# Patient Record
Sex: MALE | Race: WHITE | NOT HISPANIC OR LATINO | Employment: FULL TIME
[De-identification: names, ages, dates, MRNs, and addresses within clinical notes are randomized per-mention and may not be internally consistent; named-entity substitution may affect disease eponyms.]

---

## 2017-04-13 ENCOUNTER — RECORDS - HEALTHEAST (OUTPATIENT)
Dept: ADMINISTRATIVE | Facility: OTHER | Age: 52
End: 2017-04-13

## 2017-05-08 ENCOUNTER — COMMUNICATION - HEALTHEAST (OUTPATIENT)
Dept: FAMILY MEDICINE | Facility: CLINIC | Age: 52
End: 2017-05-08

## 2017-05-08 ENCOUNTER — OFFICE VISIT - HEALTHEAST (OUTPATIENT)
Dept: FAMILY MEDICINE | Facility: CLINIC | Age: 52
End: 2017-05-08

## 2017-05-08 DIAGNOSIS — R05.9 COUGH: ICD-10-CM

## 2017-05-08 DIAGNOSIS — I10 ESSENTIAL HYPERTENSION: ICD-10-CM

## 2017-05-08 DIAGNOSIS — E78.5 HYPERLIPIDEMIA: ICD-10-CM

## 2017-05-08 DIAGNOSIS — K21.9 GERD (GASTROESOPHAGEAL REFLUX DISEASE): ICD-10-CM

## 2017-05-08 DIAGNOSIS — Z00.00 HEALTHCARE MAINTENANCE: ICD-10-CM

## 2017-06-05 ENCOUNTER — OFFICE VISIT - HEALTHEAST (OUTPATIENT)
Dept: FAMILY MEDICINE | Facility: CLINIC | Age: 52
End: 2017-06-05

## 2017-06-05 ENCOUNTER — COMMUNICATION - HEALTHEAST (OUTPATIENT)
Dept: FAMILY MEDICINE | Facility: CLINIC | Age: 52
End: 2017-06-05

## 2017-06-05 DIAGNOSIS — Z91.09 ENVIRONMENTAL ALLERGIES: ICD-10-CM

## 2017-06-05 DIAGNOSIS — G47.00 INSOMNIA: ICD-10-CM

## 2017-06-05 DIAGNOSIS — M16.12 OSTEOARTHRITIS OF LEFT HIP: ICD-10-CM

## 2017-06-05 DIAGNOSIS — J30.2 SEASONAL ALLERGIES: ICD-10-CM

## 2017-06-05 DIAGNOSIS — D72.10 EOSINOPHILIA: ICD-10-CM

## 2017-06-05 DIAGNOSIS — R73.09 ELEVATED GLUCOSE: ICD-10-CM

## 2017-06-05 LAB — HBA1C MFR BLD: 5.6 % (ref 3.5–6)

## 2017-06-05 ASSESSMENT — MIFFLIN-ST. JEOR: SCORE: 1671.33

## 2017-06-21 ENCOUNTER — COMMUNICATION - HEALTHEAST (OUTPATIENT)
Dept: FAMILY MEDICINE | Facility: CLINIC | Age: 52
End: 2017-06-21

## 2017-06-22 ENCOUNTER — COMMUNICATION - HEALTHEAST (OUTPATIENT)
Dept: FAMILY MEDICINE | Facility: CLINIC | Age: 52
End: 2017-06-22

## 2017-06-22 DIAGNOSIS — E78.5 HYPERLIPIDEMIA: ICD-10-CM

## 2017-06-26 ENCOUNTER — RECORDS - HEALTHEAST (OUTPATIENT)
Dept: ADMINISTRATIVE | Facility: OTHER | Age: 52
End: 2017-06-26

## 2017-06-27 ENCOUNTER — RECORDS - HEALTHEAST (OUTPATIENT)
Dept: ADMINISTRATIVE | Facility: OTHER | Age: 52
End: 2017-06-27

## 2017-07-11 ENCOUNTER — COMMUNICATION - HEALTHEAST (OUTPATIENT)
Dept: FAMILY MEDICINE | Facility: CLINIC | Age: 52
End: 2017-07-11

## 2017-07-12 ENCOUNTER — OFFICE VISIT - HEALTHEAST (OUTPATIENT)
Dept: FAMILY MEDICINE | Facility: CLINIC | Age: 52
End: 2017-07-12

## 2017-07-12 DIAGNOSIS — Z01.818 PRE-OP EXAM: ICD-10-CM

## 2017-07-12 LAB
ATRIAL RATE - MUSE: 58 BPM
DIASTOLIC BLOOD PRESSURE - MUSE: NORMAL MMHG
INTERPRETATION ECG - MUSE: NORMAL
P AXIS - MUSE: -7 DEGREES
PR INTERVAL - MUSE: 170 MS
QRS DURATION - MUSE: 84 MS
QT - MUSE: 410 MS
QTC - MUSE: 402 MS
R AXIS - MUSE: 16 DEGREES
SYSTOLIC BLOOD PRESSURE - MUSE: NORMAL MMHG
T AXIS - MUSE: 33 DEGREES
VENTRICULAR RATE- MUSE: 58 BPM

## 2017-07-12 ASSESSMENT — MIFFLIN-ST. JEOR: SCORE: 1677.85

## 2017-08-03 ENCOUNTER — COMMUNICATION - HEALTHEAST (OUTPATIENT)
Dept: FAMILY MEDICINE | Facility: CLINIC | Age: 52
End: 2017-08-03

## 2017-08-22 ENCOUNTER — COMMUNICATION - HEALTHEAST (OUTPATIENT)
Dept: FAMILY MEDICINE | Facility: CLINIC | Age: 52
End: 2017-08-22

## 2017-08-22 DIAGNOSIS — L30.9 ECZEMA: ICD-10-CM

## 2017-09-20 ENCOUNTER — RECORDS - HEALTHEAST (OUTPATIENT)
Dept: ADMINISTRATIVE | Facility: OTHER | Age: 52
End: 2017-09-20

## 2017-10-12 ENCOUNTER — COMMUNICATION - HEALTHEAST (OUTPATIENT)
Dept: FAMILY MEDICINE | Facility: CLINIC | Age: 52
End: 2017-10-12

## 2017-10-12 ENCOUNTER — OFFICE VISIT - HEALTHEAST (OUTPATIENT)
Dept: FAMILY MEDICINE | Facility: CLINIC | Age: 52
End: 2017-10-12

## 2017-10-12 DIAGNOSIS — F41.1 ANXIETY STATE: ICD-10-CM

## 2017-10-12 DIAGNOSIS — D17.9 MULTIPLE LIPOMAS: ICD-10-CM

## 2017-10-12 DIAGNOSIS — R73.03 PREDIABETES: ICD-10-CM

## 2017-10-12 DIAGNOSIS — K13.0 ANGULAR CHEILITIS: ICD-10-CM

## 2017-10-12 DIAGNOSIS — K63.5 COLON POLYP: ICD-10-CM

## 2017-10-12 DIAGNOSIS — I10 ESSENTIAL HYPERTENSION: ICD-10-CM

## 2017-10-12 DIAGNOSIS — G47.00 INSOMNIA: ICD-10-CM

## 2017-10-12 DIAGNOSIS — R59.1 LYMPHADENOPATHY: ICD-10-CM

## 2017-10-12 DIAGNOSIS — N63.0 BREAST MASS: ICD-10-CM

## 2017-10-12 DIAGNOSIS — Z23 NEED FOR IMMUNIZATION AGAINST INFLUENZA: ICD-10-CM

## 2017-10-12 DIAGNOSIS — Z12.9 CANCER SCREENING: ICD-10-CM

## 2017-10-12 LAB
HBA1C MFR BLD: 5.2 % (ref 3.5–6)
PSA SERPL-MCNC: 1.8 NG/ML (ref 0–3.5)

## 2017-10-13 ENCOUNTER — COMMUNICATION - HEALTHEAST (OUTPATIENT)
Dept: FAMILY MEDICINE | Facility: CLINIC | Age: 52
End: 2017-10-13

## 2017-10-13 LAB
BASOPHILS # BLD AUTO: 0 THOU/UL (ref 0–0.2)
BASOPHILS NFR BLD AUTO: 1 % (ref 0–2)
EOSINOPHIL # BLD AUTO: 0.1 THOU/UL (ref 0–0.4)
EOSINOPHIL NFR BLD AUTO: 2 % (ref 0–6)
ERYTHROCYTE [DISTWIDTH] IN BLOOD BY AUTOMATED COUNT: 13.3 % (ref 11–14.5)
HCT VFR BLD AUTO: 43.2 % (ref 40–54)
HGB BLD-MCNC: 14.3 G/DL (ref 14–18)
LAB AP CHARGES (HE HISTORICAL CONVERSION): NORMAL
LYMPHOCYTES # BLD AUTO: 1.4 THOU/UL (ref 0.8–4.4)
LYMPHOCYTES NFR BLD AUTO: 25 % (ref 20–40)
MCH RBC QN AUTO: 29.2 PG (ref 27–34)
MCHC RBC AUTO-ENTMCNC: 33.1 G/DL (ref 32–36)
MCV RBC AUTO: 88 FL (ref 80–100)
MONOCYTES # BLD AUTO: 0.6 THOU/UL (ref 0–0.9)
MONOCYTES NFR BLD AUTO: 11 % (ref 2–10)
NEUTROPHILS # BLD AUTO: 3.2 THOU/UL (ref 2–7.7)
NEUTROPHILS NFR BLD AUTO: 61 % (ref 50–70)
PATH REPORT.COMMENTS IMP SPEC: NORMAL
PATH REPORT.COMMENTS IMP SPEC: NORMAL
PATH REPORT.FINAL DX SPEC: NORMAL
PATH REPORT.MICROSCOPIC SPEC OTHER STN: ABNORMAL
PATH REPORT.MICROSCOPIC SPEC OTHER STN: NORMAL
PATH REPORT.RELEVANT HX SPEC: NORMAL
PLATELET # BLD AUTO: 234 THOU/UL (ref 140–440)
PMV BLD AUTO: 10.5 FL (ref 8.5–12.5)
RBC # BLD AUTO: 4.89 MILL/UL (ref 4.4–6.2)
WBC: 5.4 THOU/UL (ref 4–11)

## 2017-10-15 ENCOUNTER — COMMUNICATION - HEALTHEAST (OUTPATIENT)
Dept: FAMILY MEDICINE | Facility: CLINIC | Age: 52
End: 2017-10-15

## 2017-10-17 ENCOUNTER — HOSPITAL ENCOUNTER (OUTPATIENT)
Dept: MAMMOGRAPHY | Facility: CLINIC | Age: 52
Discharge: HOME | End: 2017-10-17
Attending: FAMILY MEDICINE

## 2017-10-17 DIAGNOSIS — N63.0 BREAST MASS: ICD-10-CM

## 2017-10-17 DIAGNOSIS — R59.1 LYMPHADENOPATHY: ICD-10-CM

## 2017-10-18 ENCOUNTER — HOSPITAL ENCOUNTER (OUTPATIENT)
Dept: PET IMAGING | Facility: HOSPITAL | Age: 52
Discharge: HOME | End: 2017-10-18
Attending: FAMILY MEDICINE

## 2017-10-18 DIAGNOSIS — N63.0 BREAST MASS: ICD-10-CM

## 2017-10-18 DIAGNOSIS — R59.1 LYMPHADENOPATHY: ICD-10-CM

## 2017-10-18 ASSESSMENT — MIFFLIN-ST. JEOR: SCORE: 1662.26

## 2017-10-19 ENCOUNTER — COMMUNICATION - HEALTHEAST (OUTPATIENT)
Dept: FAMILY MEDICINE | Facility: CLINIC | Age: 52
End: 2017-10-19

## 2017-11-06 ENCOUNTER — COMMUNICATION - HEALTHEAST (OUTPATIENT)
Dept: FAMILY MEDICINE | Facility: CLINIC | Age: 52
End: 2017-11-06

## 2017-11-06 DIAGNOSIS — K21.9 GERD (GASTROESOPHAGEAL REFLUX DISEASE): ICD-10-CM

## 2017-12-26 ENCOUNTER — COMMUNICATION - HEALTHEAST (OUTPATIENT)
Dept: FAMILY MEDICINE | Facility: CLINIC | Age: 52
End: 2017-12-26

## 2018-01-01 ENCOUNTER — RECORDS - HEALTHEAST (OUTPATIENT)
Dept: ADMINISTRATIVE | Facility: OTHER | Age: 53
End: 2018-01-01

## 2018-01-03 ENCOUNTER — COMMUNICATION - HEALTHEAST (OUTPATIENT)
Dept: FAMILY MEDICINE | Facility: CLINIC | Age: 53
End: 2018-01-03

## 2018-01-03 DIAGNOSIS — R05.9 COUGH: ICD-10-CM

## 2018-03-22 ENCOUNTER — COMMUNICATION - HEALTHEAST (OUTPATIENT)
Dept: FAMILY MEDICINE | Facility: CLINIC | Age: 53
End: 2018-03-22

## 2018-03-22 DIAGNOSIS — I10 ESSENTIAL HYPERTENSION: ICD-10-CM

## 2018-03-23 ENCOUNTER — RECORDS - HEALTHEAST (OUTPATIENT)
Dept: ADMINISTRATIVE | Facility: OTHER | Age: 53
End: 2018-03-23

## 2018-04-09 ENCOUNTER — COMMUNICATION - HEALTHEAST (OUTPATIENT)
Dept: FAMILY MEDICINE | Facility: CLINIC | Age: 53
End: 2018-04-09

## 2018-04-09 DIAGNOSIS — E78.5 HYPERLIPIDEMIA: ICD-10-CM

## 2018-04-09 DIAGNOSIS — Z91.09 ENVIRONMENTAL ALLERGIES: ICD-10-CM

## 2018-04-10 ENCOUNTER — COMMUNICATION - HEALTHEAST (OUTPATIENT)
Dept: FAMILY MEDICINE | Facility: CLINIC | Age: 53
End: 2018-04-10

## 2018-04-10 DIAGNOSIS — Z91.09 ENVIRONMENTAL ALLERGIES: ICD-10-CM

## 2018-05-31 ENCOUNTER — COMMUNICATION - HEALTHEAST (OUTPATIENT)
Dept: FAMILY MEDICINE | Facility: CLINIC | Age: 53
End: 2018-05-31

## 2018-05-31 ENCOUNTER — OFFICE VISIT - HEALTHEAST (OUTPATIENT)
Dept: FAMILY MEDICINE | Facility: CLINIC | Age: 53
End: 2018-05-31

## 2018-05-31 DIAGNOSIS — R42 DIZZINESS: ICD-10-CM

## 2018-05-31 DIAGNOSIS — R09.81 SINUS CONGESTION: ICD-10-CM

## 2018-05-31 DIAGNOSIS — H57.9 EYE PRESSURE: ICD-10-CM

## 2018-05-31 DIAGNOSIS — G47.9 DIFFICULTY SLEEPING: ICD-10-CM

## 2018-05-31 DIAGNOSIS — E78.5 HYPERLIPIDEMIA: ICD-10-CM

## 2018-05-31 ASSESSMENT — MIFFLIN-ST. JEOR: SCORE: 1670.88

## 2018-06-25 ENCOUNTER — OFFICE VISIT - HEALTHEAST (OUTPATIENT)
Dept: OTOLARYNGOLOGY | Facility: CLINIC | Age: 53
End: 2018-06-25

## 2018-06-25 DIAGNOSIS — J34.2 NASAL SEPTAL DEFORMITY: ICD-10-CM

## 2018-06-26 ENCOUNTER — COMMUNICATION - HEALTHEAST (OUTPATIENT)
Dept: FAMILY MEDICINE | Facility: CLINIC | Age: 53
End: 2018-06-26

## 2018-06-26 DIAGNOSIS — I10 ESSENTIAL HYPERTENSION: ICD-10-CM

## 2018-06-28 ENCOUNTER — COMMUNICATION - HEALTHEAST (OUTPATIENT)
Dept: FAMILY MEDICINE | Facility: CLINIC | Age: 53
End: 2018-06-28

## 2018-06-28 DIAGNOSIS — M89.9 DISORDER OF BONE AND CARTILAGE: ICD-10-CM

## 2018-06-28 DIAGNOSIS — M94.9 DISORDER OF BONE AND CARTILAGE: ICD-10-CM

## 2018-07-18 ENCOUNTER — RECORDS - HEALTHEAST (OUTPATIENT)
Dept: ADMINISTRATIVE | Facility: OTHER | Age: 53
End: 2018-07-18

## 2018-07-18 ENCOUNTER — RECORDS - HEALTHEAST (OUTPATIENT)
Dept: BONE DENSITY | Facility: CLINIC | Age: 53
End: 2018-07-18

## 2018-07-18 DIAGNOSIS — M94.9 DISORDER OF CARTILAGE, UNSPECIFIED: ICD-10-CM

## 2018-07-18 DIAGNOSIS — M89.9 DISORDER OF BONE, UNSPECIFIED: ICD-10-CM

## 2018-07-20 ENCOUNTER — COMMUNICATION - HEALTHEAST (OUTPATIENT)
Dept: FAMILY MEDICINE | Facility: CLINIC | Age: 53
End: 2018-07-20

## 2018-09-17 ENCOUNTER — RECORDS - HEALTHEAST (OUTPATIENT)
Dept: ADMINISTRATIVE | Facility: OTHER | Age: 53
End: 2018-09-17

## 2018-12-14 ENCOUNTER — COMMUNICATION - HEALTHEAST (OUTPATIENT)
Dept: FAMILY MEDICINE | Facility: CLINIC | Age: 53
End: 2018-12-14

## 2019-02-05 ENCOUNTER — RECORDS - HEALTHEAST (OUTPATIENT)
Dept: ADMINISTRATIVE | Facility: OTHER | Age: 54
End: 2019-02-05

## 2019-02-08 ENCOUNTER — COMMUNICATION - HEALTHEAST (OUTPATIENT)
Dept: FAMILY MEDICINE | Facility: CLINIC | Age: 54
End: 2019-02-08

## 2019-02-16 ENCOUNTER — COMMUNICATION - HEALTHEAST (OUTPATIENT)
Dept: FAMILY MEDICINE | Facility: CLINIC | Age: 54
End: 2019-02-16

## 2019-03-01 ENCOUNTER — RECORDS - HEALTHEAST (OUTPATIENT)
Dept: ADMINISTRATIVE | Facility: OTHER | Age: 54
End: 2019-03-01

## 2019-03-19 ENCOUNTER — RECORDS - HEALTHEAST (OUTPATIENT)
Dept: ADMINISTRATIVE | Facility: OTHER | Age: 54
End: 2019-03-19

## 2019-03-24 ENCOUNTER — COMMUNICATION - HEALTHEAST (OUTPATIENT)
Dept: FAMILY MEDICINE | Facility: CLINIC | Age: 54
End: 2019-03-24

## 2019-03-24 DIAGNOSIS — K21.9 GERD (GASTROESOPHAGEAL REFLUX DISEASE): ICD-10-CM

## 2019-04-16 ENCOUNTER — OFFICE VISIT - HEALTHEAST (OUTPATIENT)
Dept: FAMILY MEDICINE | Facility: CLINIC | Age: 54
End: 2019-04-16

## 2019-04-16 DIAGNOSIS — M89.9 DISORDER OF BONE AND CARTILAGE: ICD-10-CM

## 2019-04-16 DIAGNOSIS — G25.81 RESTLESS LEGS SYNDROME (RLS): ICD-10-CM

## 2019-04-16 DIAGNOSIS — M71.30 SYNOVIAL CYST: ICD-10-CM

## 2019-04-16 DIAGNOSIS — G47.00 INSOMNIA, UNSPECIFIED TYPE: ICD-10-CM

## 2019-04-16 DIAGNOSIS — I10 ESSENTIAL HYPERTENSION: ICD-10-CM

## 2019-04-16 DIAGNOSIS — L20.89 PAPULAR ATOPIC DERMATITIS: ICD-10-CM

## 2019-04-16 DIAGNOSIS — E78.5 HYPERLIPIDEMIA: ICD-10-CM

## 2019-04-16 DIAGNOSIS — L71.9 ROSACEA: ICD-10-CM

## 2019-04-16 DIAGNOSIS — M94.9 DISORDER OF BONE AND CARTILAGE: ICD-10-CM

## 2019-04-16 DIAGNOSIS — R51.9 PRESSURE IN HEAD: ICD-10-CM

## 2019-04-16 DIAGNOSIS — R06.83 SNORING: ICD-10-CM

## 2019-04-17 ENCOUNTER — OFFICE VISIT - HEALTHEAST (OUTPATIENT)
Dept: SLEEP MEDICINE | Facility: CLINIC | Age: 54
End: 2019-04-17

## 2019-04-17 DIAGNOSIS — G47.26 SHIFT WORK SLEEP DISORDER: ICD-10-CM

## 2019-04-17 DIAGNOSIS — R53.83 FATIGUE, UNSPECIFIED TYPE: ICD-10-CM

## 2019-04-17 DIAGNOSIS — G25.81 RESTLESS LEGS SYNDROME (RLS): ICD-10-CM

## 2019-04-17 DIAGNOSIS — R06.83 SNORING: ICD-10-CM

## 2019-04-17 DIAGNOSIS — G47.00 INSOMNIA, UNSPECIFIED TYPE: ICD-10-CM

## 2019-04-17 ASSESSMENT — MIFFLIN-ST. JEOR: SCORE: 1694.01

## 2019-04-19 ENCOUNTER — COMMUNICATION - HEALTHEAST (OUTPATIENT)
Dept: FAMILY MEDICINE | Facility: CLINIC | Age: 54
End: 2019-04-19

## 2019-05-08 ENCOUNTER — RECORDS - HEALTHEAST (OUTPATIENT)
Dept: ADMINISTRATIVE | Facility: OTHER | Age: 54
End: 2019-05-08

## 2019-05-08 ENCOUNTER — RECORDS - HEALTHEAST (OUTPATIENT)
Dept: SLEEP MEDICINE | Facility: CLINIC | Age: 54
End: 2019-05-08

## 2019-05-08 DIAGNOSIS — R06.83 SNORING: ICD-10-CM

## 2019-05-08 DIAGNOSIS — R53.83 OTHER FATIGUE: ICD-10-CM

## 2019-05-08 DIAGNOSIS — G25.81 RESTLESS LEGS SYNDROME: ICD-10-CM

## 2019-05-30 ENCOUNTER — OFFICE VISIT - HEALTHEAST (OUTPATIENT)
Dept: SLEEP MEDICINE | Facility: CLINIC | Age: 54
End: 2019-05-30

## 2019-05-30 DIAGNOSIS — R06.83 SNORING: ICD-10-CM

## 2019-05-30 DIAGNOSIS — G25.81 RESTLESS LEGS SYNDROME (RLS): ICD-10-CM

## 2019-05-30 ASSESSMENT — MIFFLIN-ST. JEOR: SCORE: 1677.68

## 2019-09-07 ENCOUNTER — COMMUNICATION - HEALTHEAST (OUTPATIENT)
Dept: FAMILY MEDICINE | Facility: CLINIC | Age: 54
End: 2019-09-07

## 2019-09-07 DIAGNOSIS — M94.9 DISORDER OF BONE AND CARTILAGE: ICD-10-CM

## 2019-09-07 DIAGNOSIS — M89.9 DISORDER OF BONE AND CARTILAGE: ICD-10-CM

## 2019-09-10 ENCOUNTER — COMMUNICATION - HEALTHEAST (OUTPATIENT)
Dept: FAMILY MEDICINE | Facility: CLINIC | Age: 54
End: 2019-09-10

## 2019-09-10 DIAGNOSIS — M94.9 DISORDER OF BONE AND CARTILAGE: ICD-10-CM

## 2019-09-10 DIAGNOSIS — J30.2 SEASONAL ALLERGIES: ICD-10-CM

## 2019-09-10 DIAGNOSIS — L71.9 ROSACEA: ICD-10-CM

## 2019-09-10 DIAGNOSIS — M89.9 DISORDER OF BONE AND CARTILAGE: ICD-10-CM

## 2019-10-02 ENCOUNTER — RECORDS - HEALTHEAST (OUTPATIENT)
Dept: ADMINISTRATIVE | Facility: OTHER | Age: 54
End: 2019-10-02

## 2019-10-02 ENCOUNTER — RECORDS - HEALTHEAST (OUTPATIENT)
Dept: BONE DENSITY | Facility: CLINIC | Age: 54
End: 2019-10-02

## 2019-10-02 DIAGNOSIS — M89.9 DISORDER OF BONE, UNSPECIFIED: ICD-10-CM

## 2019-10-02 DIAGNOSIS — M94.9 DISORDER OF CARTILAGE, UNSPECIFIED: ICD-10-CM

## 2019-10-30 ENCOUNTER — AMBULATORY - HEALTHEAST (OUTPATIENT)
Dept: NURSING | Facility: CLINIC | Age: 54
End: 2019-10-30

## 2019-12-19 ENCOUNTER — COMMUNICATION - HEALTHEAST (OUTPATIENT)
Dept: FAMILY MEDICINE | Facility: CLINIC | Age: 54
End: 2019-12-19

## 2019-12-19 DIAGNOSIS — M94.9 DISORDER OF BONE AND CARTILAGE: ICD-10-CM

## 2019-12-19 DIAGNOSIS — M89.9 DISORDER OF BONE AND CARTILAGE: ICD-10-CM

## 2019-12-19 DIAGNOSIS — K21.9 GERD (GASTROESOPHAGEAL REFLUX DISEASE): ICD-10-CM

## 2019-12-19 DIAGNOSIS — I10 ESSENTIAL HYPERTENSION: ICD-10-CM

## 2020-04-04 ENCOUNTER — COMMUNICATION - HEALTHEAST (OUTPATIENT)
Dept: FAMILY MEDICINE | Facility: CLINIC | Age: 55
End: 2020-04-04

## 2020-04-04 DIAGNOSIS — M94.9 DISORDER OF BONE AND CARTILAGE: ICD-10-CM

## 2020-04-04 DIAGNOSIS — M89.9 DISORDER OF BONE AND CARTILAGE: ICD-10-CM

## 2020-05-14 ENCOUNTER — COMMUNICATION - HEALTHEAST (OUTPATIENT)
Dept: FAMILY MEDICINE | Facility: CLINIC | Age: 55
End: 2020-05-14

## 2020-05-14 DIAGNOSIS — E78.5 HYPERLIPIDEMIA: ICD-10-CM

## 2020-06-10 ENCOUNTER — COMMUNICATION - HEALTHEAST (OUTPATIENT)
Dept: FAMILY MEDICINE | Facility: CLINIC | Age: 55
End: 2020-06-10

## 2020-06-15 ENCOUNTER — OFFICE VISIT - HEALTHEAST (OUTPATIENT)
Dept: FAMILY MEDICINE | Facility: CLINIC | Age: 55
End: 2020-06-15

## 2020-06-15 DIAGNOSIS — E78.5 HYPERLIPIDEMIA: ICD-10-CM

## 2020-06-15 DIAGNOSIS — L71.9 ROSACEA: ICD-10-CM

## 2020-06-15 DIAGNOSIS — M94.9 DISORDER OF BONE AND CARTILAGE: ICD-10-CM

## 2020-06-15 DIAGNOSIS — M89.9 DISORDER OF BONE AND CARTILAGE: ICD-10-CM

## 2020-06-15 DIAGNOSIS — G25.81 RESTLESS LEGS SYNDROME (RLS): ICD-10-CM

## 2020-06-15 DIAGNOSIS — G47.00 INSOMNIA, UNSPECIFIED TYPE: ICD-10-CM

## 2020-06-15 DIAGNOSIS — I10 ESSENTIAL HYPERTENSION: ICD-10-CM

## 2020-06-15 DIAGNOSIS — M16.9 OSTEOARTHRITIS OF HIP, UNSPECIFIED LATERALITY, UNSPECIFIED OSTEOARTHRITIS TYPE: ICD-10-CM

## 2020-06-15 DIAGNOSIS — R51.9 PRESSURE IN HEAD: ICD-10-CM

## 2020-06-15 DIAGNOSIS — R06.83 SNORING: ICD-10-CM

## 2020-06-15 RX ORDER — ATENOLOL 25 MG/1
25 TABLET ORAL DAILY
Qty: 90 TABLET | Refills: 4 | Status: SHIPPED | OUTPATIENT
Start: 2020-06-15 | End: 2021-09-03

## 2020-06-15 RX ORDER — ALENDRONATE SODIUM 70 MG/1
TABLET ORAL
Qty: 12 TABLET | Refills: 4 | Status: SHIPPED | OUTPATIENT
Start: 2020-06-15 | End: 2021-08-05

## 2020-06-26 ENCOUNTER — RECORDS - HEALTHEAST (OUTPATIENT)
Dept: ADMINISTRATIVE | Facility: OTHER | Age: 55
End: 2020-06-26

## 2020-08-13 ENCOUNTER — COMMUNICATION - HEALTHEAST (OUTPATIENT)
Dept: FAMILY MEDICINE | Facility: CLINIC | Age: 55
End: 2020-08-13

## 2020-08-13 DIAGNOSIS — G47.00 INSOMNIA, UNSPECIFIED TYPE: ICD-10-CM

## 2020-10-21 ENCOUNTER — COMMUNICATION - HEALTHEAST (OUTPATIENT)
Dept: FAMILY MEDICINE | Facility: CLINIC | Age: 55
End: 2020-10-21

## 2020-10-21 DIAGNOSIS — N50.89 MASS OF LEFT TESTICLE: ICD-10-CM

## 2020-10-27 ENCOUNTER — HOSPITAL ENCOUNTER (OUTPATIENT)
Dept: ULTRASOUND IMAGING | Facility: HOSPITAL | Age: 55
Discharge: HOME | End: 2020-10-27
Attending: FAMILY MEDICINE

## 2020-10-27 DIAGNOSIS — N50.89 MASS OF LEFT TESTICLE: ICD-10-CM

## 2020-10-28 ENCOUNTER — COMMUNICATION - HEALTHEAST (OUTPATIENT)
Dept: FAMILY MEDICINE | Facility: CLINIC | Age: 55
End: 2020-10-28

## 2020-10-28 DIAGNOSIS — K40.90 LEFT INGUINAL HERNIA: ICD-10-CM

## 2020-11-24 ENCOUNTER — COMMUNICATION - HEALTHEAST (OUTPATIENT)
Dept: FAMILY MEDICINE | Facility: CLINIC | Age: 55
End: 2020-11-24

## 2020-11-24 ENCOUNTER — OFFICE VISIT - HEALTHEAST (OUTPATIENT)
Dept: FAMILY MEDICINE | Facility: CLINIC | Age: 55
End: 2020-11-24

## 2020-11-24 DIAGNOSIS — J01.90 ACUTE SINUSITIS WITH SYMPTOMS GREATER THAN 10 DAYS: ICD-10-CM

## 2020-11-27 ENCOUNTER — COMMUNICATION - HEALTHEAST (OUTPATIENT)
Dept: FAMILY MEDICINE | Facility: CLINIC | Age: 55
End: 2020-11-27

## 2020-11-27 DIAGNOSIS — K21.9 GERD (GASTROESOPHAGEAL REFLUX DISEASE): ICD-10-CM

## 2020-11-27 DIAGNOSIS — G47.00 INSOMNIA, UNSPECIFIED TYPE: ICD-10-CM

## 2021-01-15 ENCOUNTER — COMMUNICATION - HEALTHEAST (OUTPATIENT)
Dept: FAMILY MEDICINE | Facility: CLINIC | Age: 56
End: 2021-01-15

## 2021-02-27 ENCOUNTER — COMMUNICATION - HEALTHEAST (OUTPATIENT)
Dept: FAMILY MEDICINE | Facility: CLINIC | Age: 56
End: 2021-02-27

## 2021-02-27 DIAGNOSIS — G47.00 INSOMNIA, UNSPECIFIED TYPE: ICD-10-CM

## 2021-03-08 ENCOUNTER — RECORDS - HEALTHEAST (OUTPATIENT)
Dept: ADMINISTRATIVE | Facility: OTHER | Age: 56
End: 2021-03-08

## 2021-03-08 LAB
ALT SERPL W/O P-5'-P-CCNC: 18 U/L (ref 9–46)
AST SERPL-CCNC: 16 U/L (ref 10–35)
CHOLEST SERPL-MCNC: 141 MG/DL
CREAT SERPL-MCNC: 0.94 MG/DL (ref 0.7–1.33)
GFR ESTIMATE EXT - HISTORICAL: 91 ML/MIN/1.73M2
GFR ESTIMATE, IF BLACK EXT - HISTORICAL: 105 ML/MIN/1.73M2
HDLC SERPL-MCNC: 52 MG/DL
LDLC SERPL CALC-MCNC: 73 MG/DL
NON HDL CHOL. (LDL+VLDL): 89 MG/DL
TRIGLYCERIDES (HISTORICAL CONVERSION): 84 MG/DL

## 2021-03-09 ENCOUNTER — RECORDS - HEALTHEAST (OUTPATIENT)
Dept: ADMINISTRATIVE | Facility: OTHER | Age: 56
End: 2021-03-09

## 2021-03-17 ENCOUNTER — COMMUNICATION - HEALTHEAST (OUTPATIENT)
Dept: FAMILY MEDICINE | Facility: CLINIC | Age: 56
End: 2021-03-17

## 2021-03-18 ENCOUNTER — COMMUNICATION - HEALTHEAST (OUTPATIENT)
Dept: FAMILY MEDICINE | Facility: CLINIC | Age: 56
End: 2021-03-18

## 2021-03-19 ENCOUNTER — OFFICE VISIT - HEALTHEAST (OUTPATIENT)
Dept: FAMILY MEDICINE | Facility: CLINIC | Age: 56
End: 2021-03-19

## 2021-03-19 DIAGNOSIS — I10 ESSENTIAL HYPERTENSION: ICD-10-CM

## 2021-03-19 DIAGNOSIS — Z09 HOSPITAL DISCHARGE FOLLOW-UP: ICD-10-CM

## 2021-03-19 DIAGNOSIS — R07.89 ATYPICAL CHEST PAIN: ICD-10-CM

## 2021-03-19 DIAGNOSIS — G47.00 INSOMNIA, UNSPECIFIED TYPE: ICD-10-CM

## 2021-03-19 DIAGNOSIS — F41.1 ANXIETY STATE: ICD-10-CM

## 2021-03-19 RX ORDER — PROPRANOLOL HYDROCHLORIDE 10 MG/1
TABLET ORAL
Qty: 30 TABLET | Refills: 1 | Status: SHIPPED | OUTPATIENT
Start: 2021-03-19 | End: 2021-07-09

## 2021-03-19 ASSESSMENT — MIFFLIN-ST. JEOR: SCORE: 1631.76

## 2021-03-22 ENCOUNTER — HOSPITAL ENCOUNTER (OUTPATIENT)
Dept: NUCLEAR MEDICINE | Facility: HOSPITAL | Age: 56
Discharge: HOME | End: 2021-03-22
Attending: FAMILY MEDICINE

## 2021-03-22 ENCOUNTER — COMMUNICATION - HEALTHEAST (OUTPATIENT)
Dept: FAMILY MEDICINE | Facility: CLINIC | Age: 56
End: 2021-03-22

## 2021-03-22 ENCOUNTER — HOSPITAL ENCOUNTER (OUTPATIENT)
Dept: CARDIOLOGY | Facility: HOSPITAL | Age: 56
Discharge: HOME | End: 2021-03-22
Attending: FAMILY MEDICINE

## 2021-03-22 DIAGNOSIS — R07.89 ATYPICAL CHEST PAIN: ICD-10-CM

## 2021-03-22 DIAGNOSIS — F41.0 PANIC ATTACK: ICD-10-CM

## 2021-03-22 DIAGNOSIS — F41.1 ANXIETY STATE: ICD-10-CM

## 2021-03-22 LAB
CV STRESS CURRENT BP HE: NORMAL
CV STRESS CURRENT HR HE: 102
CV STRESS CURRENT HR HE: 112
CV STRESS CURRENT HR HE: 117
CV STRESS CURRENT HR HE: 120
CV STRESS CURRENT HR HE: 122
CV STRESS CURRENT HR HE: 126
CV STRESS CURRENT HR HE: 131
CV STRESS CURRENT HR HE: 131
CV STRESS CURRENT HR HE: 137
CV STRESS CURRENT HR HE: 138
CV STRESS CURRENT HR HE: 141
CV STRESS CURRENT HR HE: 153
CV STRESS CURRENT HR HE: 153
CV STRESS CURRENT HR HE: 162
CV STRESS CURRENT HR HE: 165
CV STRESS CURRENT HR HE: 166
CV STRESS CURRENT HR HE: 167
CV STRESS CURRENT HR HE: 167
CV STRESS CURRENT HR HE: 81
CV STRESS CURRENT HR HE: 86
CV STRESS CURRENT HR HE: 87
CV STRESS CURRENT HR HE: 91
CV STRESS CURRENT HR HE: 92
CV STRESS CURRENT HR HE: 92
CV STRESS CURRENT HR HE: 94
CV STRESS CURRENT HR HE: 99
CV STRESS DEVIATION TIME HE: NORMAL
CV STRESS ECHO PERCENT HR HE: NORMAL
CV STRESS EXERCISE STAGE HE: NORMAL
CV STRESS EXERCISE STAGE REACHED HE: NORMAL
CV STRESS FINAL RESTING BP HE: NORMAL
CV STRESS FINAL RESTING HR HE: 86
CV STRESS MAX HR HE: 168
CV STRESS MAX TREADMILL GRADE HE: 18
CV STRESS MAX TREADMILL SPEED HE: 5
CV STRESS PEAK DIA BP HE: NORMAL
CV STRESS PEAK SYS BP HE: NORMAL
CV STRESS PHASE HE: NORMAL
CV STRESS PROTOCOL HE: NORMAL
CV STRESS RESTING PT POSITION HE: NORMAL
CV STRESS RESTING PT POSITION HE: NORMAL
CV STRESS ST DEVIATION AMOUNT HE: NORMAL
CV STRESS ST DEVIATION ELEVATION HE: NORMAL
CV STRESS ST EVELATION AMOUNT HE: NORMAL
CV STRESS TEST TYPE HE: NORMAL
CV STRESS TOTAL STAGE TIME MIN 1 HE: NORMAL
NUC STRESS EJECTION FRACTION: 65 %
RATE PRESSURE PRODUCT: NORMAL
STRESS ANGINA INDEX: 0
STRESS ECHO BASELINE DIASTOLIC HE: 82
STRESS ECHO BASELINE HR: 85
STRESS ECHO BASELINE SYSTOLIC BP: 122
STRESS ECHO CALCULATED PERCENT HR: 102 %
STRESS ECHO LAST STRESS DIASTOLIC BP: 78
STRESS ECHO LAST STRESS HR: 166
STRESS ECHO LAST STRESS SYSTOLIC BP: 186
STRESS ECHO POST ESTIMATED WORKLOAD: 12.3
STRESS ECHO POST EXERCISE DUR MIN: 12
STRESS ECHO POST EXERCISE DUR SEC: 0
STRESS ECHO TARGET HR: 165

## 2021-03-25 ASSESSMENT — ANXIETY QUESTIONNAIRES
1. FEELING NERVOUS, ANXIOUS, OR ON EDGE: NEARLY EVERY DAY
5. BEING SO RESTLESS THAT IT IS HARD TO SIT STILL: MORE THAN HALF THE DAYS
7. FEELING AFRAID AS IF SOMETHING AWFUL MIGHT HAPPEN: SEVERAL DAYS
6. BECOMING EASILY ANNOYED OR IRRITABLE: NOT AT ALL
2. NOT BEING ABLE TO STOP OR CONTROL WORRYING: NEARLY EVERY DAY
GAD7 TOTAL SCORE: 15
3. WORRYING TOO MUCH ABOUT DIFFERENT THINGS: NEARLY EVERY DAY
4. TROUBLE RELAXING: NEARLY EVERY DAY

## 2021-03-31 ENCOUNTER — COMMUNICATION - HEALTHEAST (OUTPATIENT)
Dept: FAMILY MEDICINE | Facility: CLINIC | Age: 56
End: 2021-03-31

## 2021-03-31 DIAGNOSIS — G47.00 INSOMNIA, UNSPECIFIED TYPE: ICD-10-CM

## 2021-04-06 ENCOUNTER — RECORDS - HEALTHEAST (OUTPATIENT)
Dept: HEALTH INFORMATION MANAGEMENT | Facility: CLINIC | Age: 56
End: 2021-04-06

## 2021-04-09 ENCOUNTER — RECORDS - HEALTHEAST (OUTPATIENT)
Dept: ADMINISTRATIVE | Facility: OTHER | Age: 56
End: 2021-04-09

## 2021-04-30 ENCOUNTER — OFFICE VISIT - HEALTHEAST (OUTPATIENT)
Dept: FAMILY MEDICINE | Facility: CLINIC | Age: 56
End: 2021-04-30

## 2021-04-30 ENCOUNTER — RECORDS - HEALTHEAST (OUTPATIENT)
Dept: FAMILY MEDICINE | Facility: CLINIC | Age: 56
End: 2021-04-30

## 2021-04-30 DIAGNOSIS — F41.0 PANIC ATTACK: ICD-10-CM

## 2021-04-30 DIAGNOSIS — F41.1 ANXIETY STATE: ICD-10-CM

## 2021-04-30 DIAGNOSIS — L30.9 ECZEMA: ICD-10-CM

## 2021-04-30 DIAGNOSIS — K21.9 GERD (GASTROESOPHAGEAL REFLUX DISEASE): ICD-10-CM

## 2021-04-30 DIAGNOSIS — Z13.220 LIPID SCREENING: ICD-10-CM

## 2021-04-30 DIAGNOSIS — M94.9 DISORDER OF BONE AND CARTILAGE: ICD-10-CM

## 2021-04-30 DIAGNOSIS — I10 ESSENTIAL HYPERTENSION: ICD-10-CM

## 2021-04-30 DIAGNOSIS — M89.9 DISORDER OF BONE AND CARTILAGE: ICD-10-CM

## 2021-04-30 DIAGNOSIS — Z91.09 ENVIRONMENTAL ALLERGIES: ICD-10-CM

## 2021-04-30 RX ORDER — DESONIDE 0.5 MG/G
CREAM TOPICAL
Qty: 60 G | Refills: 3 | Status: SHIPPED | OUTPATIENT
Start: 2021-04-30 | End: 2022-01-02

## 2021-04-30 RX ORDER — ALPRAZOLAM 0.25 MG
0.25 TABLET ORAL DAILY PRN
Qty: 30 TABLET | Refills: 1 | Status: SHIPPED | OUTPATIENT
Start: 2021-04-30 | End: 2022-01-02

## 2021-04-30 ASSESSMENT — PATIENT HEALTH QUESTIONNAIRE - PHQ9: SUM OF ALL RESPONSES TO PHQ QUESTIONS 1-9: 4

## 2021-04-30 ASSESSMENT — ANXIETY QUESTIONNAIRES
GAD7 TOTAL SCORE: 10
5. BEING SO RESTLESS THAT IT IS HARD TO SIT STILL: SEVERAL DAYS
IF YOU CHECKED OFF ANY PROBLEMS ON THIS QUESTIONNAIRE, HOW DIFFICULT HAVE THESE PROBLEMS MADE IT FOR YOU TO DO YOUR WORK, TAKE CARE OF THINGS AT HOME, OR GET ALONG WITH OTHER PEOPLE: SOMEWHAT DIFFICULT
1. FEELING NERVOUS, ANXIOUS, OR ON EDGE: NEARLY EVERY DAY
3. WORRYING TOO MUCH ABOUT DIFFERENT THINGS: MORE THAN HALF THE DAYS
2. NOT BEING ABLE TO STOP OR CONTROL WORRYING: MORE THAN HALF THE DAYS
4. TROUBLE RELAXING: SEVERAL DAYS
7. FEELING AFRAID AS IF SOMETHING AWFUL MIGHT HAPPEN: SEVERAL DAYS
6. BECOMING EASILY ANNOYED OR IRRITABLE: NOT AT ALL

## 2021-05-12 ENCOUNTER — RECORDS - HEALTHEAST (OUTPATIENT)
Dept: ADMINISTRATIVE | Facility: OTHER | Age: 56
End: 2021-05-12

## 2021-05-12 DIAGNOSIS — F41.1 ANXIETY STATE: ICD-10-CM

## 2021-05-12 DIAGNOSIS — G47.00 INSOMNIA, UNSPECIFIED TYPE: ICD-10-CM

## 2021-05-12 RX ORDER — PAROXETINE 20 MG/1
20 TABLET, FILM COATED ORAL DAILY
Qty: 90 TABLET | Refills: 3 | Status: SHIPPED | OUTPATIENT
Start: 2021-05-12 | End: 2022-01-02

## 2021-05-12 RX ORDER — ZOLPIDEM TARTRATE 10 MG/1
10 TABLET ORAL
Qty: 30 TABLET | Refills: 4 | Status: SHIPPED | OUTPATIENT
Start: 2021-05-12 | End: 2021-11-09

## 2021-05-14 ENCOUNTER — COMMUNICATION - HEALTHEAST (OUTPATIENT)
Dept: FAMILY MEDICINE | Facility: CLINIC | Age: 56
End: 2021-05-14

## 2021-05-14 DIAGNOSIS — E78.5 HYPERLIPIDEMIA: ICD-10-CM

## 2021-05-14 RX ORDER — SIMVASTATIN 20 MG
TABLET ORAL
Qty: 90 TABLET | Refills: 4 | Status: SHIPPED | OUTPATIENT
Start: 2021-05-14 | End: 2021-09-03

## 2021-05-27 ENCOUNTER — RECORDS - HEALTHEAST (OUTPATIENT)
Dept: ADMINISTRATIVE | Facility: CLINIC | Age: 56
End: 2021-05-27

## 2021-05-27 ASSESSMENT — PATIENT HEALTH QUESTIONNAIRE - PHQ9: SUM OF ALL RESPONSES TO PHQ QUESTIONS 1-9: 4

## 2021-05-27 NOTE — PATIENT INSTRUCTIONS - HE
Your bed should be for sleep and sex only, so work on keeping it that way.    Cut back on your time in bed. Pick 8 hours and set an alarm. By consolidating your time in bed, your brain will work to consolidate your sleep.    Keep the same schedule every day.

## 2021-05-27 NOTE — PROGRESS NOTES
ASSESSMENT and PLAN:  1. Rosacea  -Patient was using desonide for rosacea which typically is not first line recommendation.  We will switch him to MetroGel and okay to switch him to 0.75 twice daily if 1% is not covered by insurance  - metroNIDAZOLE (METROGEL) 1 % gel; Apply gel to face once daily as directed for rosacea  Dispense: 60 g; Refill: 0    2. Synovial cyst  -Synovial cyst present along first MTP on the right foot.  Discussed with patient referral to podiatry or or so for removal.  We could consider drainage here in clinic as well with aspiration and injection of cortisone.  It is not yet bothering him.  Avoid shoes that are tight fitting that could cause increased friction in the meantime.  He does have lipomas all throughout his body so potentially this could be a lipoma as well    3. Pressure in head  -Patient has been dealing with a lot of head pressure that he is concerned about.  He has treated for allergies and has used nasal steroid spray which may be helps a little.  He is mostly concerned about something intracranial.  We discussed his likely sleep apnea that I think is the underlying issue here.  He is excepting a sleep referral and will pursue that but in the meantime he would like to obtain an MRI for peace of mind  - MR Brain With Without Contrast; Future  - MR Brain With Without Contrast    4. Hypertension  -Blood pressure stable on the atenolol since switching back from metoprolol.  He has no adverse side effects to this medication.  He asked if there is any reason that he should not be on this medication and although it is not first line it is working well for him    5. Osteopenia  -Patient continues on alendronate and we will reevaluate with a bone density scan later in the year and if any worsening findings I will refer him for osteoporosis evaluation and consideration of changing to Prolia    6. Insomnia, unspecified type  -Patient has been using trazodone on nights that he is not  working and is helpful.  We discussed that trazodone is not first line for insomnia but he finds that it is helpful and has no adverse effects.  He is willing to pursue sleep referral to help with his shift work disorder and possibly restless leg and snoring.  Potentially the restless leg and sleep onset could be tackled by changing him to gabapentin.  Will hold off now at this time  - Ambulatory referral to Sleep Medicine  - traZODone (DESYREL) 50 MG tablet; Take 1 tablet (50 mg total) by mouth at bedtime.  Dispense: 90 tablet; Refill: 3    7. Papular atopic dermatitis vs prurigo nodularis   -Patient has multiple lesions that are developing along his forearms.  He is going to keep track so we can understand that these are fomite bites however no other family members are experiencing these.  We are going to treat with topical steroid cream and if no improvement in the next couple weeks I would like to biopsy at least the one on his right forearm with a punch biopsy to confirm diagnosis and ensure no precancerous lesion  - clobetasol (TEMOVATE) 0.05 % cream; Apply to affected lesions on arms once to twice daily as directed  Dispense: 30 g; Refill: 0    8. Restless legs syndrome (RLS)  -See above  - Ambulatory referral to Sleep Medicine    9. Snoring  -See above.  - Ambulatory referral to Sleep Medicine    10. Hyperlipidemia  -Stable.  Labs recently completed at work in our normal range  - simvastatin (ZOCOR) 20 MG tablet; Take 1 tablet (20 mg total) by mouth at bedtime.  Dispense: 90 tablet; Refill: 3      Health maintenance patient brought in lab work and EKG which was done at his job.  Routine health maintenance including evaluation for underlying respiratory disease given his occupation.  All within normal limits.  Blood glucose fasting mildly elevated which has been but he always maintains normal A1c.  We will continue to follow.  All medications refilled but needed to be      Patient Instructions   Brennan was  "seen today for medication management, rash and bunions.    Diagnoses and all orders for this visit:    Rosacea - going to switch you to metrogel apply once daily and let me know if too expenxive with insurance and Ill switch to 0.75 twice daily     Synovial cyst- let me know when bothersome and will send you to podiatry/ortho to aspirate and steroid or to remove (if lipoma). We could \"try\" to aspirate here    Pressure in head- order in for head imaging.  Pursue sleep study     Hypertension- atenolol .     Osteopenia- repeat dxa scan end of the year (after July)     Insomnia, unspecified type- order for sleep study  . Can consider change from trazodone in future if need to      Papular atopic dermatitis- prescribe topical steroid .  If $30 or more let me know and Ill change  It. Apply it once to twice daily to lesions no more than 2 weeks and if persists then will schedule biopsy.       I will place refills on all your medications and store at the pharmacy        Orders Placed This Encounter   Procedures     MR Brain With Without Contrast     Standing Status:   Future     Number of Occurrences:   1     Standing Expiration Date:   4/16/2020     Order Specific Question:   Reason for Exam (Describe Symptoms):     Answer:   intermittent head pressure- eval for aneurysm/ other pathology. chemical exposure at job     Order Specific Question:   Can the procedure be changed per Radiologist protocol?     Answer:   Yes     Order Specific Question:   If this is a diagnostic procedure, have the patient's age and recent imaging history been considered?     Answer:   Yes     Order Specific Question:   Is the patient claustrophobic and in need of sedation to complete their MR scan?     Answer:   No     Tdap vaccine,  6yo or older,  IM     Ambulatory referral to Sleep Medicine     Referral Priority:   Routine     Referral Type:   Consultation     Referral Reason:   Evaluation and Treatment     Requested Specialty:   Sleep Medicine "     Number of Visits Requested:   1     Medications Discontinued During This Encounter   Medication Reason     fluticasone (FLONASE) 50 mcg/actuation nasal spray Therapy completed     simvastatin (ZOCOR) 20 MG tablet Reorder     traZODone (DESYREL) 50 MG tablet Reorder       Return in about 3 weeks (around 5/7/2019) for follow up appt in 3-4 weeks for follow up and skin biopsy  .    DATA REVIEWED:  Additional History from Old Records Summarized (2): Reviewed last ENT note regarding deviated septum  Decision to Obtain Records (1):    Radiology Tests Summarized or Ordered (1): -MRI ordered  Labs Reviewed or Ordered (1): -Labs reviewed from occupational medicine and will be scanned into the chart  Medicine Test Summarized or Ordered (1): -Spirometry reviewed  Independent Review of EKG, X-RAY, or RAPID STREP (2 each): EKG independently reviewed and is normal    The visit lasted a total of 40 minutes face to face with the patient. Over 50% of the time was spent counseling and educating the patient     CHIEF COMPLAINT:  Chief Complaint   Patient presents with     Medication Management     Fasting labs done March 2019     Rash     red itchy spots on bilateral arms x 2 months     Bunions     concerns of possible bunion on right foot       HISTORY OF PRESENT ILLNESS:  Brennan Vazquez is a 54 y.o. male presents today to follow-up on several medical concerns  -wears glasses now.  Ophthalmology had no other concerns  Retired from [employer] this year and still working [employer]  Isonass coming up on arms over a month. Sometimes itch but not typically.  No one else in house with symptoms nor at work..  No changes soaps or detergents. No med changes. +forearms, worse lesion on the R. Dove soap, lubriderm lotion daily.   Was using desonide cream for face-was using daily for rosacea and does not feel like it works well.   -bone sticking out on foot. Right foot-noticed last week. . Doesn't hurt. Cyst on R toe.   -wondering  about sleep study.  Snoring and restless legs. Slept quarter to 10 until 2:30 . On the trazodone. Wife sleeping in another room.   24 hour shifts. No side effects from trazodone.  He is not feeling he wants to switch from the trazodone yet at this time.    -had switched metoprolol back to atenolol.  He thought it was due to side effects from the metoprolol but cannot recall why it.  He is tolerating atenolol fine with normal blood pressure  -pressure in head but not as constant. 4 days out of week and consistent hours awake. Not sure if stress? Consistent throughout the year. Times driving that has to really pay attention. All around eyes and back side of eyes. Advil for treatment.   Did seek  from ENT regarding this and all that was found was deviated septum.  He does not want septoplasty.  He is using Flonase and saline spray. All times of the year. Been playing hockey couple days a week, feel it after. No nausea vomiting or double vision. Been going on 2 years.   REVIEW OF SYSTEMS:    Comprehensive review of systems is negative except as noted in the HPI.     PFSH:  Tobacco use and medications reviewed below.     TOBACCO USE:  Social History     Tobacco Use   Smoking Status Never Smoker   Smokeless Tobacco Never Used       VITALS:  Vitals:    04/16/19 0855   BP: 120/74   Pulse: 65   Resp: 20   Temp: 97.3  F (36.3  C)   TempSrc: Oral   Weight: 182 lb 6.4 oz (82.7 kg)     Wt Readings from Last 3 Encounters:   04/17/19 181 lb (82.1 kg)   04/16/19 182 lb 6.4 oz (82.7 kg)   05/31/18 175 lb 14.4 oz (79.8 kg)       PHYSICAL EXAM:  Constitutional:  Reveals a 54 y.o. male in NAD.  Vitals:  Per nursing notes.  Neck:  Supple, thyroid not palpable.  Cardiac:  Regular rate and rhythm without murmurs, rubs, or gallops. Carotids without bruits. Legs without edema. Palpation of the radial pulse regular.  Lungs: Clear.  Respiratory effort normal.  Skin:   Several papules on forearms worse on the right forearm.  There is  also a palpable cyst along his right first MTP and other lipomas along his body  Rheumatologic: Normal joints and nails of the hands.  Neurologic:  Cranial nerves II-XII intact.   Negative pronator drift 5 out of 5 muscle strength bilateral extraocular motion intact without nystagmus pupils equal and reactive to light bilateral  Psychiatric:  Mood appropriate, memory intact.          MEDICATIONS:  Current Outpatient Medications   Medication Sig Dispense Refill     alendronate (FOSAMAX) 70 MG tablet Take 1 tablet (70 mg total) by mouth every 7 days. Take in the morning on an empty stomach with a full glass of water 30 minutes before food 12 tablet 3     aspirin 81 MG EC tablet Take 81 mg by mouth daily.       atenolol (TENORMIN) 25 MG tablet Take 1 tablet (25 mg total) by mouth daily. 90 tablet 4     calcium carbonate-vitamin D3 (CALTRATE 600 PLUS D3) 600 mg(1,500mg) -400 unit per tablet Take 1 tablet by mouth daily.       desonide (DESOWEN) 0.05 % cream APPLY AN INCH OF CREAM TO THE AFFECTED AREA AS DIRECTED AS NEEDED 60 g 0     fluticasone (FLONASE) 50 mcg/actuation nasal spray SHAKE LIQUID AND USE 2 SPRAYS IN EACH NOSTRIL EVERY DAY 48 g 3     loratadine-pseudoephedrine (CLARITIN-D 12 HOUR) 5-120 mg Tb12 Take 1 tablet by mouth 2 (two) times a day. 60 tablet 2     omeprazole (PRILOSEC) 20 MG capsule TAKE 1 CAPSULE(20 MG) BY MOUTH DAILY BEFORE BREAKFAST 90 capsule 0     simvastatin (ZOCOR) 20 MG tablet Take 1 tablet (20 mg total) by mouth at bedtime. 90 tablet 3     sodium chloride (OCEAN) 0.65 % nasal spray Inhale 2 sprays into each nostril four times a day prn for nasal congestion 30 mL 12     traZODone (DESYREL) 50 MG tablet Take 1 tablet (50 mg total) by mouth at bedtime. 90 tablet 3     clobetasol (TEMOVATE) 0.05 % cream Apply to affected lesions on arms once to twice daily as directed 30 g 0     metroNIDAZOLE (METROGEL) 1 % gel Apply gel to face once daily as directed for rosacea 60 g 0     No current  facility-administered medications for this visit.

## 2021-05-27 NOTE — PROGRESS NOTES
Dear  Ariela Malcolm Do  [address] [place] [address]    Thank you for the opportunity to participate in the care of  Brennan Vazquez.    Brennan Vazquez is sent by Ariela Malcolm DO for a sleep consultation regarding insomnia.       He has a history of anxiety, HTN, insomnia, and RLS.    Schedule - Was working FT as [employer]  and PT at [employer] .  Has 24-hour shifts with on-call coverage and doesn't sleep well due to poor schedule.   When he was PT with [employer] was on call 24-7 until retiring a few weeks ago.     Will work [employer], then ThSa[employer], then 5 day break and schedule repeats every 3 weeks.  Shifts run 8 AM to 8 AM but often will get in at 7 AM.  Will go out on 3 - 5 calls per shift but distribution varies.  Even though he doesn't go out for every call he will get woken up during the night if he sleeps (total of about 10 calls per shift).   On non-work days will get out of bed by 8 AM.  No napping. Getting into bed around 10 PM. Thinks he needs around 7 - 8 hours per night.  Takes Trazodone 50 mg and melatonin 5 mg every non-work day around 9:30 - 10 PM.  Often watching TV in bed as he is falling asleep.  Between 2:30 - 4 AM will wake up then sleep is fragmented for the rest of the night.    Sleep Disordered Breathing - Snores loudly and wife sometimes leaves the room.  Does have snort arousals but no witnessed apneas.   Has nocturia 1 - 2 times per night.  No nocturnal GERD.   Upon waking feels tired.  He reports morning headaches.  No morning dry mouth.  Occasional morning sinus congestion.   Patient was counseled on the importance of driving while alert, to pull over if drowsy, or nap before getting into the vehicle if sleepy.    Movement/Behaviors - No somniloquy.  No somnambulism.  No sleep related eating.  No dream enactment behavior.   He denies bruxism.    Patient reports typical restless legs syndrome symptoms if he gets really tired, but symptoms occur maybe 3  nights per month.  Wife is concerned about leg twitches every night.    Alcohol use - 2 - 3 nights per week 2 - 3 beers.  Caffeine intake - coffee occasionally.  Tobacco exposure - none  Illicit substances - none    Lives with wife.  Has 1 pet, a cat.  Cat sleeps in their bed.    Does have family history of snoring in father (thinks father may have been diagnosed with Obstructive Sleep Apnea but is not wearing CPAP).    Epworths Sleepiness Scale 4/17/2019   Sitting and reading 1   Watching TV 1   Sitting, inactive in a public place (e.g. a theatre or a meeting) 1   As a passenger in a car for an hour without a break 1   Lying down to rest in the afternoon when circumstances permit 2   Sitting and talking to someone 0   Sitting quietly after a lunch without alcohol 1   In a car, while stopped for a few minutes in traffic 0   Total score 7     Past Medical History:  Past Medical History:   Diagnosis Date     Anxiety      HLD (hyperlipidemia)      HTN (hypertension)      Osteoarthritis of left hip 6/5/2017     Osteopenia        Problem List:  Patient Active Problem List    Diagnosis Date Noted     Pressure in head 04/16/2019     Colon polyp 2017 10/15/2017     Overview Note:     Every 5 years        Seasonal allergies 06/05/2017     Insomnia 06/05/2017     Overview Note:     Difficulty staying asleep        Multiple lipomas 11/17/2016     Osteopenia      Overview Note:     Created by Conversion    Replacement Utility updated for latest IMO load       Hyperlipidemia      Overview Note:     Created by Conversion         Rosacea      Overview Note:     Created by Conversion         Anxiety      Overview Note:     Created by Conversion    Replacement Utility updated for latest IMO load       Restless Legs Syndrome      Overview Note:     Created by Conversion         Hypertension      Overview Note:     Created by Conversion    Replacement Utility updated for latest IMO load          Past Surgical History:  Past Surgical  History:   Procedure Laterality Date     TOTAL HIP ARTHROPLASTY Left 08/2017    [facility]         Meds:  Current Outpatient Medications   Medication Sig Dispense Refill     alendronate (FOSAMAX) 70 MG tablet Take 1 tablet (70 mg total) by mouth every 7 days. Take in the morning on an empty stomach with a full glass of water 30 minutes before food 12 tablet 3     aspirin 81 MG EC tablet Take 81 mg by mouth daily.       atenolol (TENORMIN) 25 MG tablet Take 1 tablet (25 mg total) by mouth daily. 90 tablet 4     calcium carbonate-vitamin D3 (CALTRATE 600 PLUS D3) 600 mg(1,500mg) -400 unit per tablet Take 1 tablet by mouth daily.       clobetasol (TEMOVATE) 0.05 % cream Apply to affected lesions on arms once to twice daily as directed 30 g 0     desonide (DESOWEN) 0.05 % cream APPLY AN INCH OF CREAM TO THE AFFECTED AREA AS DIRECTED AS NEEDED 60 g 0     fluticasone (FLONASE) 50 mcg/actuation nasal spray SHAKE LIQUID AND USE 2 SPRAYS IN EACH NOSTRIL EVERY DAY 16 g 5     fluticasone (FLONASE) 50 mcg/actuation nasal spray SHAKE LIQUID AND USE 2 SPRAYS IN EACH NOSTRIL EVERY DAY 48 g 3     loratadine-pseudoephedrine (CLARITIN-D 12 HOUR) 5-120 mg Tb12 Take 1 tablet by mouth 2 (two) times a day. 60 tablet 2     metroNIDAZOLE (METROGEL) 1 % gel Apply gel to face once daily as directed for rosacea 60 g 0     omeprazole (PRILOSEC) 20 MG capsule TAKE 1 CAPSULE(20 MG) BY MOUTH DAILY BEFORE BREAKFAST 90 capsule 0     simvastatin (ZOCOR) 20 MG tablet Take 1 tablet (20 mg total) by mouth at bedtime. 90 tablet 3     sodium chloride (OCEAN) 0.65 % nasal spray Inhale 2 sprays into each nostril four times a day prn for nasal congestion 30 mL 12     traZODone (DESYREL) 50 MG tablet Take 1 tablet (50 mg total) by mouth at bedtime. 90 tablet 3     No current facility-administered medications for this visit.         Allergies:  Patient has no known allergies.     Social History:  Social History     Socioeconomic History     Marital status:       Spouse name: Not on file     Number of children: 2     Years of education: Not on file     Highest education level: Not on file   Occupational History     Occupation:  - [employer]   Social Needs     Financial resource strain: Not on file     Food insecurity:     Worry: Not on file     Inability: Not on file     Transportation needs:     Medical: Not on file     Non-medical: Not on file   Tobacco Use     Smoking status: Never Smoker     Smokeless tobacco: Never Used   Substance and Sexual Activity     Alcohol use: Yes     Drug use: No     Sexual activity: Not on file   Lifestyle     Physical activity:     Days per week: Not on file     Minutes per session: Not on file     Stress: Not on file   Relationships     Social connections:     Talks on phone: Not on file     Gets together: Not on file     Attends Druze service: Not on file     Active member of club or organization: Not on file     Attends meetings of clubs or organizations: Not on file     Relationship status: Not on file     Intimate partner violence:     Fear of current or ex partner: Not on file     Emotionally abused: Not on file     Physically abused: Not on file     Forced sexual activity: Not on file   Other Topics Concern     Not on file   Social History Narrative    His daughters are both teachers -  and art. He has two grandchildren now, a 4 year old girl and 2 year old boy. He has worked for [employer] for 22 years and [employer] for 19 years. 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         Family History:  Family History   Problem Relation Age of Onset     Hypertension Mother      Hyperlipidemia Mother      Hypertension Maternal Aunt      Hypertension Maternal Uncle      Hypertension Maternal Grandmother      Breast cancer Maternal Grandmother      Hypertension Maternal Grandfather       Review of Systems:   Constitutional: Negative except as noted in HPI.    Eyes: Negative except as noted in HPI.   ENT: Negative except as noted in HPI.   Cardiovascular: Negative except as noted in HPI.   Respiratory: Negative except as noted in HPI.   Gastrointestinal: Negative except as noted in HPI.   Genitourinary: Negative except as noted in HPI.   Musculoskeletal: Negative except as noted in HPI.   Integumentary: Negative except as noted in HPI.   Neurological: Negative except as noted in HPI.   Psychiatric: Negative except as noted in HPI.   Endocrine: Negative except as noted in HPI.   Hematologic/Lymphatic: Negative except as noted in HPI.      Physical Exam:  /72 (Patient Site: Right Arm, Patient Position: Sitting, Cuff Size: Adult Regular)   Pulse 68   Ht 6' (1.829 m)   Wt 181 lb (82.1 kg)   SpO2 99%   BMI 24.55 kg/m    General appearance: No apparent distress, well groomed.    HEENT:   Head: Normocephalic, atraumatic.  Eyes: PERRL  Nose: Nares patent.  No exudate.  No septal deviation noted.  Mouth: Teeth: Normal   Tongue: Normal  Oropharynx:  Mallampati Classification: II    Tonsils: Grade 1 Bilateral    Uvula: Normal    Neck: Supple without bruit.      Cardiac: Regular rate and rhythm.  No murmurs.  Radial pulses are strong and symmetric.  Pulmonary: Symmetric air movement, lungs clear to auscultation bilaterally.  Musculoskeletal: No edema noted.  No clubbing or cyanosis.  Skin: Warm, dry, intact.  Neurologic: Alert and oriented to person, place and time   Gait is normal.  Psychiatric: Affect pleasant.  Mood good.     Labs/Studies:  Lab Results   Component Value Date    WBC 5.4 10/12/2017    HGB 14.3 10/12/2017    HCT 43.2 10/12/2017    MCV 88 10/12/2017     10/12/2017         Chemistry        Component Value Date/Time     10/12/2017 1155    K 4.3 10/12/2017 1155     10/12/2017 1155    CO2 29 10/12/2017 1155    BUN 10 10/12/2017 1155    CREATININE 0.82 10/12/2017 1155    GLU 94 10/12/2017 1155        Component Value Date/Time    CALCIUM 10.1  10/12/2017 1155    ALKPHOS 85 10/12/2017 1155    AST 16 10/12/2017 1155    ALT 20 10/12/2017 1155    BILITOT 0.7 10/12/2017 1155        No results found for: FERRITIN  Lab Results   Component Value Date    TSH 1.11 11/15/2016     Lab Results   Component Value Date    HGBA1C 5.2 10/12/2017       Assessment and Plan:  1. Snoring  I have a high suspicion for GERALD based on presence of snoring and gender. We discussed pathophysiology of obstructive sleep apnea, testing with overnight polysomnography, and treatment modalities (CPAP only discussed at this visit). We discussed consequences of untreated GERALD. Patient is interested in treatment and willing to undergo overnight sleep testing.    Home sleep testing is inappropriate for this patient due to RLS and severe insomnia.  PSG has been ordered today.      In case of insomnia during the study: patient has been instructed to bring home medications.    - Split Night Sleep Study; Future    2. Shift work sleep disorder  Discussed issues around developing healthy sleep habits with longstanding shift work. Based on work needs there is little to be done to minimize sleep issues so we will focus on days off.    3. Insomnia, unspecified type  Discussion with patient regarding sleep hygiene as initial topic for improving sleep continuity. Start with sleep restriction.    4. Fatigue, unspecified type  Combination of possible GERALD and SWSD per #4. Eval after testing.  - Split Night Sleep Study; Future    5. Restless Legs Syndrome  Mild symptoms currently but may interfere with HST.    - Split Night Sleep Study; Future     Patient verbalized understanding of these issues, agrees with the plan and all questions were answered today. Patient was given an opportuntity to voice any other symptoms or concerns not listed above. Patient did not have any other symptoms or concerns.        Hardeep Burris MD  [facility] Board Certified in Internal Medicine and Sleep Medicine  [facility]  [facility].

## 2021-05-28 ASSESSMENT — ANXIETY QUESTIONNAIRES
GAD7 TOTAL SCORE: 10
GAD7 TOTAL SCORE: 15

## 2021-05-28 NOTE — TELEPHONE ENCOUNTER
[facility]    Medication Name: Metronidazole 1% gel    Insurance Plan: [employer]   [facility]:    Patient ID Number: [id number]    Please start PA process

## 2021-05-28 NOTE — TELEPHONE ENCOUNTER
Central PA team  [phone]  Pool: HE PA MED ([id number])          PA has been initiated.       PA form completed and faxed insurance via Cover My Meds     Key:  [id number]     Medication:  METRONIDAZOLE 1% GEL    Insurance:  [employer]        Response will be received via fax and may take up to 5-10 business days depending on plan

## 2021-05-29 NOTE — PROGRESS NOTES
Sleep Study Review Visit  He is a 54 y.o. y/o male patient who comes to the sleep medicine clinic for sleep study review.    We had an extensive conversation to review the results of his sleep study.  The overnight polysomnography was completed with a digital sleep system using the international 10-20 electrode placement for recording EEG, EOG, EMG from chin, ECG, respiratory effort, oximetry, body position, airflow, nasal pressure, snoring sound, pulse rate and limb movement channels.  The study was completed as a diagnostic study.  We reviewed the oxygen saturation graph as well as the result tables from the report.    Reports he slept ok and wishes he could have slept in later.  Study showed 17 resp events, 15 were supine and 8/17 were REM supine.  Supine and REM-supine exacerbations noted.    PLMs noted during the first few chunks of sleep, but without arousals.    Has mild RLS complaints regularly  Iron level was checked in beginning of March.      Physical Exam:  /62 (Patient Site: Right Arm, Patient Position: Sitting, Cuff Size: Adult Regular)   Pulse 70   Ht 6' (1.829 m)   Wt 177 lb 6.4 oz (80.5 kg)   SpO2 99%   BMI 24.06 kg/m    General appearance: No apparent distress, well groomed.  Head: Normocephalic, atraumatic.  Musculoskeletal: No edema noted.  No clubbing or cyanosis.  Skin: Warm, dry, intact.  Neurologic: Alert and oriented to person, place and time   Gait is normal.  Psychiatric: Affect pleasant.  Mood fine.     Labs/Studies:  - We reviewed the results of the overnight PSG as described on the HPI.     Assessment and Plan:  In summary Brennan Vazquez is a 54 y.o. year old male here for study results.  We had an extensive conversation to review the results of his sleep study    1. Snoring  Discussed treatment options for snoring without sleep apnea (see PI)    2. Restless Legs Syndrome  Suspect with PLMs and symptoms, RLS may be worse than expected.  Discussed med or non-med  treatments.  Iron normal.  Prior trial of Requip didn't go well (morning grogginess) so will try Gabapentin.  - gabapentin (NEURONTIN) 300 MG capsule; Take 1 capsule (300 mg total) by mouth at bedtime.  Dispense: 30 capsule; Refill: 11    F/u via mychart.    Patient verbalized understanding of these issues, agrees with the plan and all questions were answered today. Patient was given an opportuntity to voice any other symptoms or concerns not listed above. Patient did not have any other symptoms or concerns.     MD VASQUEZ Jimenez Board Certified in Internal Medicine and Sleep Medicine  [facility].

## 2021-05-29 NOTE — PATIENT INSTRUCTIONS - HE
Options for snoring include:  Mouthguard type devices  - OTC SnoreRx or other snore guards typically runs around 50 - 100 dollars.  Similar devices on market. They don't often work that great.  - Professionally made snoring device typically more expensive but may run 1200 - 1800 dollar range...    EPAP device:  - Provent FDA approved 80 dollars/month  - Theravent one-off OTC device 1 - 2 dollars per day    Breath-Right strips can sometimes make a difference.    Surgery for snoring   - Cost is typically more than either above; I would refer you to an ear-nose-throat surgeon if you were considering this.      There is a snoring program/emi that the [employer] developed.   It is called Soundly - http://sleepsound.ly/ and is available for iphone or android phones.    CPAP but usually only covered for Obstructive Sleep Apnea and costs 3000 dollars through insurance (you can probably get for under 1000 dollars with a reliable online vendor).      Finally, exercise and weight loss can be beneficial in improving snoring.

## 2021-05-30 VITALS — WEIGHT: 181 LBS | BODY MASS INDEX: 24.55 KG/M2

## 2021-05-31 ENCOUNTER — RECORDS - HEALTHEAST (OUTPATIENT)
Dept: ADMINISTRATIVE | Facility: CLINIC | Age: 56
End: 2021-05-31

## 2021-05-31 VITALS — BODY MASS INDEX: 23.6 KG/M2 | WEIGHT: 174 LBS

## 2021-05-31 VITALS — HEIGHT: 72 IN | BODY MASS INDEX: 23.57 KG/M2 | WEIGHT: 174 LBS

## 2021-05-31 VITALS — BODY MASS INDEX: 23.84 KG/M2 | HEIGHT: 72 IN | WEIGHT: 176 LBS

## 2021-05-31 VITALS — WEIGHT: 177.44 LBS | BODY MASS INDEX: 24.03 KG/M2 | HEIGHT: 72 IN

## 2021-06-01 VITALS — WEIGHT: 175.9 LBS | BODY MASS INDEX: 23.83 KG/M2 | HEIGHT: 72 IN

## 2021-06-01 NOTE — TELEPHONE ENCOUNTER
RN cannot approve Refill Request    RN can NOT refill this medication Protocol failed and NO refill given. Last office visit: 4/16/2019 Ariela Malcolm DO Last Physical: Visit date not found Last MTM visit: Visit date not found Last visit same specialty: 4/16/2019 Ariela Malcolm DO.  Next visit within 3 mo: Visit date not found  Next physical within 3 mo: Visit date not found      Jo Liu, [facility] Triage/Med Refill 9/8/2019    Requested Prescriptions   Pending Prescriptions Disp Refills     alendronate (FOSAMAX) 70 MG tablet [Pharmacy Med Name: ALENDRONATE 70MG TABLETS] 12 tablet 0     Sig: TAKE 1 TABLET BY MOUTH EVERY 7 DAYS. TAKE IN THE MORNING ON AN EMPTY STOMACH WITH A FULL GLASS OF WATER 30 MINUTES BEFORE FOOD.       Biphosphonates Refill Protocol Failed - 9/7/2019 10:20 AM        Failed - Serum creatinine in last 12 months     Creatinine   Date Value Ref Range Status   10/12/2017 0.82 0.70 - 1.30 mg/dL Final             Passed - PCP or prescribing provider visit in last 12 months     Last office visit with prescriber/PCP: 4/16/2019 Ariela Malcolm DO OR same dept: 4/16/2019 Ariela Malcolm DO OR same specialty: 4/16/2019 Ariela Malcolm DO  Last physical: Visit date not found Last MTM visit: Visit date not found   Next visit within 3 mo: Visit date not found  Next physical within 3 mo: Visit date not found  Prescriber OR PCP: Ariela Malcolm DO  Last diagnosis associated with med order: There are no diagnoses linked to this encounter.  If protocol passes may refill for 12 months if within 3 months of last provider visit (or a total of 15 months).

## 2021-06-01 NOTE — TELEPHONE ENCOUNTER
Requested Prescriptions     Pending Prescriptions Disp Refills     metroNIDAZOLE (METROGEL) 1 % gel 60 g 0     Sig: Apply gel to face once daily as directed for rosacea

## 2021-06-02 VITALS — WEIGHT: 181 LBS | BODY MASS INDEX: 24.52 KG/M2 | HEIGHT: 72 IN

## 2021-06-02 VITALS — WEIGHT: 182.4 LBS | BODY MASS INDEX: 24.74 KG/M2

## 2021-06-03 VITALS — BODY MASS INDEX: 24.55 KG/M2 | BODY MASS INDEX: 24.55 KG/M2 | HEIGHT: 72 IN | WEIGHT: 181 LBS

## 2021-06-03 VITALS — BODY MASS INDEX: 24.03 KG/M2 | HEIGHT: 72 IN | WEIGHT: 177.4 LBS

## 2021-06-04 NOTE — TELEPHONE ENCOUNTER
RN cannot approve Refill Request    RN can NOT refill this medication PCP messaged that patient is overdue for Labs. Last office visit: 4/16/2019 Ariela Malcolm DO Last Physical: Visit date not found Last MTM visit: Visit date not found Last visit same specialty: 4/16/2019 Ariela Malcolm DO.  Next visit within 3 mo: Visit date not found  Next physical within 3 mo: Visit date not found      Magaly Brumfield, [facility] Triage/Med Refill 12/22/2019    Requested Prescriptions   Pending Prescriptions Disp Refills     alendronate (FOSAMAX) 70 MG tablet [Pharmacy Med Name: ALENDRONATE 70MG TABLETS] 12 tablet 0     Sig: TAKE 1 TABLET BY MOUTH EVERY 7 DAYS. TAKE IN THE MORNING ON AN EMPTY STOMACH WITH A FULL GLASS OF WATER 30 MINUTES BEFORE FOOD.       Biphosphonates Refill Protocol Failed - 12/19/2019  9:03 AM        Failed - Serum creatinine in last 12 months     Creatinine   Date Value Ref Range Status   10/12/2017 0.82 0.70 - 1.30 mg/dL Final             Passed - PCP or prescribing provider visit in last 12 months     Last office visit with prescriber/PCP: 4/16/2019 Ariela Malcolm DO OR same dept: 4/16/2019 Ariela Malcolm DO OR same specialty: 4/16/2019 Ariela Malcolm DO  Last physical: Visit date not found Last MTM visit: Visit date not found   Next visit within 3 mo: Visit date not found  Next physical within 3 mo: Visit date not found  Prescriber OR PCP: Ariela Malcolm DO  Last diagnosis associated with med order: 1. Osteopenia  - alendronate (FOSAMAX) 70 MG tablet [Pharmacy Med Name: ALENDRONATE 70MG TABLETS]; TAKE 1 TABLET BY MOUTH EVERY 7 DAYS. TAKE IN THE MORNING ON AN EMPTY STOMACH WITH A FULL GLASS OF WATER 30 MINUTES BEFORE FOOD.  Dispense: 12 tablet; Refill: 0    2. GERD (gastroesophageal reflux disease)  - omeprazole (PRILOSEC) 20 MG capsule [Pharmacy Med Name: OMEPRAZOLE 20MG CAPSULES]; TAKE 1 CAPSULE(20 MG) BY MOUTH DAILY BEFORE BREAKFAST  Dispense: 90 capsule;  Refill: 0    If protocol passes may refill for 12 months if within 3 months of last provider visit (or a total of 15 months).             atenolol (TENORMIN) 25 MG tablet [Pharmacy Med Name: ATENOLOL 25MG TABLETS] 90 tablet 4     Sig: TAKE 1 TABLET BY MOUTH DAILY       Beta-Blockers Refill Protocol Passed - 12/19/2019  9:03 AM        Passed - PCP or prescribing provider visit in past 12 months or next 3 months     Last office visit with prescriber/PCP: 4/16/2019 Ariela Malcolm DO OR same dept: 4/16/2019 Ariela Malcolm DO OR same specialty: 4/16/2019 Ariela Malcolm DO  Last physical: Visit date not found Last MTM visit: Visit date not found   Next visit within 3 mo: Visit date not found  Next physical within 3 mo: Visit date not found  Prescriber OR PCP: Ariela Malcolm DO  Last diagnosis associated with med order: 1. Osteopenia  - alendronate (FOSAMAX) 70 MG tablet [Pharmacy Med Name: ALENDRONATE 70MG TABLETS]; TAKE 1 TABLET BY MOUTH EVERY 7 DAYS. TAKE IN THE MORNING ON AN EMPTY STOMACH WITH A FULL GLASS OF WATER 30 MINUTES BEFORE FOOD.  Dispense: 12 tablet; Refill: 0    2. GERD (gastroesophageal reflux disease)  - omeprazole (PRILOSEC) 20 MG capsule [Pharmacy Med Name: OMEPRAZOLE 20MG CAPSULES]; TAKE 1 CAPSULE(20 MG) BY MOUTH DAILY BEFORE BREAKFAST  Dispense: 90 capsule; Refill: 0    If protocol passes may refill for 12 months if within 3 months of last provider visit (or a total of 15 months).             Passed - Blood pressure filed in past 12 months     BP Readings from Last 1 Encounters:   05/30/19 110/62             omeprazole (PRILOSEC) 20 MG capsule [Pharmacy Med Name: OMEPRAZOLE 20MG CAPSULES] 90 capsule 0     Sig: TAKE 1 CAPSULE(20 MG) BY MOUTH DAILY BEFORE BREAKFAST       GI Medications Refill Protocol Passed - 12/19/2019  9:03 AM        Passed - PCP or prescribing provider visit in last 12 or next 3 months.     Last office visit with prescriber/PCP: 4/16/2019 Ariela Malcolm   OR same dept: 4/16/2019 Ariela Malcolm DO OR same specialty: 4/16/2019 Ariela Malcolm DO  Last physical: Visit date not found Last MTM visit: Visit date not found   Next visit within 3 mo: Visit date not found  Next physical within 3 mo: Visit date not found  Prescriber OR PCP: Ariela Malcolm DO  Last diagnosis associated with med order: 1. Osteopenia  - alendronate (FOSAMAX) 70 MG tablet [Pharmacy Med Name: ALENDRONATE 70MG TABLETS]; TAKE 1 TABLET BY MOUTH EVERY 7 DAYS. TAKE IN THE MORNING ON AN EMPTY STOMACH WITH A FULL GLASS OF WATER 30 MINUTES BEFORE FOOD.  Dispense: 12 tablet; Refill: 0    2. GERD (gastroesophageal reflux disease)  - omeprazole (PRILOSEC) 20 MG capsule [Pharmacy Med Name: OMEPRAZOLE 20MG CAPSULES]; TAKE 1 CAPSULE(20 MG) BY MOUTH DAILY BEFORE BREAKFAST  Dispense: 90 capsule; Refill: 0    If protocol passes may refill for 12 months if within 3 months of last provider visit (or a total of 15 months).

## 2021-06-05 VITALS
HEIGHT: 72 IN | WEIGHT: 166.4 LBS | BODY MASS INDEX: 22.54 KG/M2 | DIASTOLIC BLOOD PRESSURE: 78 MMHG | SYSTOLIC BLOOD PRESSURE: 112 MMHG | HEART RATE: 73 BPM | RESPIRATION RATE: 12 BRPM | TEMPERATURE: 96 F

## 2021-06-05 VITALS
SYSTOLIC BLOOD PRESSURE: 118 MMHG | HEART RATE: 73 BPM | DIASTOLIC BLOOD PRESSURE: 80 MMHG | RESPIRATION RATE: 16 BRPM | BODY MASS INDEX: 22.39 KG/M2 | TEMPERATURE: 98 F | WEIGHT: 166.25 LBS

## 2021-06-07 NOTE — TELEPHONE ENCOUNTER
RN cannot approve Refill Request    RN can NOT refill this medication Protocol failed and NO refill given.      Soraya Santos, [facility] Triage/Med Refill 4/6/2020    Requested Prescriptions   Pending Prescriptions Disp Refills     alendronate (FOSAMAX) 70 MG tablet [Pharmacy Med Name: ALENDRONATE 70MG TABLETS] 12 tablet 0     Sig: TAKE 1 TABLET BY MOUTH EVERY 7 DAYS IN THE AM ON AN EMPTY STOMACH WITH A FULL GLASS OF WATER 30 MINUTES BEFORE FOOD       Biphosphonates Refill Protocol Failed - 4/4/2020 10:22 AM        Failed - Serum creatinine in last 12 months     Creatinine   Date Value Ref Range Status   10/12/2017 0.82 0.70 - 1.30 mg/dL Final             Passed - PCP or prescribing provider visit in last 12 months     Last office visit with prescriber/PCP: 4/16/2019 Ariela Malcolm DO OR same dept: 4/16/2019 Ariela Malcolm DO OR same specialty: 4/16/2019 Ariela Malcolm DO  Last physical: Visit date not found Last MTM visit: Visit date not found   Next visit within 3 mo: Visit date not found  Next physical within 3 mo: Visit date not found  Prescriber OR PCP: Ariela Malcolm DO  Last diagnosis associated with med order: 1. Osteopenia  - alendronate (FOSAMAX) 70 MG tablet [Pharmacy Med Name: ALENDRONATE 70MG TABLETS]; TAKE 1 TABLET BY MOUTH EVERY 7 DAYS IN THE AM ON AN EMPTY STOMACH WITH A FULL GLASS OF WATER 30 MINUTES BEFORE FOOD  Dispense: 12 tablet; Refill: 0    If protocol passes may refill for 12 months if within 3 months of last provider visit (or a total of 15 months).

## 2021-06-08 NOTE — TELEPHONE ENCOUNTER
See if he wants to do a virtual appt with me next week and we can plan on setting up xrays as well. If he can send pics via [employer] of his labs we can discuss as well at his virtual

## 2021-06-08 NOTE — TELEPHONE ENCOUNTER
RN cannot approve Refill Request    RN can NOT refill this medication Protocol failed and NO refill given.       Soraya Santos, [facility] Triage/Med Refill 5/15/2020    Requested Prescriptions   Pending Prescriptions Disp Refills     simvastatin (ZOCOR) 20 MG tablet [Pharmacy Med Name: SIMVASTATIN 20MG TABLETS] 90 tablet 3     Sig: TAKE 1 TABLET BY MOUTH AT BEDTIME       Statins Refill Protocol (Hmg CoA Reductase Inhibitors) Failed - 5/14/2020  9:57 PM        Failed - PCP or prescribing provider visit in past 12 months      Last office visit with prescriber/PCP: 4/16/2019 Ariela Malcolm DO OR same dept: Visit date not found OR same specialty: 4/16/2019 Ariela Malcolm DO  Last physical: Visit date not found Last MTM visit: Visit date not found   Next visit within 3 mo: Visit date not found  Next physical within 3 mo: Visit date not found  Prescriber OR PCP: Ariela Malcolm DO  Last diagnosis associated with med order: 1. Hyperlipidemia  - simvastatin (ZOCOR) 20 MG tablet [Pharmacy Med Name: SIMVASTATIN 20MG TABLETS]; TAKE 1 TABLET BY MOUTH AT BEDTIME  Dispense: 90 tablet; Refill: 3    If protocol passes may refill for 12 months if within 3 months of last provider visit (or a total of 15 months).

## 2021-06-08 NOTE — PROGRESS NOTES
"Brennan Vazquez is a 55 y.o. male who is being evaluated via a billable video visit.      The patient has been notified of following:     \"This video visit will be conducted via a call between you and your physician/provider. We have found that certain health care needs can be provided without the need for an in-person physical exam.  This service lets us provide the care you need with a video conversation.  If a prescription is necessary we can send it directly to your pharmacy.  If lab work is needed we can place an order for that and you can then stop by our lab to have the test done at a later time.    Video visits are billed at different rates depending on your insurance coverage. Please reach out to your insurance provider with any questions.    If during the course of the call the physician/provider feels a video visit is not appropriate, you will not be charged for this service.\"    Patient has given verbal consent to a Video visit? Yes    Will anyone else be joining your video visit? No        Video Start Time: 9:15 AM     Video-Visit Details    Type of service:  Video Visit    Video End Time (time video stopped):  945  Originating Location (pt. Location): Home    Distant Location (provider location):  [facility]     Platform used for Video Visit: [employer]    ASSESSMENT and PLAN:  1. 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  Tolerating atenolol fine with no issues.  Again we discussed consideration of other medications especially if he continues to have a chronic fatigue .  He wants to keep for now we can reevaluate after he retires  - atenoloL (TENORMIN) 25 MG tablet; Take 1 tablet (25 mg total) by mouth daily.  Dispense: 90 tablet; Refill: 4    2. Osteopenia  Continue current medication regimen.  Reviewed last bone density scan.  We will plan to repeat scan again in 1 year  - alendronate (FOSAMAX) 70 MG tablet; TAKE 1 TABLET BY MOUTH EVERY 7 DAYS " IN THE AM ON AN EMPTY STOMACH WITH A FULL GLASS OF WATER 30 MINUTES BEFORE FOOD  Dispense: 12 tablet; Refill: 4    3. Hyperlipidemia   Reviewed his cholesterol levels.  LDL is less than 90.  Continue current medication dosing for now  - simvastatin (ZOCOR) 20 MG tablet; Take 1 tablet (20 mg total) by mouth at bedtime.  Dispense: 90 tablet; Refill: 4    4. Rosacea  1% gel was too expensive for patient so I have prescribed a 0.75 for him to see if this is more cost effective.  If not he can let me know and we can readdress the rosacea in another way potentially.  - metroNIDAZOLE (METROGEL) 0.75 % gel; Apply gel to face twice daily for rosacea.  Dispense: 45 g; Refill: 5    5. Restless Legs Syndrome  -Patient never started with the gabapentin.  He does have inadequate sleep as noted during his polysomnogram.  We discussed alternative ways for treating his insomnia and suggested trialing the gabapentin instead but we have opted to do the zolpidem as noted below.  Cautioned him about dangers of fatigue while driving    6. 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  I suggested trialing zolpidem instead.  We discussed possible adverse effects of the medication and sleepwalking.  He will let me know within the month if this is working well for him  - zolpidem (AMBIEN) 10 mg tablet; Take 1 tablet (10 mg total) by mouth at bedtime as needed for sleep.  Dispense: 30 tablet; Refill: 0    7. Pressure in head  Possibly secondary to inadequate sleep.  He wants to readdress this after he retires within the year and thinks that that is the source of his issues    8. Snoring  -Has had polysomnogram.  I will send him information about positional devices    9. Osteoarthritis of hip, unspecified laterality, unspecified osteoarthritis type  Referral  placed to Dr. de los santos from [facility].  I suggested holding off on the imaging at our location and just doing it with them since they likely will be evaluating him for surgery.  In the past corticosteroid injections have not helped and he would rather just move forward with surgery.  Discussed that likely he could still perform his job with 2 replaced hips.  - Ambulatory referral to Orthopedics             There are no Patient Instructions on file for this visit.    Orders Placed This Encounter   Procedures     Varicella Zoster, Recombinant Vaccine IM     Standing Status:   Standing     Number of Occurrences:   2     Standing Expiration Date:   6/15/2021     Ambulatory referral to Orthopedics     Referral Priority:   Routine     Referral Type:   Consultation     Referral Reason:   Evaluation and Treatment     Requested Specialty:   Orthopedics     Number of Visits Requested:   1     Medications Discontinued During This Encounter   Medication Reason     clobetasol (TEMOVATE) 0.05 % cream Therapy completed     gabapentin (NEURONTIN) 300 MG capsule Therapy completed     metroNIDAZOLE (METROGEL) 1 % gel Alternate therapy     sodium chloride (OCEAN) 0.65 % nasal spray Therapy completed     atenolol (TENORMIN) 25 MG tablet Reorder     alendronate (FOSAMAX) 70 MG tablet Reorder     simvastatin (ZOCOR) 20 MG tablet Reorder       No follow-ups on file.    DATA REVIEWED:  Reviewed office note regarding sleep evaluation+2    labs reviewed (1): Reviewed labs patient sent through my chart for fasting physical.  Mildly elevated glucose fasting  Radiology review.  Reviewed last bone density scan from 2018.  Also reviewed x-rays that were done in 2016 of bilateral hips  Review normal spirometry testing and EKG  5pt    Recommend follow-up in 1 year or sooner if any further concerns.  He should let me know through my chart how he is doing within the month.      The visit lasted a total of 30 minutes face to face with the  patient. Over 50% of the time was spent counseling and educating the patient     CHIEF COMPLAINT:  Chief Complaint   Patient presents with     Follow-up     Follow up on lab results, meds, right hip        HISTORY OF PRESENT ILLNESS:  Brennan Vazquez is a 55 y.o. male presents for virtual video visit. Wanted to discuss right hip pain. Last time evaluated was a couple years ago when L hip replaced. Bikes and walks and tennis 3 days a week.. Wondering if still can be  if 2 hips replaced. Was 80 percent gone last time had evaluated with x-ray imaging.  L was bone on bone. Steroid didn't help with other. Stretching and aleve. Doesn't hurt constantly or daily. Dr. Wheeler from [facility] is who he had seen.. Surgery went.  Had sleep study done- snoring and RLS. Hoping will get better when retires at 56.   Trazodone takes only on nights not at work. Has grogginess -has had ongoing head pressure.  Thinks it is from lack of sleep and thinks it will get better.  Never took the gabapentin.  Also thinks that trazodone causes grogginess on nights that he is not at work and feels it into the daytime.  Has not had any lightheadedness with his atenolol.  Seems to be working okay for him.  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  Continues to take Fosamax without any issues with dental work, no pain into the bone in places that would be considered adverse reaction.  Due for another bone density scan in 1 year    REVIEW OF SYSTEMS:    Comprehensive review of systems is negative except as noted in the HPI.     PFSH:  Tobacco use and medications reviewed below.     TOBACCO USE:  Social History     Tobacco Use   Smoking Status Never Smoker   Smokeless Tobacco Never Used       VITALS:  There were no vitals filed for this visit.  Wt Readings from Last 3 Encounters:   05/30/19 177 lb 6.4 oz (80.5 kg)   05/08/19 181 lb (82.1 kg)   04/17/19 181 lb (82.1  kg)       PHYSICAL EXAM:  GENERAL: Healthy, alert and no distress  EYES: Eyes grossly normal to inspection. No discharge or erythema, or obvious scleral/conjunctival abnormalities.  HENT: Normal cephalic/atraumatic.  External ears, nose and mouth without ulcers or lesions. No nasal drainage visible.  NECK: No asymmetry, masses or scars  RESP: No audible wheeze, cough, or visible cyanosis.  No visible retractions or increased work of breathing.    MS: No gross musculoskeletal defects noted.  Normal range of motion. No visible edema.  SKIN: Visible skin clear. No significant rash, abnormal pigmentation or lesions.  NEURO: Cranial nerves grossly intact. Mentation and speech appropriate for age.  PSYCH: Mentation appears normal, affect normal/bright, judgement and insight intact, normal speech and appearance well-groomed      MEDICATIONS:  Current Outpatient Medications   Medication Sig Dispense Refill     alendronate (FOSAMAX) 70 MG tablet TAKE 1 TABLET BY MOUTH EVERY 7 DAYS IN THE AM ON AN EMPTY STOMACH WITH A FULL GLASS OF WATER 30 MINUTES BEFORE FOOD 12 tablet 4     aspirin 81 MG EC tablet Take 81 mg by mouth daily.       atenoloL (TENORMIN) 25 MG tablet Take 1 tablet (25 mg total) by mouth daily. 90 tablet 4     calcium carbonate-vitamin D3 (CALTRATE 600 PLUS D3) 600 mg(1,500mg) -400 unit per tablet Take 1 tablet by mouth daily.       desonide (DESOWEN) 0.05 % cream APPLY AN INCH OF CREAM TO THE AFFECTED AREA AS DIRECTED AS NEEDED 60 g 0     fluticasone (FLONASE) 50 mcg/actuation nasal spray SHAKE LIQUID AND USE 2 SPRAYS IN EACH NOSTRIL EVERY DAY 48 g 3     loratadine-pseudoephedrine (CLARITIN-D 12 HOUR) 5-120 mg Tb12 Take 1 tablet by mouth 2 (two) times a day. Prn for allergy symptoms. (Patient taking differently: Take 1 tablet by mouth 2 (two) times a day as needed. Prn for allergy symptoms.) 60 tablet 0     omeprazole (PRILOSEC) 20 MG capsule TAKE 1 CAPSULE(20 MG) BY MOUTH DAILY BEFORE BREAKFAST 90 capsule 0      simvastatin (ZOCOR) 20 MG tablet Take 1 tablet (20 mg total) by mouth at bedtime. 90 tablet 4     traZODone (DESYREL) 50 MG tablet Take 1 tablet (50 mg total) by mouth at bedtime. 90 tablet 3     metroNIDAZOLE (METROGEL) 0.75 % gel Apply gel to face twice daily for rosacea. 45 g 5     zolpidem (AMBIEN) 10 mg tablet Take 1 tablet (10 mg total) by mouth at bedtime as needed for sleep. 30 tablet 0     No current facility-administered medications for this visit.            Ariela Malcolm, DO

## 2021-06-10 NOTE — TELEPHONE ENCOUNTER
Controlled Substance Refill Request  Medication Name:   Requested Prescriptions     Pending Prescriptions Disp Refills     zolpidem (AMBIEN) 10 mg tablet [Pharmacy Med Name: ZOLPIDEM 10MG TABLETS] 30 tablet 0     Sig: TAKE 1 TABLET(10 MG) BY MOUTH AT BEDTIME AS NEEDED FOR SLEEP     Date Last Fill: 6/15/20  [facility]ed [facility]: [facility]  Submit electronically to pharmacy  Controlled Substance Agreement on file:   Encounter-Level CSA Scan Date:    There are no encounter-level csa scan date.        Last office visit:  6/15/20

## 2021-06-10 NOTE — PROGRESS NOTES
Assessment/Plan:        Diagnoses and all orders for this visit:    Healthcare maintenance  -     Ambulatory referral for Colonoscopy  -     Comprehensive Metabolic Panel--questions about the high eosinophil count, will recheck today and see if it is in normal range now.     Hyperlipidemia  -     simvastatin (ZOCOR) 10 MG tablet; TAKE ONE TABLET BY MOUTH EVERY NIGHT AT BEDTIME  Dispense: 90 tablet; Refill: 3   Ascvd risk-2%, continue on current treatment  Essential hypertension  -     atenolol (TENORMIN) 25 MG tablet; Take 1 tablet (25 mg total) by mouth daily.  Dispense: 90 tablet; Refill: 3    Cough  -     XR Chest PA and Lateral  -     albuterol (PROAIR HFA;PROVENTIL HFA;VENTOLIN HFA) 90 mcg/actuation inhaler; Inhale 2 puffs every 4 (four) hours as needed for wheezing or shortness of breath.  Dispense: 1 Inhaler; Refill: 0    GERD (gastroesophageal reflux disease)  -     omeprazole (PRILOSEC) 20 MG capsule; Take 1 capsule (20 mg total) by mouth Daily before breakfast.  Dispense: 60 capsule; Refill: 3            Subjective:    Patient ID: Brennan Vazquez is a 52 y.o. male.    HPI patient is here for medication management, to answer his questions about his labs that were drawn at work. He is a  and has a physical assessment done every year. Cholesterol labs: total 146, HDL 51. Has no complaints about the medication he is taking, no bothersome side effects.    Has two complaints; 3 week history of persistent cough, started as a URI but other symptoms have improved and continues with a dry cough that is worse when he wakes up in the morning and with exercise/exertion. Denies shortness of breath, wheezing, fevers, body aches, malaise. Has used inhaler in the past for similar symptoms with good relief.     Also complains of ongoing gastric reflux worse after eating certain meals. Tums is not sufficient to manage his symptoms.         The following portions of the patient's history were reviewed and updated  as appropriate: allergies, current medications, past family history, past medical history, past social history, past surgical history and problem list.    Review of Systems   Constitutional: Negative.    HENT: Negative.    Eyes: Negative.    Respiratory: Positive for cough. Negative for chest tightness, shortness of breath, wheezing and stridor.    Cardiovascular: Negative.    Gastrointestinal: Negative.    Musculoskeletal: Negative.    Skin: Negative.    Neurological: Negative.    Hematological: Negative.    Psychiatric/Behavioral: Negative.              Objective:    Physical Exam   Constitutional: He is oriented to person, place, and time. He appears well-developed and well-nourished.   HENT:   Head: Normocephalic and atraumatic.   Right Ear: External ear normal.   Left Ear: External ear normal.   Nose: Nose normal.   Mouth/Throat: Oropharynx is clear and moist.   Cardiovascular: Normal rate, regular rhythm and normal heart sounds.    Pulmonary/Chest: Effort normal and breath sounds normal. No respiratory distress. He has no wheezes. He has no rales. He exhibits no tenderness.   Lymphadenopathy:        Head (right side): Submandibular adenopathy present.        Head (left side): Submandibular adenopathy present.   Not tender   Neurological: He is alert and oriented to person, place, and time.   Skin: Skin is warm and dry.   Psychiatric: He has a normal mood and affect. His behavior is normal. Judgment and thought content normal.   Nursing note and vitals reviewed.

## 2021-06-11 NOTE — PROGRESS NOTES
Assessment:     Brennan Vazquez was seen in preoperative consultation in preparation for left SOURAV. This is a intermediate risk surgery and the patient has no increased risk for major cardiac complications based on exam and history and appears to be medically stable for the proposed procedure.      52 y.o. male with planned surgery as above.    Known risk factors for perioperative complications: None    Difficulty with intubation is not anticipated.    Cardiac Risk Estimation: Low risk none.    Current medications which may produce withdrawal symptoms if withheld perioperatively: none       Plan:      1. Preoperative workup as follows ECG, hemoglobin, hematocrit, electrolytes.  2. Change in medication regimen before surgery: discontinue ASA 14 days before surgery.  3. Prophylaxis for cardiac events with perioperative beta-blockers: not indicated.  4. Invasive hemodynamic monitoring perioperatively: not indicated.  5. Deep vein thrombosis prophylaxis postoperatively:regimen to be chosen by surgical team.  6. Surveillance for postoperative MI with ECG immediately postoperatively and on postoperative days 1 and 2 AND troponin levels 24 hours postoperatively and on day 4 or hospital discharge (whichever comes first): not indicated.  7. Other measures: none      Subjective:      Brennan Vazquez is a 52 y.o. male who presents to the office today for a preoperative consultation at the request of surgeon  who plans on performing left SOURAV on August 7. This consultation is requested for the specific conditions prompting preoperative evaluation (i.e. because of potential affect on operative risk): none. Planned anesthesia: general and block. The patient has the following known anesthesia issues: none. Patients bleeding risk: no recent abnormal bleeding and no remote history of abnormal bleeding. Patient does not have objections to receiving blood products if needed.    History of present illness: Worsening hip pain  over the past several years, and eventually sought care orthopedics and was decided to pursue surgery.  Patient looking forward to having less pain postoperatively.  Quite healthy active male, patient with history hyperlipidemia, hypertension, anxiety, allergies, osteoarthritis of the left hip.  Patient is well controlled with his conditions on simvastatin, atenolol and was recently weaned off of her Wellbutrin.  He feels his anxiety is under decent enough control although at his next appointment he plans to discuss this with his primary care provider. He has tolerated previous procedures well, without bleeding or anesthesia problem.  No recent chest pain, shortness of breath, palpitations.  No recent cold symptoms, cough, fever or chills.    The following portions of the patient's history were reviewed and updated as appropriate: allergies, current medications, past family history, past medical history, past social history, past surgical history and problem list.    Review of Systems  A 12 point comprehensive review of systems was negative except as noted.      Objective:     /68 (Patient Site: Left Arm, Patient Position: Sitting, Cuff Size: Adult Regular)  Pulse 72  Temp 98.2  F (36.8  C) (Oral)   Resp 16  Ht 6' (1.829 m)  Wt 177 lb 7 oz (80.5 kg)  BMI 24.06 kg/m2  General: Patient appears to be in no acute distress.  Eyes: Inferior palpebral conjunctiva clear and pink, no exudates or tearing. Sclera are white. Eyelids symmetrical, no erythema, flakiness, fasciculations, or ptosis. Pupils react equally to Light and accommodation. EOMS intact. No lid lag, no nystagmus, corneal reflex equal bilaterally, cornea and lens clear, red reflex present, optic disc cream colored with well defined borders. Retina pink, no hemorrhages or exudates.  Ears: No nodules, lesions, masses, discharge or tenderness in auricles or mastoid area. No cerumen in ear canals. Tympanic membranes pearly gray, normal bony landmarks  and cone of light bilaterally, no bulges or perforations.   Oropharynx: Buccal mucosa pink, moist, no lesions. Tongue midline, spongy, pink. Uvula midline. Pharynx with no erythema, no exudates. Tonsils + 1.  Neck: Full range of motion, no pain with palpation of spine or paraspinal muscles.  Lungs: Unlabored. clear breath sounds heard throughout lung fields.   Heart: Regular rate and rhythm.  Abdomen: Soft rounded abdomen, no surgical scars. Bowel sounds heard in all four quadrants; liver, spleen, and kidney not palpable, no tenderness on palpation of abdoen, no CVA tenderness.    Musculoskeletal: muscles appear symmetric, muscle strength appropriate (Bilateral upper and lower extremities strength 5/5) and equal bilaterally full range of active and passive motion, spine and extremities in good alignment.  Neuro: Coordinated, smooth gait, balance, rapid alternating movements, sensory functioning, and cranial nerves II-XII grossly intact. DTR's+2 bilaterally brachioradialis, knee, ankle. Rhomberg s wnl.        Predictors of intubation difficulty:   Morbid obesity? no   Anatomically abnormal facies? no   Prominent incisors? no   Receding mandible? no   Short, thick neck? no   Neck range of motion: normal   Mallampati score: I (soft palate, uvula, fauces, and tonsillar pillars visible)   Dentition: No chipped, loose, or missing teeth.    Cardiographics  ECG: Sinus bradycardia  ST elevation, consider early repolarization  Borderline ECG  No previous ECGs available  Echocardiogram: not done    [facility]  [facility]     Lab Review   Lab Results   Component Value Date     07/12/2017    K 4.2 07/12/2017     07/12/2017    CO2 27 07/12/2017    BUN 10 07/12/2017    CREATININE 0.87 07/12/2017    CALCIUM 9.5 07/12/2017     Lab Results   Component Value Date    WBC 4.6 07/12/2017    HGB 14.7 07/12/2017    HCT 43.9 07/12/2017    MCV 87 07/12/2017     07/12/2017        Jennifer Mullins CNP    This note has been  dictated using voice recognition software. Any grammatical or context distortions are unintentional and inherent to the software

## 2021-06-11 NOTE — PROGRESS NOTES
ASSESSMENT & PLAN:  1. Osteoarthritis of left hip  -Patient's pain has been unrelieved by corticosteroid injections.  He will be following up with Dr. Wheeler at [facility] orthopedics to discuss  surgical replacement.  Referral will be placed for insurance purposes.  He is wary about being out of work for more than several months.  I believe she will be able to return to his job following surgery with appropriate physical therapy and rehabilitation.  He is using tramadol only as needed.  Does not need any refills at this time.  - Ambulatory referral to Orthopedics    2. Seasonal allergies  -Symptoms controlled on fluticasone nasal spray.  Refills provided today.  May also use nondrowsy antihistamine as needed over-the-counter.    3. Insomnia  -Patient has been having difficult time with sleep and so we will trial a course of trazodone 50 mg.  If you would like to take a very small dose he can start with a quarter to half tablet 1 hour before bedtime.  If not having any symptomatic relief with 1 full tablet we may need to escalate the dose.  He intends to take it only on an as-needed basis.  We discussed the risks of daytime grogginess on this medication and he will monitor for that.    4. . Eosinophilia  Elevated eosinophils were noted on heme1 that was obtained for work physical.  At that time he did have a persistent cough and does have seasonal allergies.  We discussed that that could definitely have been the cause of the elevated eosinophilia and they forgot to recheck labs when he came back again for reevaluation.  Since then his cough has resolved and allergies are stable.  Repeat heme 1 reveals normal eosinophilia.  No need for further evaluation with blood smear.  Follow-up as needed  - HM1(CBC and Differential)  - HM1 (CBC with Diff)    6. Elevated glucose  -Reassured patient that his minimally elevated glucose levels fasting of 101 and 1 or 2 are unlikely to be of any concern although I do not know what  they are at when not fasting.  He is healthy and active otherwise.  Will check an A1c for reassurance.  - Glycosylated Hemoglobin A1c    7.  Hypertension well controlled on atenolol 25 mg daily.  Refill medications as needed.  Continue this medication preoperatively with a sip of water  8.  Anxiety-still well controlled on bupropion 100 mg once daily    There are no Patient Instructions on file for this visit.     Orders Placed This Encounter   Procedures     HM1 (CBC with Diff)     Glycosylated Hemoglobin A1c     Ambulatory referral to Orthopedics     Referral Priority:   Routine     Referral Type:   Consultation     Referral Reason:   Evaluation and Treatment     Requested Specialty:   Orthopedics     Number of Visits Requested:   1     Medications Discontinued During This Encounter   Medication Reason     buPROPion (WELLBUTRIN SR) 100 MG 12 hr tablet Reorder     fluticasone (FLONASE) 50 mcg/actuation nasal spray Reorder       No Follow-up on file.     CHIEF COMPLAINT:  Chief Complaint   Patient presents with     Follow-up     Follow up lab work.     Medication Management     Needs refills of meds     Referral     Would like referral for surgeon.  Total hip replacment.         HISTORY OF PRESENT ILLNESS:  Brennan is a 52 y.o. male presenting to the clinic today to follow up on his elevated fasting glucose, elevated eosinophil count, and to discuss his insomnia.      Elevated Eosinophil Count/Recent Cough: He had blood work done at work on 4/13/2017 as part of yearly screening, and his eosinophils were 937. He had been coughing since March 2017 and into May 2017. He was in [place] in March 2017. He saw Elli Espino NP on 5/8/2017 for evaluation of his cough; he had a cough for 5 weeks at that time. He would cough all throughout the day; it was a dry cough, and he would occasionally cough up a clear phlegm. Using the albuterol inhaler he was prescribed was helpful for his cough within a week or two. He has not  needed to use the albuterol inhaler since. He denies kinza history of asthma, but he does have seasonal allergies. He has been using Flonase a few times per week and he does not know if it has been helpful. His allergies are not typically severe for the whole season.     Elevated Fasting Glucose: He notes his fasting glucose always comes back a bit high; it is typically between 100-110. He denies any family history of diabetes.     Insomnia: He has been using tramadol 50 mg for pain at night occasionally, but it has not been very helpful for sleep. He has trouble staying asleep because of his hip pain. He has tried melatonin, Nyquil, and other generic sleep aids. He does not want a permanent sleep aid, but he would like something to help him sleep until his hip is replaced. 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 He does not have significant fatigue beyond what he expects for getting insufficient sleep.     Health Maintenance: He scheduled a colonoscopy for June 26, 2017; he is wondering if he can have a colonoscopy soon before his hip surgery if that is how it works out. He had lung function testing and an annual EKG done with work as part of his screening. He would like to stay on aspirin 81 mg and simvastatin 10 mg daily.     REVIEW OF SYSTEMS:   Left Hip Pain: He is going to see Dr. Alvarenga with [facility] on June 14, 2017 and he is planning to have a total left hip replacement. He still tries to bike but his pain is getting to the point that he cannot stand it. He has had 2 cortisone injections and he did not notice much difference; the lidocaine felt good for 4 hours or so immediately after the injections. He will be having a spinal block and sedation for his surgery and he thinks he will take 3-4 months off work.     Osteopenia: He takes calcium and vitamin D daily.     Anxiety: He has been tolerating Wellbutrin  mg once daily.     Vision: He has glasses  but he does not wear them often; he wears them occasionally at night if he feels like he needs them.     He denies any wheezing, weight loss, cough, chest pain, or heart palpitations. All other systems are negative.     PFSH:     TOBACCO USE:  History   Smoking Status     Never Smoker   Smokeless Tobacco     Not on file        VITALS:  Vitals:    06/05/17 1209   BP: 102/78   Pulse: 62   Resp: 14   Temp: 98.3  F (36.8  C)   TempSrc: Oral   Weight: 176 lb (79.8 kg)   Height: 6' (1.829 m)     Wt Readings from Last 3 Encounters:   06/05/17 176 lb (79.8 kg)   05/08/17 181 lb (82.1 kg)   12/06/16 180 lb (81.6 kg)     Body mass index is 23.87 kg/(m^2).     PHYSICAL EXAM:  GENERAL:  Reveals an alert 52 y.o. male in NAD.  Vitals:  Per nursing notes.  EYES: PERRLA. Extraocular movements intact. Normal conjunctiva and lids.   ENT:  Hearing grossly normal.  Normal appearance to ears and nose.  Bilateral TM s, external canals, oropharynx normal. Normal lips, gums and teeth.  Normal nasal mucosa, septum and turbinates.  NECK:  Supple, thyroid not palpable.  CARDIAC:  Regular rate and rhythm without murmurs, rubs, or gallops. Legs without edema. Carotids without bruits.   LUNGS: Clear.  Respiratory effort normal.  SKIN:   Without rash, bruise, or palpable lesions.  NEURO:  Cranial nerves II-XII intact.     PSYCH:  Mood appropriate, memory intact.        DATA REVIEWED:  ADDITIONAL HISTORY SUMMARIZED (2): Reviewed [facility]' note 5/8/2017 regarding a cough.  DECISION TO OBTAIN EXTRA INFORMATION (1): None.   RADIOLOGY TESTS (1): None.  LABS (1): Reviewed and ordered labs.  MEDICINE TESTS (1): None.  INDEPENDENT REVIEW (2 each): None.     The visit lasted a total of 23 minutes face to face with the patient. Over 50% of the time was spent counseling and educating the patient about his elevated labs and insomnia.     Felicity BOYKIN, am scribing for and in the presence of Dr. Malcolm.  Ariela BOYKIN DO , personally performed  the services described in this documentation, as scribed by Felicity Timmons in my presence, and it is both accurate and complete.     MEDICATIONS:  Current Outpatient Prescriptions   Medication Sig Dispense Refill     albuterol (PROAIR HFA;PROVENTIL HFA;VENTOLIN HFA) 90 mcg/actuation inhaler Inhale 2 puffs every 4 (four) hours as needed for wheezing or shortness of breath. 1 Inhaler 0     aspirin 81 MG EC tablet Take 81 mg by mouth daily.       atenolol (TENORMIN) 25 MG tablet Take 1 tablet (25 mg total) by mouth daily. 90 tablet 3     buPROPion (WELLBUTRIN SR) 100 MG 12 hr tablet TAKE 1 TABLET BY MOUTH EVERY DAY 90 tablet 3     calcium carbonate-vitamin D3 (CALTRATE 600 PLUS D3) 600 mg(1,500mg) -400 unit per tablet Take 1 tablet by mouth daily.       desonide (DESOWEN) 0.05 % cream APPLY AN INCH OF CREAM TO THE AFFECTED AREA AS DIRECTED AS NEEDED 60 g 0     fluticasone (FLONASE) 50 mcg/actuation nasal spray INSTILL 2 SPRAYS IN EACH NOSTRIL EVERY DAY 16 g 3     omeprazole (PRILOSEC) 20 MG capsule TAKE 1 CAPSULE(20 MG) BY MOUTH DAILY BEFORE BREAKFAST 90 capsule 1     simvastatin (ZOCOR) 10 MG tablet TAKE ONE TABLET BY MOUTH EVERY NIGHT AT BEDTIME 90 tablet 3     traMADol (ULTRAM) 50 mg tablet Take 1-2 tablets by mouth at bedtime as needed for hip pain 30 tablet 1     traZODone (DESYREL) 50 MG tablet Take 1 tablet by mouth 1 hour before bedtime as needed for sleep 30 tablet 2     No current facility-administered medications for this visit.         Total data points: 3

## 2021-06-13 NOTE — TELEPHONE ENCOUNTER
Patient Returning Call  Reason for call:  Return call  Information relayed to patient:  Patient was informed of the message below.  Patient has additional questions:  Yes  If YES, what are your questions/concerns:  Patient stated he missed the call. Patient is questioning if there are any available times for today.  Okay to leave a detailed message?: Yes  [phone]

## 2021-06-13 NOTE — PROGRESS NOTES
ASSESSMENT & PLAN:  Brennan was seen today for positive screen for cancer, insomnia and mass.    Diagnoses and all orders for this visit:    Cancer screening  -     Ambulatory referral to Dermatology  -     PSA, Annual Screen (Prostatic-Specific Antigen)    Need for immunization against influenza  -     Influenza, Seasonal,Quad Inj, 36+ MOS    Insomnia  -     traZODone (DESYREL) 100 MG tablet; Take 1 tablet (100 mg total) by mouth at bedtime.    Multiple lipomas  -     Ambulatory referral to Dermatology    Hypertension    Anxiety    Prediabetes  -     Glycosylated Hemoglobin A1c    Lymphadenopathy  -     Mammo Diagnostic Bilateral; Future  -     US Breast Limited (Focal) Bilateral; Future  -     Cancel: HM1(CBC and Differential)  -     Morphology,Smear Review (MORP)  -     Cancel: HM1 (CBC with Diff)  -     Urinalysis-UC if Indicated  -     Comprehensive Metabolic Panel  -     Peripheral Blood Smear, Path Review  -     NM PET CT Whole Body; Future    Breast mass  -     Mammo Diagnostic Bilateral; Future  -     US Breast Limited (Focal) Bilateral; Future  -     NM PET CT Whole Body; Future    Angular cheilitis  -     Vitamin B12      -Patient presented today for follow-up of positive cancer screening done by dogs at work.  I am not sure of the specificity of this screening and whether there could be a fair number of false positives.  He does need to repeat the test.  Today on exam however he does seem to have generalized lymphadenopathy also in the breast region.  There is a nodule of the left pectoral region that I cannot tell if it is a lymph node versus a small mass.  It is slightly hard and fixed. Also palpable nodule on R.  Initially I did order mammogram and ultrasound to evaluate further however he does have generalized lymphadenopathy and we discussed maybe even considering PET CT scan to see if that focuses our area of concern.  I did order a CBC with differential and morphology to rule out a lymphoma.  Does  not have any other symptoms of infection.  No sudden weight changes.  PSA testing obtained today and I did place a referral for dermatology for total body skin check.  He does have multiple lipomas and I have resected several in the past however some of what he thinks are lipomas I cannot confirm if they are lipomas versus lymph nodes.  I suggested he sees dermatology for the number of lipomas he has if he wants to have them resected.  Colonoscopy from 2017  -Showed a polyp that needs follow-up in 5 years  -He continues to have issues with sleep and tends to be slightly anxious person and lies awake ruminating.  We did spend a fair amount of time discussing sleep hygiene and meditation and also recommended considering CBT counseling or such to help with sleep.  He has met his deductible for the year.  We did discuss going up on the trazodone to 100 mg at bedtime to see if that makes a difference from 50 mg.  For his anxiety we discussed trialing as needed hydroxyzine to see if it is helpful.  He did not tolerate the Wellbutrin as he had some adverse side effects with the medication.  Had issues with other SSRIs from sexual side effects.  We discussed angular chelitis symptoms.  Vitamin B12 will be assessed but I encouraged him to also talk with dermatology about this    Patient Instructions   Start taking trazodone 100 mg at nighttime instead of 50 mg. You can take hydroxyzine during the day or at bedtime for anxiety if needed.            Orders Placed This Encounter   Procedures     Mammo Diagnostic Bilateral     Standing Status:   Future     Standing Expiration Date:   10/12/2018     Order Specific Question:   Is tomosynthesis (3D) requested?     Answer:   No     Order Specific Question:   Reason for Exam (Describe Symptoms):     Answer:   axillary and breast lymphadenopathy , small mass < 0.5cm nodular lateral to left areola and also on R     Order Specific Question:   Can the procedure be changed per Radiologist  protocol?     Answer:   Yes     US Breast Limited (Focal) Bilateral     Standing Status:   Future     Standing Expiration Date:   10/12/2018     Order Specific Question:   Reason for Exam (Describe Symptoms):     Answer:   axillary and breast lymphadenopathy , small mass < 0.5cm nodular lateral to left areola and also on R     Order Specific Question:   Can the procedure be changed per Radiologist protocol?     Answer:   Yes     NM PET CT Whole Body     Standing Status:   Future     Standing Expiration Date:   10/15/2018     Order Specific Question:   Can the procedure be changed per Radiologist protocol?     Answer:   Yes     Order Specific Question:   If this is a diagnostic procedure, have the patient's age and recent imaging history been considered?     Answer:   Yes     Influenza, Seasonal,Quad Inj, 36+ MOS     PSA, Annual Screen (Prostatic-Specific Antigen)     Glycosylated Hemoglobin A1c     Morphology,Smear Review (MORP)     Vitamin B12     Urinalysis-UC if Indicated     Comprehensive Metabolic Panel     Ambulatory referral to Dermatology     Referral Priority:   Routine     Referral Type:   Consultation     Referral Reason:   Evaluation and Treatment     Requested Specialty:   Dermatology     Number of Visits Requested:   1     Medications Discontinued During This Encounter   Medication Reason     traMADol (ULTRAM) 50 mg tablet Therapy completed     buPROPion (WELLBUTRIN SR) 100 MG 12 hr tablet Therapy completed     simvastatin (ZOCOR) 10 MG tablet Therapy completed     traZODone (DESYREL) 50 MG tablet Reorder       No Follow-up on file.     CHIEF COMPLAINT:  Chief Complaint   Patient presents with     Positive screen for cancer     Had some screening done with a dog that smells cancer.  The dog signaled positive.       Insomnia     Having trouble sleeping.      Mass     Follow up on lipomas        HISTORY OF PRESENT ILLNESS:  Brennan is a 52 y.o. male presenting to the clinic today to discuss his positive  cancer screen, insomnia, and lipomas.     Positive Cancer Screen: He breathed into a mask for 10 minutes and put that in a foil packet, and he sent that in for the dog to sniff to detect cancer. He had a polyp removed in June 2017, and he thinks he sent in this test before that happened. Non-melanoma skin cancer is the most common one they detect, but they could also detect melanoma or a colon polyp. He is urinating at least twice per night. Before he had surgery, he started drinking a lot of water which was new for him.     Left Total Hip Replacement August 2017: His surgery went very well and he is feeling much better. He called an ambulance 4 days after his surgery because he felt like he might pass out; he had an EKG done by the paramedics, and they said he probably wouldn't need to go to the ED. He thinks he was feeling off from taking oxycodone, not eating that morning, and his anxiety made him think of the worst case scenario. He has had some weight loss since surgery which he thinks is from doing PT exercises.     Anxiety/Insomnia: He has not been taking bupropion because of the way it makes him feel; it makes him feel more down and jittery. It was working for him in June 2017 and then it was doing more harm than good. He took bupropion for at least 2-3 months. He would like a medication that helps him shut his brain off; he ruminates often. He trialed citalopram when he was seeing Dr. Lawler, and he thinks it was a low dose; he had some unwanted sexual side effects with citalopram. 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 He recently put a bid in to work for [employer], and after that he found himself thinking about it too much and wondering if it was the right thing to do. He wants to shut down the over-thinking. He uses the gym at [employer] at one of the fire stations, usually in the morning or at lunch time, and sometimes at night. The time of his exercise  does not seem to impact his sleep. He has been taking trazodone 50 mg on nights he doesn't work because it helps him sleep; he goes to bed around 11 pm and he sleeps until about 2:45 am when he wakes up, and from then he sleeps on and off until he gets up. He denies any known sexual side effects with trazodone. He is generally pretty happy, and life is good. His anxiety is only affecting him in that is causes some insomnia. Falling asleep is an issue for him, and now staying asleep is an issue. Trazodone makes him drowsy. He gets called at night working for the fire department, so he avoids taking trazodone when he knows he might have to work. He goes back to work on November 6, 2017 after taking 12 weeks off for recovery from his hip surgery. Sometimes, he takes ZzzQuil instead of trazodone which is helpful for a few hours of sleep like trazodone is. His wife has a fan running for white noise. He counts from 99 down, but at 97 he is already thinking of something else. His anxiety does not bother him in any other aspect except for it inhibits easy sleep. He feels like a pulse rate of 86 is too high for just sitting here today. He feels his heart is pounding sometimes in bed when he is thinking too much and anxious. He has tried melatonin in the past. He will try some sleep hygiene techniques on his own before considering therapy. He has been drinking about 1 soda during the day at lunch time and occasionally he drinks coffee. He notes it seemed like he slept well when he was taking hydroxyzine, oxycodone, and tramadol after his surgery.He does not think he has sleep apnea, but his wife tells him he snores in certain positions.     Hypertension: He is wondering if he can switch back to atenolol from metoprolol; the metoprolol pill is about 10 times bigger than the atenolol pill. He tries to take metoprolol 50 mg and aspirin 81 mg in the morning.     Multiple Lipomas: He has 2 lipomas on his low back, one on the  chest, and 2 on his right arm. He is willing to see dermatology to have his many lipomas removed. The ones on his left back are tender when palpated; they feel like they zing. He has had these lipomas for 10 years or so. His dad has had a lot of lipomas removed from his body. He has had three removed from his left chest already. He has felt small lumps around his breast area.     Chapped Lips: His lips are always chapped and they become red at the corners of his mouth. He uses Aquaphor on his lips at night. His lips have been more chapped than normal, and he is wondering if it is from medications he is taking. He does not think it is from dehydration because he drinks a lot of water. He takes a daily multivitamin.     REVIEW OF SYSTEMS:   He is still taking simvastatin 10 mg daily, omeprazole 20 mg daily, fluticasone nasal spray daily, and using desonide cream. He is no longer taking tramadol or oxycodone. He is active for the most part and he eats well. He denies any family history of diabetes. He denies any wheezing, cough, shortness of breath, hematochezia, change in bowel movements or caliber of stool, urinary hesitancy, stopping urination midstream, known exposure to asbestos, unintentional weight loss, or night sweats. All other systems are negative.     PFSH:    TOBACCO USE:  History   Smoking Status     Never Smoker   Smokeless Tobacco     Not on file        VITALS:  Vitals:    10/12/17 1043   BP: 114/80   Pulse: 86   Resp: 16   Temp: 98.2  F (36.8  C)   TempSrc: Oral   Weight: 174 lb (78.9 kg)     Wt Readings from Last 3 Encounters:   10/12/17 174 lb (78.9 kg)   07/12/17 177 lb 7 oz (80.5 kg)   06/05/17 176 lb (79.8 kg)     Body mass index is 23.6 kg/(m^2).    PHYSICAL EXAM:  GENERAL:  Reveals an alert 52 y.o. male in NAD.  Vitals:  Per nursing notes.  NECK:  Supple, thyroid not palpable.  CARDIAC:  Regular rate and rhythm without murmurs, rubs, or gallops. Legs without edema. Carotids without bruits.    LUNGS: Clear.  Respiratory effort normal.  ABDOMEN:  Soft, non-tender, without hepatosplenomegaly, masses, or hernias.   SKIN/LYMPH:   2 lipomas right forearm, 1 over the left iliac crest, 1 below left thoracic rib cage, 1 below right thoracic rib cage, some left axillary lymphadenopathy and a small pectoral mass <1 cm, bilateral breast with lymphadenopathy, bilateral soft inguinal lymph nodes, small hard mass next to the nipple on the right   PSYCH:  Mood appropriate, memory intact.     DATA REVIEWED:  ADDITIONAL HISTORY SUMMARIZED (2): Reviewed Jennifer Mullins's pre-op note 7/12/2017.  DECISION TO OBTAIN EXTRA INFORMATION (1): None.   RADIOLOGY TESTS (1): Ordered diagnostic mammogram and US.  LABS (1): Reviewed and ordered labs.  MEDICINE TESTS (1): None.  INDEPENDENT REVIEW (2 each): None.     The visit lasted a total of 40 minutes face to face with the patient. Over 50% of the time was spent counseling and educating the patient about cancer screening, insomnia, anxiety, and lipomas.     I, Felicity Timmons, am scribing for and in the presence of Dr. Malcolm.  IAriela DO , personally performed the services described in this documentation, as scribed by Felicity Timmons in my presence, and it is both accurate and complete.    This note has been dictated using voice recognition software. Any grammatical or context distortions are unintentional and inherent to the software.     MEDICATIONS:  Current Outpatient Prescriptions   Medication Sig Dispense Refill     albuterol (PROAIR HFA;PROVENTIL HFA;VENTOLIN HFA) 90 mcg/actuation inhaler Inhale 2 puffs every 4 (four) hours as needed for wheezing or shortness of breath. 1 Inhaler 0     aspirin 81 MG EC tablet Take 81 mg by mouth daily.       calcium carbonate-vitamin D3 (CALTRATE 600 PLUS D3) 600 mg(1,500mg) -400 unit per tablet Take 1 tablet by mouth daily.       desonide (DESOWEN) 0.05 % cream APPLY AN INCH OF CREAM TO THE AFFECTED AREA AS DIRECTED AS NEEDED 60 g 0      fluticasone (FLONASE) 50 mcg/actuation nasal spray SHAKE LIQUID AND USE 2 SPRAYS IN EACH NOSTRIL EVERY DAY 16 g 5     metoprolol succinate (TOPROL XL) 50 MG 24 hr tablet Take 1 tablet (50 mg total) by mouth daily. 90 tablet 1     omeprazole (PRILOSEC) 20 MG capsule TAKE 1 CAPSULE(20 MG) BY MOUTH DAILY BEFORE BREAKFAST 90 capsule 1     simvastatin (ZOCOR) 10 MG tablet TAKE 1 TABLET BY MOUTH EVERY NIGHT AT BEDTIME 90 tablet 2     traZODone (DESYREL) 100 MG tablet Take 1 tablet (100 mg total) by mouth at bedtime. 90 tablet 3     hydrOXYzine (VISTARIL) 25 MG capsule Take 1 capsule (25 mg total) by mouth 3 (three) times a day as needed for anxiety. 60 capsule 1     No current facility-administered medications for this visit.         Total data points: 4

## 2021-06-15 NOTE — TELEPHONE ENCOUNTER
Controlled Substance Refill Request  Medication Name:   Requested Prescriptions     Pending Prescriptions Disp Refills     zolpidem (AMBIEN) 10 mg tablet [Pharmacy Med Name: ZOLPIDEM 10MG TABLETS] 30 tablet 0     Sig: TAKE 1 TABLET(10 MG) BY MOUTH AT BEDTIME AS NEEDED FOR SLEEP     Date Last Fill: 11/27/20  [facility]ed [facility]: [facility]  Submit electronically to pharmacy  Controlled Substance Agreement on file:   Encounter-Level CSA Scan Date:    There are no encounter-level csa scan date.        Last office visit:  11/24/20  Magaly Brumfield RN, MA  [facility]    Triage Nurse Advisor

## 2021-06-16 PROBLEM — J30.2 SEASONAL ALLERGIES: Status: ACTIVE | Noted: 2017-06-05

## 2021-06-16 PROBLEM — R51.9 PRESSURE IN HEAD: Status: ACTIVE | Noted: 2019-04-16

## 2021-06-16 PROBLEM — K63.5 COLON POLYP: Status: ACTIVE | Noted: 2017-10-15

## 2021-06-16 PROBLEM — G47.00 INSOMNIA: Status: ACTIVE | Noted: 2017-06-05

## 2021-06-16 NOTE — TELEPHONE ENCOUNTER
Medication: Zolpidem    Last Date Filled 3/1/2021     pulled: NO    Only PCP Prescribing?: YES    Taken as prescribed from physician notes?: YES   5. Insomnia, unspecified type  Continue on zolpidem    CSA in last year: NO    Random Utox in last year: NO    Opioids + benzodiazepines? NO

## 2021-06-16 NOTE — TELEPHONE ENCOUNTER
Telephone Encounter by Catherine Humphrey at 4/19/2019  1:34 PM     Author: Catherine Humphrey Service: -- Author Type: --    Filed: 4/19/2019  1:37 PM Encounter Date: 4/19/2019 Status: Signed    : Catherine Humphrey APPROVED:    Approval start date: 3/19/19  Approval end date: 4/19/24    Pharmacy has been notified of approval and will contact patient when medication is ready for pickup.

## 2021-06-16 NOTE — TELEPHONE ENCOUNTER
*creating new encounter due to no access to note in [employer] since it was not routed to the writer    Incoming call from patient, following up on [employer] message to Dr. Malcolm. Patient stated he was told to get a stress test or stress echo done and to follow up with his PCP. Patient would like Dr. Malcolm to reach out to him to discuss the next steps. Please reach out to patient.

## 2021-06-16 NOTE — PATIENT INSTRUCTIONS - HE
Start sertraline for anxiety daily 1/2 tablet daily for a week and then if tolerating well then can increase to full tablet 25mg. Let me know if any sexual side effects.     -should refer for CBT counseling  - check with [facility] at [facility] or [facility].   Message me  Otherwise [facility]- moi phenow    Propranolol for as needed prior to going into stressful situation  10mg up to 3x daily for heart rate and anxiety

## 2021-06-16 NOTE — PROGRESS NOTES
ASSESSMENT and PLAN:  1. Hospital discharge follow-up/ Atypical chest pain  I reviewed emergency room visit and agree with the plan that he should have stress test so that order has been placed.  No acute findings in the emergency room.  Chest pain may be related to anxiety.  We also discussed that that could have explain the accelerated hypertension as well.  - NM Exercise Stress Test; Future    3. Anxiety  Patient is willing to start on an SSRI and we are going to start low and slow with sertraline.  Although he is already on a beta-blocker it would not hurt if he took an additional propranolol as needed for expectation of situations where he is going to have an increased heart rate.  We discussed little side effect with this medication.  For now let us hold off on switching his atenolol to metoprolol as I do not know that that will make much difference.  He does have Atarax from the hospital that he can also trial as needed for anxiety or panic attacks.  - sertraline (ZOLOFT) 25 MG tablet; Take 1 tablet (25 mg total) by mouth daily. For anxiety  Dispense: 30 tablet; Refill: 1  - propranoloL (INDERAL) 10 MG tablet; Take 1 tablet by mouth three times a day prn for anxiety  Dispense: 30 tablet; Refill: 1    4. Hypertension  Continue atenolol 25 mg for now.  Blood pressure is in great range.    5. Insomnia, unspecified type  Continue on zolpidem             Patient Instructions   Start sertraline for anxiety daily 1/2 tablet daily for a week and then if tolerating well then can increase to full tablet 25mg. Let me know if any sexual side effects.     -should refer for CBT counseling  - check with insurance at [facility] or [employer].   Message me  Otherwise [facility]- moi phenow    Propranolol for as needed prior to going into stressful situation  10mg up to 3x daily for heart rate and anxiety                 No orders of the defined types were placed in this encounter.    Medications  Discontinued During This Encounter   Medication Reason     traZODone (DESYREL) 50 MG tablet Alternate therapy     metroNIDAZOLE (METROGEL) 0.75 % gel Therapy completed     desonide (DESOWEN) 0.05 % cream Therapy completed       Return in about 4 weeks (around 4/16/2021) for follow up anxiety and chest pain .    DATA REVIEWED:  Additional History from Old Records Summarized (2): Reviewed records from [facility]  Decision to Obtain Records (1): Care everywhere opened  Radiology Tests Summarized or Ordered (1): Reviewed x-ray in the hospital  Labs Reviewed or Ordered (1): Labs reviewed including negative troponin  Medicine Test Summarized or Ordered (1):    Independent Review of EKG, X-RAY, or RAPID STREP (2 each): Normal EKG results reviewed.    Total time spent with patient in the visit, performing chart review, and documentation on the date of visit 40 minutes    CHIEF COMPLAINT:  Chief Complaint   Patient presents with     Hospital Visit Follow Up     Hosptial follow up for chest pain.  Heart was checked out and fine, still having some achiness.  Recommended follow up with PCP for blood pressure and stress test.      Anxiety     Mind gets stuck on stuff.  Difficult to separate work from home.        HISTORY OF PRESENT ILLNESS:  Brennan Vazquez is a 56 y.o. male presents today for follow-up of anxiety and chest pain.    Had a dull ache that never went away  In chest that prompted ER visit to [facility] (had a little Monday and Tuesday when home) -radiated into right shoulder. Felt like heart pounding. Has felt like that lately. Work was not more exertional than usual. Doesn't check very often blood presure typically. Started 180/100.  127/84 when left the emergency room.  Continues to feel that discomfort into his chest.  He was given Ativan in ER . Atarax prescribed for prn  At point could retire.    At times feels like sticking around until hip replaced and burn out sick days.   Cant shut mind off- debriefing  "from traumatic calls.  Like everyone gotten helpless. They have worked with Dr. Watson -work psychologist. Mostly talked with her about sleep. Without zolpidem cant sleep at work . 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   Usually 4-5 then back awake 20 days per year.  Thinks about overall health.   Thinks needs to talk to someone more.   Not sure how to deal with the anxiety.   Might have couple beers on day off.   Kids have  a little anxiety.   In the past Dr. Lawler trialed him on citalopram -sexual side effects so discontinued.  Wife is on sertraline.  Not taking claritin D daily.  On atenolol 25mg -but continues to feel heart pounding.  At one point when atenolol was off the market we switched him to metoprolol but I believe that we converted him back because he did not like the size of the tablet.  He does not mind switching back if that is what is recommended.  No ischemic sx but doesn't do a lot of cardio because of hip.  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  No premature heart disease in his family.       REVIEW OF SYSTEMS:    Comprehensive review of systems is negative except as noted in the HPI.     PFSH:  Tobacco use and medications reviewed below.     TOBACCO USE:  Social History     Tobacco Use   Smoking Status Never Smoker   Smokeless Tobacco Never Used       VITALS:  Vitals:    03/19/21 0914   BP: 112/78   Pulse: 73   Resp: 12   Temp: (!) 96  F (35.6  C)   TempSrc: Oral   Weight: 166 lb 6.4 oz (75.5 kg)   Height: 6' 0.25\" (1.835 m)     Wt Readings from Last 3 Encounters:   03/19/21 166 lb 6.4 oz (75.5 kg)   05/30/19 177 lb 6.4 oz (80.5 kg)   05/08/19 181 lb (82.1 kg)       PHYSICAL EXAM: Blood pressure very stable at this time.   Constitutional:  Reveals a 56 y.o. male in NAD.  He appears mildly anxious.  Vitals:  Per nursing notes.  Neck:  Supple, thyroid not palpable.  Cardiac:  Regular rate and rhythm without murmurs, rubs, or gallops. Carotids without bruits. Legs without " edema. Palpation of the radial pulse regular.  Lungs: Clear.  Respiratory effort normal.  Skin:   Without rash, bruise, or palpable lesions.  Rheumatologic: Normal joints and nails of the hands.  Neurologic:  Cranial nerves II-XII intact.     Psychiatric:  Mood appropriate, memory intact.         MEDICATIONS:  Current Outpatient Medications   Medication Sig Dispense Refill     alendronate (FOSAMAX) 70 MG tablet TAKE 1 TABLET BY MOUTH EVERY 7 DAYS IN THE AM ON AN EMPTY STOMACH WITH A FULL GLASS OF WATER 30 MINUTES BEFORE FOOD 12 tablet 4     aspirin 81 MG EC tablet Take 81 mg by mouth daily.       atenoloL (TENORMIN) 25 MG tablet Take 1 tablet (25 mg total) by mouth daily. 90 tablet 4     calcium carbonate-vitamin D3 (CALTRATE 600 PLUS D3) 600 mg(1,500mg) -400 unit per tablet Take 1 tablet by mouth daily.       fluticasone (FLONASE) 50 mcg/actuation nasal spray SHAKE LIQUID AND USE 2 SPRAYS IN EACH NOSTRIL EVERY DAY 48 g 3     loratadine-pseudoephedrine (CLARITIN-D 12 HOUR) 5-120 mg Tb12 Take 1 tablet by mouth 2 (two) times a day. Prn for allergy symptoms. (Patient taking differently: Take 1 tablet by mouth 2 (two) times a day as needed. Prn for allergy symptoms.) 60 tablet 0     omeprazole (PRILOSEC) 20 MG capsule TAKE 1 CAPSULE(20 MG) BY MOUTH DAILY BEFORE BREAKFAST 90 capsule 0     simvastatin (ZOCOR) 20 MG tablet Take 1 tablet (20 mg total) by mouth at bedtime. 90 tablet 4     zolpidem (AMBIEN) 10 mg tablet TAKE 1 TABLET(10 MG) BY MOUTH AT BEDTIME AS NEEDED FOR SLEEP 30 tablet 0     hydrOXYzine HCL (ATARAX) 25 MG tablet        propranoloL (INDERAL) 10 MG tablet Take 1 tablet by mouth three times a day prn for anxiety 30 tablet 1     sertraline (ZOLOFT) 25 MG tablet Take 1 tablet (25 mg total) by mouth daily. For anxiety 30 tablet 1     No current facility-administered medications for this visit.

## 2021-06-16 NOTE — TELEPHONE ENCOUNTER
Controlled Substance Refill Request  Medication Name:   Requested Prescriptions     Pending Prescriptions Disp Refills     zolpidem (AMBIEN) 10 mg tablet [Pharmacy Med Name: ZOLPIDEM 10MG TABLETS] 30 tablet 0     Sig: TAKE 1 TABLET(10 MG) BY MOUTH AT BEDTIME AS NEEDED FOR SLEEP     Date Last Fill: 3/1/21  [facility]  Submit electronically to pharmacy  Controlled Substance Agreement on file:   Encounter-Level CSA Scan Date:    There are no encounter-level csa scan date.        Last office visit:  3/19/21

## 2021-06-17 NOTE — TELEPHONE ENCOUNTER
Controlled Substance Refill Request  Medication Name:   Requested Prescriptions     Pending Prescriptions Disp Refills     zolpidem (AMBIEN) 10 mg tablet [Pharmacy Med Name: ZOLPIDEM 10MG TABLETS] 30 tablet 0     Sig: TAKE 1 TABLET(10 MG) BY MOUTH AT BEDTIME AS NEEDED FOR SLEEP     Signed Prescriptions Disp Refills     omeprazole (PRILOSEC) 20 MG capsule 90 capsule 3     Sig: TAKE 1 CAPSULE(20 MG) BY MOUTH DAILY BEFORE BREAKFAST     Authorizing Provider: GABRIEL LUA     Ordering User: POOL JUNG     Date Last Fill: 4/1/21  [facility]  Submit electronically to pharmacy  Controlled Substance Agreement on file:   Encounter-Level CSA Scan Date:    There are no encounter-level csa scan date.        Last office visit:  3/19/21

## 2021-06-17 NOTE — TELEPHONE ENCOUNTER
Telephone Encounter by Ivonne Bolivar MA at 8/14/2020  1:12 PM     Author: Ivonne Bolviar MA Service: -- Author Type: Certified Medical Assistant    Filed: 8/14/2020  1:18 PM Encounter Date: 8/13/2020 Status: Signed    : Ivonne Bolivar MA (Certified Medical Assistant)       Medication:   Requested Prescriptions     Pending Prescriptions Disp Refills   ? zolpidem (AMBIEN) 10 mg tablet [Pharmacy Med Name: ZOLPIDEM 10MG TABLETS] 30 tablet 0     Sig: TAKE 1 TABLET(10 MG) BY MOUTH AT BEDTIME AS NEEDED FOR SLEEP         Last Date Filled 6/15/2020     pulled: YES      Only PCP Prescribing?: YES    Taken as prescribed from physician notes?: YES  6. 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  I suggested trialing zolpidem instead.  We discussed possible adverse effects of the medication and sleepwalking.  He will let me know within the month if this is working well for him    CSA in last year: NO    Random Utox in last year: NO    Opioids + benzodiazepines? NO

## 2021-06-17 NOTE — PROGRESS NOTES
Assessment & Plan     Anxiety  -Patient continues to deal with significant anxiety.  Just starting to see some benefit with the paroxetine but it is low dose and also he is only 2 weeks then.  We had there suggested sticking with it for the next 2 weeks to see if some of this starts to improve since usually we recommend at least 4 weeks for steady state.  If not I would increase his dose to 20 mg and he will message me to let me know when and if he is ready for refill.  His anxiety is very much work induced and sometimes causing debilitating panic attacks where it would be difficult to function in his job.  We talked about potentially going out on early disability and he will let me know if he decides to do so.  In the meantime he is working with a therapist and doing CBT counseling and we discussed potential benefits of doing biofeedback and EMDR.  See below for panic attacks  - ALPRAZolam (XANAX) 0.25 MG tablet  Dispense: 30 tablet; Refill: 1    Panic attack  Discussed with patient would like him to trial of propranolol before going into work which she has not yet done.  I have reassured him so far that he is not doing damage to his body when he is having a panic attack.  We discussed that if he needs to he can take a Xanax and I called in a lower dose so he does not have to cut them in half.  - ALPRAZolam (XANAX) 0.25 MG tablet  Dispense: 30 tablet; Refill: 1    Environmental allergies  Refills provided  - fluticasone propionate (FLONASE) 50 mcg/actuation nasal spray  Dispense: 48 g; Refill: 3    Osteopenia  Patient will plan on getting bone density scan before longterm  - DXA Bone Density Scan    Hypertension  Blood pressure stable continue current dosing    Lipid screening  We discussed with patient if he would like to see what his cholesterol is off of his statin he could hold it for 3 months and come back with a fasting cholesterol level and then we could decide and rerisk stratify him if he needs to  continue on a medication such as this and the aspirin.  He does not have premature coronary disease in his family.  We also discussed could do a CT calcium score to further risk stratify  - Lipid Cascade FASTING    Sleep difficulties -continues to use zolpidem only when he is sleeping at home.  With 10 mg only gets 4 to 5 hours of sleep.  We are hoping that once he retires the sleep issues resolve themselves     eczema  Utilizing on his face with good response.  Not using daily  - desonide (DESOWEN) 0.05 % cream  Dispense: 60 g; Refill: 3    Patient instructions-  - try taking a propranolol before going into work to see if will help reduce anxiousness feelings.  Can repeat up to 3x daily.   - dont worry if you have to take a xanax 0.25mg     -could try holding simvastatin if you choose and come back for lab for lipid panel in 3 months.   - consider CT calcium score- if low risk maybe can  Discontinue aspirin and statin     -I would say to increase paroxetine to 20mg however if you feel like in 2 weeks anxiety is better stick with 10mg  -   -consider short term disability and I can write a letter        Patient plans to come back and see me for his preop    35 minutes spent on the date of the encounter doing chart review, history and exam, documentation and further activities per the note       Return in about 2 months (around 6/29/2021) for switch preop from Dr. Martinez to myself  .    rAiela Malcolm, [facility]    Subjective   Brennan Vazquez is 56 y.o. and presents today for the following health issues   HPI   Patient is here to follow-up on anxiety and panic attacks which have been working induced.  He is having a hard time still not being able to get control of his anxiety  Didn't tolerate fluoxetine -made him feel more revved up and caused more anxiety.  We changed him after approximately 2 weeks.  Cant shake anxious feeling. Been on paroxetine for 2 weeks now.   Pager goes  off and heart begins racing   Anxiousness doubles when at work.  Sometimes will hear a noise at home and jump as if it is his pager going off  A 1/2 xanax has helped if had to take.  Doesn't cause any grogginess- has 9 left.  Afraid to take them because does not want to waste them and not sure if I will give him refills  Feels like destroying body when having these episodes come on.  He has had complete cardiac work-up that was normal.  Has been doing other self relaxation strategies- meditation and yoga and teas .  Wife has been a huge support.. Avoiding any alcohol- makes heart beat harder.  Today he feels like his heart is pounding out of his chest however heart rate is normal and so his blood pressure.  Has had 3 therapy sessions for CBT with work.  So far it has been more of a getting to know visit.  Biofeedback was suggested as well as EMDR for some of his trauma and PTSD.  He is wondering my opinion on both.  He was given some names  Work is a trigger.  He is hoping that once he retires his anxiety is going to calm down.  Getting [employer] paperwork in order July 15 surgery for hip with Dr. Wheeler. - 3 months out on disability.   Vacation in June  -12 night- [facility] near [place].    assisted luming.   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   Feels like going to mess something up.  He is not sure if it is because he is so close to nursing home and with everything going on in today's world that something is going to happen where he makes a mistake and it compromises his nursing home or the rest of his life.  He is the oldest on the job.  They have been short staffed.  He feels like he is on million medications now but we reviewed his medication list.  Has not really tried propranolol before his work shift.   Omeprazole only as needed.   Mom and dad have high cholesterol   Feb 23 2nd covid moderna.  Symptoms seem to come after whether coincidence or not.    Given his impairing episodes of panic disorder  we discussed going out on disability early.   He seems to be tolerating the paroxetine and already is noticing some benefit but not immensely.  There is some sexual dysfunction with the medication as there has been with everything he has tried            Review of Systems     Complete ROS reviewed in HPI or otherwise negative        Objective    /80 (Patient Site: Left Arm, Patient Position: Sitting, Cuff Size: Adult Regular)   Pulse 73   Temp 98  F (36.7  C) (Oral)   Resp 16   Wt 166 lb 4 oz (75.4 kg)   BMI 22.39 kg/m    Body mass index is 22.39 kg/m .       Physical Exam  General impression no acute distress.  Heart regular rate and rhythm  Lungs clear to auscultation bilateral  Psych-anxious mood and affect.  Well-dressed well-groomed.  Clear linear thought  Little interest or pleasure in doing things: Several days  Feeling down, depressed, or hopeless: Not at all  Trouble falling or staying asleep, or sleeping too much: Not at all  Feeling tired or having little energy: Several days  Poor appetite or overeating: Not at all  Feeling bad about yourself - or that you are a failure or have let yourself or your family down: Several days  Trouble concentrating on things, such as reading the newspaper or watching television: Several days  Moving or speaking so slowly that other people could have noticed. Or the opposite - being so fidgety or restless that you have been moving around a lot more than usual: Not at all  Thoughts that you would be better off dead, or of hurting yourself in some way: Not at all  PHQ-9 Total Score: 4  If you checked off any problems, how difficult have these problems made it for you to do your work, take care of things at home, or get along with other people?: Not difficult at all  Total EBONY 7 Score     No data found in the last 1 encounters.        EBONY-7 Screening Results:  Feeling nervous, anxious or on edge: 3 (4/30/2021 12:00 PM)  Not being able to stop or control worry: 2  (2021 12:00 PM)  Worrying too much about different things: 2 (2021 12:00 PM)  Trouble relaxin (2021 12:00 PM)  Being so restless that is is hard to sit still: 1 (2021 12:00 PM)  Becoming easily annnoyed or irritable: 0 (2021 12:00 PM)  Feeling afraid as if something awful might happen: 1 (2021 12:00 PM)  EBONY-7 Total: 10 (2021 12:00 PM)  How difficult did these problems make it for you to do your work, take care of things at home or get along with other people? : Somewhat difficult (2021 12:00 PM)  How difficult did these problems make it for you to do your work, take care of things at home or get along with other people? : Somewhat difficult (2021 12:00 PM)

## 2021-06-17 NOTE — TELEPHONE ENCOUNTER
Refill Approved    Rx renewed per Medication Renewal Policy. Medication was last renewed on 11/27/20.    Shalom Calvillo, [facility] Triage/Med Refill 4/30/2021     Requested Prescriptions   Pending Prescriptions Disp Refills     zolpidem (AMBIEN) 10 mg tablet [Pharmacy Med Name: ZOLPIDEM 10MG TABLETS] 30 tablet 0     Sig: TAKE 1 TABLET(10 MG) BY MOUTH AT BEDTIME AS NEEDED FOR SLEEP       Controlled Substances Refill Protocol Failed - 4/29/2021  9:53 PM        Failed - Route all Controlled Substance Requests to Provider        Failed - Patient has controlled substance agreement in past 12 months     Encounter-Level CSA Scan Date:    There are no encounter-level csa scan date.               Passed - Visit with PCP or prescribing provider visit in past 12 months      Last office visit with prescriber/PCP: 3/19/2021 Ariela Malcolm DO OR same dept: 3/19/2021 Ariela Malcolm DO OR same specialty: 3/19/2021 Ariela Malcolm DO Last physical: Visit date not found Last MTM visit: Visit date not found    Next visit within 3 mo: Visit date not found  Next physical within 3 mo: Visit date not found  Prescriber OR PCP: Ariela Malcolm DO  Last diagnosis associated with med order: 1. Insomnia, unspecified type  - zolpidem (AMBIEN) 10 mg tablet [Pharmacy Med Name: ZOLPIDEM 10MG TABLETS]; TAKE 1 TABLET(10 MG) BY MOUTH AT BEDTIME AS NEEDED FOR SLEEP  Dispense: 30 tablet; Refill: 0    2. GERD (gastroesophageal reflux disease)  - omeprazole (PRILOSEC) 20 MG capsule [Pharmacy Med Name: OMEPRAZOLE 20MG CAPSULES]; TAKE 1 CAPSULE(20 MG) BY MOUTH DAILY BEFORE BREAKFAST  Dispense: 90 capsule; Refill: 0                  omeprazole (PRILOSEC) 20 MG capsule [Pharmacy Med Name: OMEPRAZOLE 20MG CAPSULES] 90 capsule 0     Sig: TAKE 1 CAPSULE(20 MG) BY MOUTH DAILY BEFORE BREAKFAST       GI Medications Refill Protocol Passed - 4/29/2021  9:53 PM        Passed - PCP or prescribing provider visit in last 12 or next 3 months.      Last office visit with prescriber/PCP: 3/19/2021 Ariela Malcolm DO OR same dept: 3/19/2021 Ariela Malcolm DO OR same specialty: 3/19/2021 Ariela Malcolm DO  Last physical: Visit date not found Last MTM visit: Visit date not found   Next visit within 3 mo: Visit date not found  Next physical within 3 mo: Visit date not found  Prescriber OR PCP: Ariela Malcolm DO  Last diagnosis associated with med order: 1. Insomnia, unspecified type  - zolpidem (AMBIEN) 10 mg tablet [Pharmacy Med Name: ZOLPIDEM 10MG TABLETS]; TAKE 1 TABLET(10 MG) BY MOUTH AT BEDTIME AS NEEDED FOR SLEEP  Dispense: 30 tablet; Refill: 0    2. GERD (gastroesophageal reflux disease)  - omeprazole (PRILOSEC) 20 MG capsule [Pharmacy Med Name: OMEPRAZOLE 20MG CAPSULES]; TAKE 1 CAPSULE(20 MG) BY MOUTH DAILY BEFORE BREAKFAST  Dispense: 90 capsule; Refill: 0    If protocol passes may refill for 12 months if within 3 months of last provider visit (or a total of 15 months).

## 2021-06-17 NOTE — TELEPHONE ENCOUNTER
RN cannot approve Refill Request    RN can NOT refill this medication   Patient request early refill. Medication last filled 6/15/20 for qty 90 refill 4. Provider to advise on request. . Last office visit: 4/30/2021 Ariela Malcolm DO Last Physical: Visit date not found Last MTM visit: Visit date not found Last visit same specialty: 4/30/2021 Ariela Malcolm DO.  Next visit within 3 mo: Visit date not found  Next physical within 3 mo: Visit date not found      Shalom Calvillo, [facility] Triage/Med Refill 5/14/2021    Requested Prescriptions   Pending Prescriptions Disp Refills     simvastatin (ZOCOR) 20 MG tablet [Pharmacy Med Name: SIMVASTATIN 20MG TABLETS] 90 tablet 4     Sig: TAKE 1 TABLET BY MOUTH AT BEDTIME       Statins Refill Protocol (Hmg CoA Reductase Inhibitors) Passed - 5/14/2021 11:26 AM        Passed - PCP or prescribing provider visit in past 12 months      Last office visit with prescriber/PCP: 4/30/2021 Ariela Malcolm DO OR same dept: 4/30/2021 Ariela Malcolm DO OR same specialty: 4/30/2021 Ariela Malcolm DO  Last physical: Visit date not found Last MTM visit: Visit date not found   Next visit within 3 mo: Visit date not found  Next physical within 3 mo: Visit date not found  Prescriber OR PCP: Ariela Malcolm DO  Last diagnosis associated with med order: 1. Hyperlipidemia  - simvastatin (ZOCOR) 20 MG tablet [Pharmacy Med Name: SIMVASTATIN 20MG TABLETS]; TAKE 1 TABLET BY MOUTH AT BEDTIME  Dispense: 90 tablet; Refill: 4    If protocol passes may refill for 12 months if within 3 months of last provider visit (or a total of 15 months).

## 2021-06-17 NOTE — PATIENT INSTRUCTIONS - HE
- try taking a propranolol before going into work to see if will help reduce anxiousness feelings.  Can repeat up to 3x daily.   - dont worry if you have to take a xanax 0.25mg     -could try holding simvastatin if you choose and come back for lab for lipid panel in 3 months.   - consider CT calcium score- if low risk maybe can  Discontinue aspirin and statin     -I would say to increase paroxetine to 20mg however if you feel like in 2 weeks anxiety is better stick with 10mg  -   -consider short term disability and I can write a letter

## 2021-06-18 NOTE — PATIENT INSTRUCTIONS - HE
Patient Instructions by Ariela Malcolm DO at 11/24/2020  5:57 PM     Author: Ariela Malcolm DO Service: -- Author Type: Physician    Filed: 11/24/2020  5:57 PM Encounter Date: 11/24/2020 Status: Signed    : Ariela Malcolm DO (Physician)         You may want to try a nasal lavage (also known as nasal irrigation). You can find over-the-counter products, such as Neti-Pot, at retail locations or make your own at home. Instructions for homemade nasal lavage and more information on the process are available online at https://www.[employer]/contents/image?imageKey=PI%7Q07044  and  https://www.[employer]/contents/rinsing-out-your-nose-with-salt-water-the-basics?zqrwuPij=9150&source=see_link    Sinusitis (Antibiotic Treatment)    The sinuses are air-filled spaces within the bones of the face. They connect to the inside of the nose. Sinusitis is an inflammation of the tissue that lines the sinuses. Sinusitis can occur during a cold. It can also happen due to allergies to pollens and other particles in the air. Sinusitis can cause symptoms of sinus congestion and a feeling of fullness. A sinus infection causes fever, headache, and facial pain. There is often green or yellow fluid draining from the nose or into the back of the throat (post-nasal drip). You have been given antibiotics to treat this condition.  Home care    Take the full course of antibiotics as instructed. Do not stop taking them, even when you feel better.    Drink plenty of water, hot tea, and other liquids. This may help thin nasal mucus. It also may help your sinuses drain fluids.    Heat may help soothe painful areas of your face. Use a towel soaked in hot water. Or,  the shower and direct the warm spray onto your face. Using a vaporizer along with a menthol rub at night may also help soothe symptoms.     An expectorant with guaifenesin may help thin nasal mucus and help your sinuses drain fluids.    You can use an  over-the-counter decongestant, unless a similar medicine was prescribed to you. Nasal sprays work the fastest. Use one that contains phenylephrine or oxymetazoline. First blow your nose gently. Then use the spray. Do not use these medicines more often than directed on the label. If you do, your symptoms may get worse. You may also take pills that contain pseudoephedrine. Dont use products that combine multiple medicines. This is because side effects may be increased. Read labels. You can also ask the pharmacist for help. (People with high blood pressure should not use decongestants. They can raise blood pressure.)    Over-the-counter antihistamines may help if allergies contributed to your sinusitis.      Do not use nasal rinses or irrigation during an acute sinus infection, unless your healthcare provider tells you to. Rinsing may spread the infection to other areas in your sinuses.    Use acetaminophen or ibuprofen to control pain, unless another pain medicine was prescribed to you. If you have chronic liver or kidney disease or ever had a stomach ulcer, talk with your healthcare provider before using these medicines. (Aspirin should never be taken by anyone under age 18 who is ill with a fever. It may cause severe liver damage.)    Don't smoke. This can make symptoms worse.  Follow-up care  Follow up with your healthcare provider or our staff if you are better in 1 week.  When to seek medical advice  Call your healthcare provider if any of these occur:    Facial pain or headache that gets worse    Stiff neck    Unusual drowsiness or confusion    Swelling of your forehead or eyelids    Vision problems, such as blurred or double vision    Fever of 100.4 F (38 C) or higher, or as directed by your healthcare provider    Seizure    Breathing problems    Symptoms don't go away in 10 days  Prevention  Here are steps you can take to help prevent an infection:    Keep good hand washing habits.    Dont have close contact  with people who have sore throats, colds, or other upper respiratory infections.    Dont smoke, and stay away from secondhand smoke.    Stay up to date with of your vaccines.  Date Last Reviewed: 11/1/2017 2000-2017 [employer]. [address] [place] [address], [place] [address]. All rights reserved. This information is not intended as a substitute for professional medical care. Always follow your healthcare professional's instructions.         Elvis thank you for doing this Evisit.  I believe that at this point your chronic congestion may be due to rebound congestion actually induced from the Afrin which is the oxymetazoline spray that you are using.  Although it works the best if you use it longer than 5 days it can cause rebound symptoms.  Typically for congestion I recommend doing saline rinses and the fluticasone nasal spray which it sounds like you are doing but you might have gotten wrapped up into doing the Afrin because it was more effective but unfortunately and might have caused the problem.  I would like you to stop using that immediately.  It is okay to use the Flonase if you want and to use over-the-counter ibuprofen and such for discomfort in the face and head.  Typically it is okay to use the Sudafed but I do not like it long-term.  Sudafed tends to work better than the Sudafed PE.  With as long as your symptoms have been lasting in case this is a bacterial sinus infection given the duration I am going to call in a prescription for you for Augmentin that you will take twice daily for 5 days.  It is an antibiotic.  You can continue with the fluticasone nasal spray.  If you continue to have issues after completion of the Augmentin it could be that you are still withdrawing from using the Afrin so I would give it at least a good couple weeks.  Please let me know if you have any other questions or concerns

## 2021-06-18 NOTE — PROGRESS NOTES
HISTORY OF PRESENT ILLNESS  Asked to bee by Dr. Malcolm for evaluation of nasal congestion according to the patient. Patient reports right side of nose is continuously plugged. Has pressure behind the eyes. Symptoms going on for about a year. No nasal discharge. No change in smell or taste. He does describe seasonal allergies. When sleeping he reports that he can't breathe through the nose when he is sleeping on his left side.    REVIEW OF SYSTEMS  Review of Systems: a 10-system review was performed. Pertinent positives are noted in the HPI and on a separate scanned document in the chart.    PMH, PSH, FH and SH has documented in the EHR.      EXAM    CONSTITUTIONAL  General Appearance:  Normal, well developed, well nourished, no obvious distress  Ability to Communicate:  communicates appropriately.    HEAD AND FACE  Appearance and Symmetry:  Normal, no scalp or facial scarring or suspicious lesions.  Paranasal sinuses tenderness:  Normal, Paranasal sinuses non tender    EARS  Clinical speech reception threshold:  Normal, able to hear normal speech.  Auricle:  Normal, Auricles without scars, lesions, masses.  External auditory canal:  Normal, External auditory canal normal.  Tympanic membrane:  Normal, Tympanic membranes normal without swelling or erythema.  Tympanic membrane mobility:  Normal, Normal tympanic membrane mobility.    NASAL ENDOSCOPY  The risks and benefits were reviewed with the patient.  The nose was sprayed with lidocaine and phenylephrine.  The following areas were examined:  floor, roof, middle and inferior turbinates, middle and inferior meatus, sphenoethmoidal recess, nasopharynx and septum.  The nasal scope passed without difficulty with the findings noted in the exam.    NOSE (speculum or scope)  Architecture:  Normal, Grossly normal external nasal architecture with no masses or lesions.  Mucosa:  Normal mucosa, No polyps or masses.  Septum:  Right septal deformity.  Turbinates:  Normal, No  turbinate abnormalities    ORAL CAVITY AND OROPHARYNX  Lips:  Normal.  Dental and gingiva:  Normal, No obvious dental or gingival disease.  Mucosa:  Normal, Moist mucous membranes.  Tongue:  Normal, Tongue mobile with no mucosal abnormalities  Hard and soft palate:  Normal, Hard and soft palate without cleft or mucosal lesions.  Oral pharynx:  Normal, Posterior pharynx without lesions or remarkable asymmetry.  Saliva:  Normal, Clear saliva.  Masses:  Normal, No palpable masses or pathologically enlarged lymph nodes.    NECK  Masses/lymph nodes:  Normal, No worrisome neck masses or lymph nodes.  Salivary glands:  Normal, Parotid and submandibular glands.  Trachea and larynx position:  Normal, Trachea and larynx midline.  Thyroid:  Normal, No thyroid abnormality.  Tenderness:  Normal, No cervical tenderness.  Suppleness:  Normal, Neck supple    NEUROLOGICAL  Speech pattern:  Normal, Proasaic    RESPIRATORY  Symmetry and Respiratory effort:  Normal, Symmetric chest movement and expansion with no increased intercostal retractions or use of accessory muscles.     IMPRESSION  Right septal deformity.    RECOMMENDATION  Discussed septoplasty. He is not interested in pursuing this solution at this time. He is going to think about and if he decides to proceed he will call to arrange.    Mikie Baeza MD
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
## 2021-06-21 NOTE — LETTER
"Letter by Ariela Malcolm DO at      Author: Ariela Malcolm DO Service: -- Author Type: --    Filed:  Encounter Date: 3/22/2021 Status: (Other)          4/1/2021        RE: Brennan Vazquez  YOB: 1965  Claim Number:  [employer] [id number].  Date of Claim: 3/17/2021 -ER visit [facility];   3/19/2021- hospital discharge follow up [facility];   3/22/2021- NM stress test         To Whom It May Concern:    Please accept this letter as  "[employer]s appeal on behalf of patient Brennan Vazquez with provider Ariela Malcolm DO  on date of service 3/19/2021 for office visit to follow up on ER work up for chest pain that occurred while at work.  It is our understanding based on your denial that this was denied due to: \"comments that chest pain could have developed while at home before arriving for work\".      Please consider the following to support payment as a Work Comp claim.        Mr Vazquez developed significant chest discomfort with radiation and severely elevated blood pressure while at work on 3/17/2021. This appropriately led to appropriate cardiac work up to rule out heart related causes. When I saw him on 3/19/2021 in follow up, I learned that Mr Vazquez has been suffering from increasing work related anxiety and trauma in response what he is experiencing and witnessing on the job as a . Unfortunately the Covid 19 pandemic has left households desperate and hopeless and the homes that Mr. Vazquez has gone into to aid these individuals has left a scarring effect. During our visit, mental health was a main focus of treatment and I prompted him to look into mental health referrals through his employer since he has been having increasing anxiety while at work that is causing his elevated blood pressure and chest discomfort. These feelings continue when he is at home in anticipation for the next work day.  It was also necessary and important to complete cardiac work up and ensure " there was no coronary blockage and stress test was ordered and completed on 3/22/2021 and found to be normal. Therefore we are continuing to work towards improving anxiety around the workplace through medication therapy and psychotherapy.     Based on this information, we are asking that you reconsider your previous decision and allow for coverage for the denied office visits and procedures since his work up and follow up has been induced by work events.  Should you require additional information, please do not hesitate to contact Dr. Ariela Malcolm DO  at phone number [phone].  If a decision is made to uphold the previous denial please direct us to or provide us with your insurance policy supporting the denial.  Fax - [phone]    Sincerely,  Ariela Malcolm DO

## 2021-06-21 NOTE — LETTER
"Letter by Ariela Malcolm DO at      Author: Ariela Malcolm DO Service: -- Author Type: --    Filed:  Encounter Date: 3/22/2021 Status: (Other)       4/1/2021        RE: Brennan Vazquez  YOB: 1965  Claim Number:  [employer] [id number].  Date of Claim: 3/17/2021 -ER visit [facility];   3/19/2021- hospital discharge follow up [facility];   3/22/2021- NM stress test         To Whom It May Concern:    Please accept this letter as  [employer] appeal on behalf of patient Brennan Vazquez with provider Ariela Malcolm DO  on date of service 3/19/2021 for office visit to follow up on ER work up for chest pain that occurred while at work.  It is our understanding based on your denial that this was denied due to: \"comments that chest pain could have developed while at home before arriving for work\".      Please consider the following to support payment as a Work Comp claim.        Mr Vazquez developed significant chest discomfort with radiation and severely elevated blood pressure while at work on 3/17/2021. This appropriately led to appropriate cardiac work up to rule out heart related causes. When I saw him on 3/19/2021 in follow up, I learned that Mr Vazquez has been suffering from increasing work related anxiety and trauma in response what he is experiencing and witnessing on the job as a . Unfortunately the Covid 19 pandemic has left households desperate and hopeless and the homes that Mr. Vazquez has gone into to aid these individuals has left a scarring effect. During our visit, mental health was a main focus of treatment and I prompted him to look into mental health referrals through his employer since he has been having increasing anxiety before going into work that is causing his elevated blood pressure and chest discomfort. It was also necessary and important to complete cardiac work up and ensure there was no coronary blockage and stress test was ordered and completed on " 3/22/2021 and found to be normal. Therefore we are continuing to work towards improving anxiety around the workplace through medication therapy and psychotherapy.     Based on this information, we are asking that you reconsider your previous decision and allow for coverage for the denied office visits and procedures since his work up and follow up has been induced by work events.  Should you require additional information, please do not hesitate to contact Dr. Ariela Malcolm DO  at phone number [phone].  If a decision is made to uphold the previous denial please direct us to or provide us with your insurance policy supporting the denial.  Fax - [phone]    Sincerely,  Ariela Malcolm DO

## 2021-06-24 NOTE — TELEPHONE ENCOUNTER
Please inform patient that I have reviewed the CT scan report attached.  It appears normal.  No further workup needs to be done at this point.  Please inform the Dr. Malcolm is away on maternity leave and he can follow-up at her return per previous schedule.    Jung Pickens MD  2/18/2019

## 2021-06-26 ENCOUNTER — HEALTH MAINTENANCE LETTER (OUTPATIENT)
Age: 56
End: 2021-06-26

## 2021-06-29 ENCOUNTER — RECORDS - HEALTHEAST (OUTPATIENT)
Dept: BONE DENSITY | Facility: CLINIC | Age: 56
End: 2021-06-29

## 2021-06-29 ENCOUNTER — COMMUNICATION - HEALTHEAST (OUTPATIENT)
Dept: FAMILY MEDICINE | Facility: CLINIC | Age: 56
End: 2021-06-29

## 2021-06-29 ENCOUNTER — RECORDS - HEALTHEAST (OUTPATIENT)
Dept: ADMINISTRATIVE | Facility: OTHER | Age: 56
End: 2021-06-29

## 2021-06-29 ENCOUNTER — OFFICE VISIT - HEALTHEAST (OUTPATIENT)
Dept: FAMILY MEDICINE | Facility: CLINIC | Age: 56
End: 2021-06-29

## 2021-06-29 DIAGNOSIS — M81.0 AGE-RELATED OSTEOPOROSIS WITHOUT CURRENT PATHOLOGICAL FRACTURE: ICD-10-CM

## 2021-06-29 DIAGNOSIS — E78.5 HYPERLIPIDEMIA LDL GOAL <100: ICD-10-CM

## 2021-06-29 DIAGNOSIS — G47.00 INSOMNIA, UNSPECIFIED TYPE: ICD-10-CM

## 2021-06-29 DIAGNOSIS — M94.9 DISORDER OF CARTILAGE, UNSPECIFIED: ICD-10-CM

## 2021-06-29 DIAGNOSIS — I10 ESSENTIAL HYPERTENSION: ICD-10-CM

## 2021-06-29 DIAGNOSIS — Z01.818 PREOP GENERAL PHYSICAL EXAM: ICD-10-CM

## 2021-06-29 DIAGNOSIS — M89.9 DISORDER OF BONE, UNSPECIFIED: ICD-10-CM

## 2021-06-29 DIAGNOSIS — M16.12 PRIMARY OSTEOARTHRITIS OF LEFT HIP: ICD-10-CM

## 2021-06-29 DIAGNOSIS — Z11.59 ENCOUNTER FOR HEPATITIS C SCREENING TEST FOR LOW RISK PATIENT: ICD-10-CM

## 2021-06-29 DIAGNOSIS — F41.1 ANXIETY STATE: ICD-10-CM

## 2021-06-29 DIAGNOSIS — N52.9 ERECTILE DYSFUNCTION, UNSPECIFIED ERECTILE DYSFUNCTION TYPE: ICD-10-CM

## 2021-06-29 LAB
ANION GAP SERPL CALCULATED.3IONS-SCNC: 13 MMOL/L (ref 5–18)
BUN SERPL-MCNC: 14 MG/DL (ref 8–22)
CALCIUM SERPL-MCNC: 9.1 MG/DL (ref 8.5–10.5)
CHLORIDE BLD-SCNC: 103 MMOL/L (ref 98–107)
CO2 SERPL-SCNC: 24 MMOL/L (ref 22–31)
CREAT SERPL-MCNC: 0.8 MG/DL (ref 0.7–1.3)
ERYTHROCYTE [DISTWIDTH] IN BLOOD BY AUTOMATED COUNT: 13.1 % (ref 11–14.5)
GFR SERPL CREATININE-BSD FRML MDRD: >60 ML/MIN/1.73M2
GLUCOSE BLD-MCNC: 99 MG/DL (ref 70–125)
HCT VFR BLD AUTO: 42.8 % (ref 40–54)
HGB BLD-MCNC: 14 G/DL (ref 14–18)
MCH RBC QN AUTO: 29.2 PG (ref 27–34)
MCHC RBC AUTO-ENTMCNC: 32.7 G/DL (ref 32–36)
MCV RBC AUTO: 89 FL (ref 80–100)
PLATELET # BLD AUTO: 178 THOU/UL (ref 140–440)
PMV BLD AUTO: 10.1 FL (ref 7–10)
POTASSIUM BLD-SCNC: 3.9 MMOL/L (ref 3.5–5)
RBC # BLD AUTO: 4.8 MILL/UL (ref 4.4–6.2)
SODIUM SERPL-SCNC: 140 MMOL/L (ref 136–145)
WBC: 5 THOU/UL (ref 4–11)

## 2021-06-29 RX ORDER — SILDENAFIL CITRATE 20 MG/1
TABLET ORAL
Qty: 30 TABLET | Refills: 11 | Status: SHIPPED | OUTPATIENT
Start: 2021-06-29 | End: 2022-01-02

## 2021-06-29 ASSESSMENT — ANXIETY QUESTIONNAIRES
IF YOU CHECKED OFF ANY PROBLEMS ON THIS QUESTIONNAIRE, HOW DIFFICULT HAVE THESE PROBLEMS MADE IT FOR YOU TO DO YOUR WORK, TAKE CARE OF THINGS AT HOME, OR GET ALONG WITH OTHER PEOPLE: SOMEWHAT DIFFICULT
6. BECOMING EASILY ANNOYED OR IRRITABLE: NOT AT ALL
4. TROUBLE RELAXING: NOT AT ALL
5. BEING SO RESTLESS THAT IT IS HARD TO SIT STILL: NOT AT ALL
2. NOT BEING ABLE TO STOP OR CONTROL WORRYING: SEVERAL DAYS
7. FEELING AFRAID AS IF SOMETHING AWFUL MIGHT HAPPEN: SEVERAL DAYS
3. WORRYING TOO MUCH ABOUT DIFFERENT THINGS: SEVERAL DAYS
GAD7 TOTAL SCORE: 4
1. FEELING NERVOUS, ANXIOUS, OR ON EDGE: SEVERAL DAYS

## 2021-06-29 ASSESSMENT — MIFFLIN-ST. JEOR: SCORE: 1646.84

## 2021-06-29 ASSESSMENT — PATIENT HEALTH QUESTIONNAIRE - PHQ9: SUM OF ALL RESPONSES TO PHQ QUESTIONS 1-9: 6

## 2021-06-30 LAB
25(OH)D3 SERPL-MCNC: 45.6 NG/ML (ref 30–80)
HCV AB SERPL QL IA: NEGATIVE

## 2021-06-30 NOTE — PROGRESS NOTES
Progress Notes by Ariela Malcolm DO at 11/24/2020  5:51 PM     Author: Ariela Malcolm DO Service: -- Author Type: Physician    Filed: 11/24/2020  5:57 PM Encounter Date: 11/24/2020 Status: Signed    : Ariela Malcolm DO (Physician)         Assessment:   The encounter diagnosis was Acute sinusitis with symptoms greater than 10 days.     Plan:     Medications Ordered   Medications   ? amoxicillin-clavulanate (AUGMENTIN) 875-125 mg per tablet     Sig: Take 1 tablet by mouth 2 (two) times a day for 5 days.     Dispense:  10 tablet     Refill:  0     Patient Instructions     You may want to try a nasal lavage (also known as nasal irrigation). You can find over-the-counter products, such as Neti-Pot, at retail locations or make your own at home. Instructions for homemade nasal lavage and more information on the process are available online at https://www.[employer]/contents/image?imageKey=PI%4I65517  and  https://www.[employer]/contents/rinsing-out-your-nose-with-salt-water-the-basics?hqhdoCzz=8439&source=see_link    Sinusitis (Antibiotic Treatment)    The sinuses are air-filled spaces within the bones of the face. They connect to the inside of the nose. Sinusitis is an inflammation of the tissue that lines the sinuses. Sinusitis can occur during a cold. It can also happen due to allergies to pollens and other particles in the air. Sinusitis can cause symptoms of sinus congestion and a feeling of fullness. A sinus infection causes fever, headache, and facial pain. There is often green or yellow fluid draining from the nose or into the back of the throat (post-nasal drip). You have been given antibiotics to treat this condition.  Home care    Take the full course of antibiotics as instructed. Do not stop taking them, even when you feel better.    Drink plenty of water, hot tea, and other liquids. This may help thin nasal mucus. It also may help your sinuses drain fluids.    Heat may help soothe  painful areas of your face. Use a towel soaked in hot water. Or,  the shower and direct the warm spray onto your face. Using a vaporizer along with a menthol rub at night may also help soothe symptoms.     An expectorant with guaifenesin may help thin nasal mucus and help your sinuses drain fluids.    You can use an over-the-counter decongestant, unless a similar medicine was prescribed to you. Nasal sprays work the fastest. Use one that contains phenylephrine or oxymetazoline. First blow your nose gently. Then use the spray. Do not use these medicines more often than directed on the label. If you do, your symptoms may get worse. You may also take pills that contain pseudoephedrine. Dont use products that combine multiple medicines. This is because side effects may be increased. Read labels. You can also ask the pharmacist for help. (People with high blood pressure should not use decongestants. They can raise blood pressure.)    Over-the-counter antihistamines may help if allergies contributed to your sinusitis.      Do not use nasal rinses or irrigation during an acute sinus infection, unless your healthcare provider tells you to. Rinsing may spread the infection to other areas in your sinuses.    Use acetaminophen or ibuprofen to control pain, unless another pain medicine was prescribed to you. If you have chronic liver or kidney disease or ever had a stomach ulcer, talk with your healthcare provider before using these medicines. (Aspirin should never be taken by anyone under age 18 who is ill with a fever. It may cause severe liver damage.)    Don't smoke. This can make symptoms worse.  Follow-up care  Follow up with your healthcare provider or our staff if you are better in 1 week.  When to seek medical advice  Call your healthcare provider if any of these occur:    Facial pain or headache that gets worse    Stiff neck    Unusual drowsiness or confusion    Swelling of your forehead or eyelids    Vision  problems, such as blurred or double vision    Fever of 100.4 F (38 C) or higher, or as directed by your healthcare provider    Seizure    Breathing problems    Symptoms don't go away in 10 days  Prevention  Here are steps you can take to help prevent an infection:    Keep good hand washing habits.    Dont have close contact with people who have sore throats, colds, or other upper respiratory infections.    Dont smoke, and stay away from secondhand smoke.    Stay up to date with of your vaccines.  Date Last Reviewed: 11/1/2017 2000-2017 [employer]. [address] [place], [place] [id number]. All rights reserved. This information is not intended as a substitute for professional medical care. Always follow your healthcare professional's instructions.         Elvis thank you for doing this Evisit.  I believe that at this point your chronic congestion may be due to rebound congestion actually induced from the Afrin which is the oxymetazoline spray that you are using.  Although it works the best if you use it longer than 5 days it can cause rebound symptoms.  Typically for congestion I recommend doing saline rinses and the fluticasone nasal spray which it sounds like you are doing but you might have gotten wrapped up into doing the Afrin because it was more effective but unfortunately and might have caused the problem.  I would like you to stop using that immediately.  It is okay to use the Flonase if you want and to use over-the-counter ibuprofen and such for discomfort in the face and head.  Typically it is okay to use the Sudafed but I do not like it long-term.  Sudafed tends to work better than the Sudafed PE.  With as long as your symptoms have been lasting in case this is a bacterial sinus infection given the duration I am going to call in a prescription for you for Augmentin that you will take twice daily for 5 days.  It is an antibiotic.  You can continue with the fluticasone nasal spray.  If you  continue to have issues after completion of the Augmentin it could be that you are still withdrawing from using the Afrin so I would give it at least a good couple weeks.  Please let me know if you have any other questions or concerns    Return for further follow up if needed. Call [phone]) or schedule an appointment via [employer]..    Subjective:   Brennan Vazquez is a 55 y.o. male who submitted an eVisit request for evaluation of his Sinus Problem.  See the questionnaire and message section of encounter report for information related to history of present illness and review of systems.    The following portions of the patient's history were reviewed and updated as appropriate:  He does not have any pertinent problems on file.  He has No Known Allergies..     Objective:   No exam performed today, patient submitted as eVisit.  Provider E-Visit time total (minutes): 10

## 2021-07-01 ENCOUNTER — COMMUNICATION - HEALTHEAST (OUTPATIENT)
Dept: FAMILY MEDICINE | Facility: CLINIC | Age: 56
End: 2021-07-01

## 2021-07-01 DIAGNOSIS — I10 ESSENTIAL HYPERTENSION: ICD-10-CM

## 2021-07-01 RX ORDER — ATENOLOL 25 MG/1
TABLET ORAL
Qty: 90 TABLET | Refills: 3 | Status: SHIPPED | OUTPATIENT
Start: 2021-07-01 | End: 2022-04-11

## 2021-07-04 NOTE — TELEPHONE ENCOUNTER
Telephone Encounter by Karla Junior at 6/30/2021 11:08 AM     Author: Karla Junior Service: -- Author Type: --    Filed: 6/30/2021 11:09 AM Encounter Date: 6/29/2021 Status: Signed    : Karla Junior       PRIOR AUTHORIZATION DENIED    Denial Rational: medications used to treat sexual dysfunction are excluded from benefit          Appeal Information: exclusion.  Patient may still get medication but will be responsible for cost.

## 2021-07-04 NOTE — PROGRESS NOTES
[facility]  [address] [facility] [address]  Dept: [phone]  Dept Fax: [phone]  Primary Provider: Gabriel Lua,   Pre-op Performing Provider: GABRIEL LUA       PREOPERATIVE EVALUATION:  Today's date: 6/29/2021    Brennan Vazquez is a 56 y.o. male who presents for a preoperative evaluation.    Surgical Information:  Surgery/Procedure: Right hip replacement  Surgery Location: [facility]  Surgeon: Dr. Wheeler   Surgery Date: 07/15/2021  Time of Surgery: TBD  Where patient plans to recover: At home with family  Fax number for surgical facility: [facility]opedics fax [phone]    Type of Anesthesia Anticipated: General    Assessment & Plan      The proposed surgical procedure is considered INTERMEDIATE risk.    Preop general physical exam/Primary osteoarthritis of left hip  Patient is cleared for surgery without any further diagnostic work-up. He has had a very complete cardiac work-up this past year that was normal and earlier symptoms this year were deemed related to anxiety which has greatly improved. He has had hip surgery in the past without complication and he knows expectations. No problems with anesthesia no recent signs of infection. Plans to do COVID-19 testing the day of surgery at location of his surgery. We discussed medications he has to discontinue prior to surgery and once he can take on the day of surgery. See below for instructions  - HM2(CBC w/o Differential)  - Basic Metabolic Panel    Anxiety   -Anxiety has been greatly controlled since starting on paroxetine 20 mg daily and is considering not retiring this summer as previously planned. We did discuss that if he doesn't wants to trial a wean off of the paroxetine in the past that he would go down 10 mg daily and if doing well then we would discontinue. He has alprazolam on file in the event that he needs to for acute panic  episodes or prior to work or on flights. I believe he should have enough while I am out on leave but if he needs a refill it should be okay to continue refilling this medication for him. Propranolol had also been called in as an option in the past for as needed panic attacks    Hypertension  Blood pressure well controlled on atenolol 25 mg once daily continue current dosing    Erectile dysfunction, unspecified erectile dysfunction type  Having a little sexual side effect from the paroxetine so we are going to try sildenafil. Prescription called in for the patient  - sildenafil (REVATIO) 20 mg tablet  Dispense: 30 tablet; Refill: 11    Encounter for hepatitis C screening test for low risk patient     - Hepatitis C Antibody (Anti-HCV)    Gastroesophageal reflux -only taking omeprazole as needed     Osteoporosis -continue on weekly Fosamax. Has bone density scan scheduled for today to reevaluate. Continue vitamin D supplementation     Hyperlipidemia -patient had decided to stay on simvastatin. 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 We discussed not utilizing zolpidem in the middle of the night if needed and to instead utilize his Xanax since shorter acting if absolutely needed.     Patient instructions--increase your fluids   -consider magnesium glycinate 200-400mg at bedtime for leg cramps and restless legs.  Stretch before bedtime     -hold your aspirin and ibuprofen and multivitamin-5 days prior. Ok tylenol          Risks and Recommendations:  The patient has the following additional risks and recommendations for perioperative complications:   - No identified additional risk factors other than previously addressed    Medication Instructions:  Patient is to take all scheduled medications on the day of surgery EXCEPT for modifications listed below:  Aspirin, ibuprofen and vitamins 5 days prior should hold    RECOMMENDATION:  APPROVAL GIVEN to proceed with  proposed procedure, without further diagnostic evaluation.        43 minutes spent on the date of the encounter doing chart review, history and exam, documentation and further activities per the note         Subjective     HPI related to upcoming procedure:     Patient is here today for preoperative clearance for hip replacement of his left hip. Has severe osteoarthritis and has been working with Dr. Cooper replaced his right hip a few years back without complication. He is concerned if he needs to retire because he will be working with two artificial hips but indicates he thinks Ortho will be clearing him.  Doing a lot better from anxiety standpoint now that we adjusted meds.  Likes captain working with currently. Has to give PREA 60 day notice it does plan to retire but now is thinking of going until the end of the year or seeing how it goes.. Doing much better on the paroxetine 20 mg without side effects except for some problems with erectile dysfunction. Libido has been stable. He is interested in using sildenafil to see if that helps.  Will take 3 months with hip recovery.    Weight back up - had lost 11 lb with stress. Rarely getting panic attacks.  Only uses xanax couple times per week before shift. He was started on the recent flight as well.  Rarely having racing heart palpitations- felt like was beating hard at family gathering. It was hot that day and could have been dehydration. Had a couple of alcoholic beverages and had mountain dew which rarely drinks..   Ibuprofen 3-4 days per week for hips or headache.   For sleep zolpidem is working effectively -falling asleep ok within 30 min of taking zolpidem 10mg and then  Up in 4 hours to use bathroom . Sometimes takes 1/2 tab extra zolpidem - can take less to fall back asleep. Does have some restless leg symptoms sometimes before going to bed and admits maybe doesn't drink enough water.  -Has history of osteoporosis and is on Fosamax. Has bone density scan  scheduled for today.  Only takes omeprazole as needed for acid reflux.-      Has had no recent fevers or chills no chest pain symptoms no ischemic symptoms      Preop Questions 6/26/2021   Have you ever had a heart attack or stroke? No   Have you ever had surgery on your heart or blood vessels, such as a stent placement, a coronary artery bypass, or surgery on an artery in your head, neck, heart, or legs? No   Do you have chest pain with activity? No   Do you have a history of  heart failure? No   Do you currently have a cold, bronchitis or symptoms of other infection? No   Do you have a cough, shortness of breath, or wheezing? No   Do you or anyone in your family have previous history of blood clots? No   Do you or does anyone in your family have a serious bleeding problem such as prolonged bleeding following surgeries or cuts? No   Have you ever had problems with anemia or been told to take iron pills? No   Have you had any abnormal blood loss such as black, tarry or bloody stools? No   Have you ever had a blood transfusion? No   Are you willing to have a blood transfusion if it is medically needed before, during, or after your surgery? Yes   Have you or any of your relatives ever had problems with anesthesia? No   Do you have sleep apnea, excessive snoring or daytime drowsiness? No   Do you have any artifical heart valves or other implanted medical devices like a pacemaker, defibrillator, or continuous glucose monitor? No   Do you have artificial joints? YES -  Left hip    Are you allergic to latex? No     Health Care Directive:  Patient does not have a Health Care Directive or Living Will: Discussed advance care planning with patient; information given to patient to review.    Preoperative Review of :    reviewed - controlled substances reflected in medication list.     See problem list for active medical problems.  Problems all longstanding and stable, except as noted/documented.  See ROS for pertinent  symptoms related to these conditions.      Review of Systems     Complete ROS reviewed in HPI or otherwise negative      Patient Active Problem List    Diagnosis Date Noted     Primary osteoarthritis of left hip 06/29/2021     Colon polyp 2017 10/15/2017     Seasonal allergies 06/05/2017     Insomnia 06/05/2017     Multiple lipomas 11/17/2016     Age-related osteoporosis without current pathological fracture      Hyperlipidemia LDL goal <100      Rosacea      Anxiety      Restless Legs Syndrome      Hypertension      Past Medical History:   Diagnosis Date     Anxiety      HLD (hyperlipidemia)      HTN (hypertension)      Osteoarthritis of left hip 6/5/2017     Osteopenia      Past Surgical History:   Procedure Laterality Date     TOTAL HIP ARTHROPLASTY Left 08/2017    [facility]      Current Outpatient Medications   Medication Sig Dispense Refill     alendronate (FOSAMAX) 70 MG tablet TAKE 1 TABLET BY MOUTH EVERY 7 DAYS IN THE AM ON AN EMPTY STOMACH WITH A FULL GLASS OF WATER 30 MINUTES BEFORE FOOD 12 tablet 4     ALPRAZolam (XANAX) 0.25 MG tablet Take 1 tablet (0.25 mg total) by mouth daily as needed for anxiety. 30 tablet 1     aspirin 81 MG EC tablet Take 81 mg by mouth daily.       calcium carbonate-vitamin D3 (CALTRATE 600 PLUS D3) 600 mg(1,500mg) -400 unit per tablet Take 1 tablet by mouth daily.       desonide (DESOWEN) 0.05 % cream APPLY AN INCH OF CREAM TO THE AFFECTED AREA of face AS DIRECTED AS NEEDED 60 g 3     fluticasone propionate (FLONASE) 50 mcg/actuation nasal spray SHAKE LIQUID AND USE 2 SPRAYS IN EACH NOSTRIL EVERY DAY 48 g 3     multivitamin therapeutic tablet Take 1 tablet by mouth daily.       omeprazole (PRILOSEC) 20 MG capsule TAKE 1 CAPSULE(20 MG) BY MOUTH DAILY BEFORE BREAKFAST 90 capsule 3     PARoxetine (PAXIL) 20 MG tablet Take 1 tablet (20 mg total) by mouth daily. For anxiety 90 tablet 3     propranoloL (INDERAL) 10 MG tablet Take 1 tablet by mouth three times a day prn for anxiety  30 tablet 1     simvastatin (ZOCOR) 20 MG tablet TAKE 1 TABLET BY MOUTH AT BEDTIME 90 tablet 4     zolpidem (AMBIEN) 10 mg tablet Take 1 tablet (10 mg total) by mouth at bedtime as needed for sleep. 30 tablet 4     atenoloL (TENORMIN) 25 MG tablet TAKE 1 TABLET(25 MG) BY MOUTH DAILY 90 tablet 3     sildenafil (REVATIO) 20 mg tablet Take 1 tablet by mouth 1 hour prior to sexual intercourse 30 tablet 11     No current facility-administered medications for this visit.        No Known Allergies    Social History     Tobacco Use     Smoking status: Never Smoker     Smokeless tobacco: Never Used   Substance Use Topics     Alcohol use: Yes      Family History   Problem Relation Age of Onset     Hypertension Mother      Hyperlipidemia Mother      Hypertension Maternal Aunt      Hypertension Maternal Uncle      Hypertension Maternal Grandmother      Breast cancer Maternal Grandmother      Hypertension Maternal Grandfather      Social History     Substance and Sexual Activity   Drug Use No        Objective     /78   Pulse 62   Temp 96.7  F (35.9  C) (Oral)   Resp 12   Ht 6' (1.829 m)   Wt 170 lb 9.6 oz (77.4 kg)   SpO2 97%   BMI 23.14 kg/m    Physical Exam    GENERAL APPEARANCE: healthy, alert and no distress     EYES: EOMI,  PERRL     HENT: ear canals and TM's normal and nose and mouth without ulcers or lesions     NECK: no adenopathy, no asymmetry, masses, or scars and thyroid normal to palpation     RESP: lungs clear to auscultation - no rales, rhonchi or wheezes     CV: regular rates and rhythm, normal S1 S2, no S3 or S4 and no murmur, click or rub     ABDOMEN:  soft, nontender, no HSM or masses and bowel sounds normal     MS: extremities normal- no gross deformities noted, no evidence of inflammation in joints, FROM in all extremities.     SKIN: no suspicious lesions or rashes     NEURO: Normal strength and tone, sensory exam grossly normal, mentation intact and speech normal     PSYCH: mentation appears  normal. and affect normal/bright     LYMPHATICS: No cervical adenopathy    Recent Labs   Lab Test 06/29/21  1014 03/08/21   HGB 14.0  --      --      --    K 3.9  --    CREATININE 0.80 0.94        PRE-OP Diagnostics:   Recent Results (from the past 240 hour(s))   HM2(CBC w/o Differential)    Collection Time: 06/29/21 10:14 AM   Result Value Ref Range    WBC 5.0 4.0 - 11.0 thou/uL    RBC 4.80 4.40 - 6.20 mill/uL    Hemoglobin 14.0 14.0 - 18.0 g/dL    Hematocrit 42.8 40.0 - 54.0 %    MCV 89 80 - 100 fL    MCH 29.2 27.0 - 34.0 pg    MCHC 32.7 32.0 - 36.0 g/dL    RDW 13.1 11.0 - 14.5 %    Platelets 178 140 - 440 thou/uL    MPV 10.1 (H) 7.0 - 10.0 fL   Basic Metabolic Panel    Collection Time: 06/29/21 10:14 AM   Result Value Ref Range    Sodium 140 136 - 145 mmol/L    Potassium 3.9 3.5 - 5.0 mmol/L    Chloride 103 98 - 107 mmol/L    CO2 24 22 - 31 mmol/L    Anion Gap, Calculation 13 5 - 18 mmol/L    Glucose 99 70 - 125 mg/dL    Calcium 9.1 8.5 - 10.5 mg/dL    BUN 14 8 - 22 mg/dL    Creatinine 0.80 0.70 - 1.30 mg/dL    GFR MDRD Af Amer >60 >60 mL/min/1.73m2    GFR MDRD Non Af Amer >60 >60 mL/min/1.73m2   Hepatitis C Antibody (Anti-HCV)    Collection Time: 06/29/21 10:14 AM   Result Value Ref Range    Hepatitis C Ab Negative Negative   Vitamin D, Total (25-Hydroxy)    Collection Time: 06/29/21 10:14 AM   Result Value Ref Range    Vitamin D, Total (25-Hydroxy) 45.6 30.0 - 80.0 ng/mL          Stress test on 03/21/2021-   Result Narrative      The nuclear stress test is negative for inducible myocardial ischemia or infarction.     The left ventricular ejection fraction at stress is 65%.     The patient's exercise capacity is above average for age.     There is no prior study for comparison.     Wed Mar 17, 2021   1651 EKG is normal sinus rhythm without any ST elevation or depression, normal intervals.       REVISED CARDIAC RISK INDEX (RCRI)   The patient has the following serious cardiovascular risks for  perioperative complications:   - No serious cardiac risks = 0 points    RCRI INTERPRETATION: 0 points: Class I (very low risk - 0.4% complication rate)         Signed Electronically by: Ariela Malcolm DO    Copy of this evaluation report is provided to requesting physician.

## 2021-07-04 NOTE — TELEPHONE ENCOUNTER
Telephone Encounter by Meaghan Garber CMA at 6/29/2021  3:08 PM     Author: Meaghan Garber CMA Service: -- Author Type: Certified Medical Assistant    Filed: 6/29/2021  3:10 PM Encounter Date: 6/29/2021 Status: Signed    : Meaghan Garber CMA (Certified Medical Assistant)       [facility]     Disp Refills Start End MANUEL    sildenafil (REVATIO) 20 mg tablet 30 tablet 11 6/29/2021  --   Sig: Take 1 tablet by mouth 1 hour prior to sexual intercourse   Sent to pharmacy as: sildenafiL (pulmonary hypertension) 20 mg tablet (REVATIO)     Message from pharmacy  Plan does not cover this medication.  Please call plan at [phone] to initiate prior authorization or call fax pharmacy to change medication.

## 2021-07-04 NOTE — TELEPHONE ENCOUNTER
Telephone Encounter by Ivonne Bolivar MA at 6/30/2021 11:27 AM     Author: Ivonne Bolivar MA Service: -- Author Type: Certified Medical Assistant    Filed: 6/30/2021 11:27 AM Encounter Date: 6/29/2021 Status: Signed    : Ivonne Bolivar MA (Certified Medical Assistant)       Did you talk to him about the goodrx at all for this med?

## 2021-07-04 NOTE — TELEPHONE ENCOUNTER
Telephone Encounter by Karla Junior at 6/29/2021  4:37 PM     Author: Karla Junior Service: -- Author Type: --    Filed: 6/29/2021  4:37 PM Encounter Date: 6/29/2021 Status: Signed    : Karla Junior       [place] PA team  [phone]  Pool:  PA MED ([id number])          PA has been initiated.       PA form completed and faxed insurance via Cover My Meds     Key:  [id number]     Medication:  SILDENAFIL 20MG    Insurance:  [employer]        Response will be received via fax and may take up to 5-10 business days depending on plan

## 2021-07-04 NOTE — TELEPHONE ENCOUNTER
Telephone Encounter by Shalom Calvillo, RN at 7/1/2021  2:33 PM     Author: Shalom Calvillo, RN Service: -- Author Type: Registered Nurse    Filed: 7/1/2021  2:34 PM Encounter Date: 7/1/2021 Status: Signed    : Shalom Calvillo, RN (Registered Nurse)       Refill Approved    Rx renewed per Medication Renewal Policy. Medication was last renewed on 6/15/20.    Shalom Calvillo, [facility] Connection Triage/Med Refill 7/1/2021     Requested Prescriptions   Pending Prescriptions Disp Refills   ? atenoloL (TENORMIN) 25 MG tablet [Pharmacy Med Name: ATENOLOL 25MG TABLETS] 90 tablet 4     Sig: TAKE 1 TABLET(25 MG) BY MOUTH DAILY       Beta-Blockers Refill Protocol Passed - 7/1/2021  2:30 PM        Passed - PCP or prescribing provider visit in past 12 months or next 3 months     Last office visit with prescriber/PCP: 4/30/2021 Ariela Malcolm DO OR same dept: 4/30/2021 Ariela Malcolm DO OR same specialty: 4/30/2021 Ariela Malcolm DO  Last physical: 6/29/2021 Last MTM visit: Visit date not found   Next visit within 3 mo: Visit date not found  Next physical within 3 mo: Visit date not found  Prescriber OR PCP: Ariela Malcolm DO  Last diagnosis associated with med order: 1. Hypertension  - atenoloL (TENORMIN) 25 MG tablet [Pharmacy Med Name: ATENOLOL 25MG TABLETS]; TAKE 1 TABLET(25 MG) BY MOUTH DAILY  Dispense: 90 tablet; Refill: 4    If protocol passes may refill for 12 months if within 3 months of last provider visit (or a total of 15 months).             Passed - Blood pressure filed in past 12 months     BP Readings from Last 1 Encounters:   06/29/21 111/78

## 2021-07-05 PROBLEM — M16.12 PRIMARY OSTEOARTHRITIS OF LEFT HIP: Status: ACTIVE | Noted: 2021-06-29

## 2021-07-05 PROBLEM — R51.9 PRESSURE IN HEAD: Status: RESOLVED | Noted: 2019-04-16 | Resolved: 2021-06-29

## 2021-07-06 VITALS
SYSTOLIC BLOOD PRESSURE: 111 MMHG | RESPIRATION RATE: 12 BRPM | TEMPERATURE: 96.7 F | DIASTOLIC BLOOD PRESSURE: 78 MMHG | BODY MASS INDEX: 23.11 KG/M2 | WEIGHT: 170.6 LBS | HEART RATE: 62 BPM | HEIGHT: 72 IN | OXYGEN SATURATION: 97 %

## 2021-07-06 ASSESSMENT — PATIENT HEALTH QUESTIONNAIRE - PHQ9: SUM OF ALL RESPONSES TO PHQ QUESTIONS 1-9: 6

## 2021-07-07 NOTE — PATIENT INSTRUCTIONS - HE
-increase your fluids   -consider magnesium glycinate 200-400mg at bedtime for leg cramps and restless legs.  Stretch before bedtime     -hold your aspirin and ibuprofen and multivitamin-5 days prior. Ok tylenol

## 2021-07-08 ASSESSMENT — ANXIETY QUESTIONNAIRES: GAD7 TOTAL SCORE: 4

## 2021-07-09 ENCOUNTER — COMMUNICATION - HEALTHEAST (OUTPATIENT)
Dept: FAMILY MEDICINE | Facility: CLINIC | Age: 56
End: 2021-07-09

## 2021-07-09 DIAGNOSIS — F41.1 ANXIETY STATE: ICD-10-CM

## 2021-07-09 RX ORDER — PROPRANOLOL HYDROCHLORIDE 10 MG/1
TABLET ORAL
Qty: 270 TABLET | Refills: 3 | Status: SHIPPED | OUTPATIENT
Start: 2021-07-09 | End: 2022-01-02

## 2021-07-09 NOTE — TELEPHONE ENCOUNTER
Telephone Encounter by Shalom Calvillo, RN at 7/9/2021  7:59 AM     Author: Shalom Calvillo, RN Service: -- Author Type: Registered Nurse    Filed: 7/9/2021  8:00 AM Encounter Date: 7/8/2021 Status: Signed    : Shalom Calvillo, RN (Registered Nurse)       Refill Approved    Rx renewed per Medication Renewal Policy. Medication was last renewed on 3/19/21.    Shalom Calvillo, [facility] Connection Triage/Med Refill 7/9/2021     Requested Prescriptions   Pending Prescriptions Disp Refills   ? propranoloL (INDERAL) 10 MG tablet [Pharmacy Med Name: PROPRANOLOL 10MG TABLETS] 30 tablet 1     Sig: TAKE 1 TABLET BY MOUTH THREE TIMES DAILY AS NEEDED FOR ANXIETY       Beta-Blockers Refill Protocol Passed - 7/8/2021  5:26 PM        Passed - PCP or prescribing provider visit in past 12 months or next 3 months     Last office visit with prescriber/PCP: 4/30/2021 Ariela Malcolm DO OR same dept: 4/30/2021 Ariela Malcolm DO OR same specialty: 4/30/2021 Ariela Malcolm DO  Last physical: 6/29/2021 Last MTM visit: Visit date not found   Next visit within 3 mo: Visit date not found  Next physical within 3 mo: Visit date not found  Prescriber OR PCP: Ariela Malcolm DO  Last diagnosis associated with med order: 1. Anxiety  - propranoloL (INDERAL) 10 MG tablet [Pharmacy Med Name: PROPRANOLOL 10MG TABLETS]; TAKE 1 TABLET BY MOUTH THREE TIMES DAILY AS NEEDED FOR ANXIETY  Dispense: 30 tablet; Refill: 1    If protocol passes may refill for 12 months if within 3 months of last provider visit (or a total of 15 months).             Passed - Blood pressure filed in past 12 months     BP Readings from Last 1 Encounters:   06/29/21 111/78

## 2021-07-15 ENCOUNTER — TRANSFERRED RECORDS (OUTPATIENT)
Dept: HEALTH INFORMATION MANAGEMENT | Facility: CLINIC | Age: 56
End: 2021-07-15
Payer: COMMERCIAL

## 2021-07-28 ENCOUNTER — MYC MEDICAL ADVICE (OUTPATIENT)
Dept: FAMILY MEDICINE | Facility: CLINIC | Age: 56
End: 2021-07-28

## 2021-07-28 ENCOUNTER — TRANSFERRED RECORDS (OUTPATIENT)
Dept: HEALTH INFORMATION MANAGEMENT | Facility: CLINIC | Age: 56
End: 2021-07-28

## 2021-08-03 DIAGNOSIS — M94.9 DISORDER OF BONE AND CARTILAGE: ICD-10-CM

## 2021-08-03 DIAGNOSIS — M89.9 DISORDER OF BONE AND CARTILAGE: ICD-10-CM

## 2021-08-05 RX ORDER — ALENDRONATE SODIUM 70 MG/1
TABLET ORAL
Qty: 12 TABLET | Refills: 4 | Status: SHIPPED | OUTPATIENT
Start: 2021-08-05 | End: 2022-08-06

## 2021-08-25 ENCOUNTER — TRANSFERRED RECORDS (OUTPATIENT)
Dept: HEALTH INFORMATION MANAGEMENT | Facility: CLINIC | Age: 56
End: 2021-08-25

## 2021-09-03 ENCOUNTER — OFFICE VISIT (OUTPATIENT)
Dept: FAMILY MEDICINE | Facility: CLINIC | Age: 56
End: 2021-09-03
Payer: COMMERCIAL

## 2021-09-03 VITALS
HEART RATE: 62 BPM | BODY MASS INDEX: 23.46 KG/M2 | SYSTOLIC BLOOD PRESSURE: 111 MMHG | WEIGHT: 173 LBS | TEMPERATURE: 96.9 F | DIASTOLIC BLOOD PRESSURE: 75 MMHG | RESPIRATION RATE: 16 BRPM

## 2021-09-03 DIAGNOSIS — R10.13 ABDOMINAL PAIN, EPIGASTRIC: Primary | ICD-10-CM

## 2021-09-03 LAB
ALBUMIN SERPL-MCNC: 4.3 G/DL (ref 3.5–5)
ALP SERPL-CCNC: 90 U/L (ref 45–120)
ALT SERPL W P-5'-P-CCNC: 16 U/L (ref 0–45)
AMYLASE SERPL-CCNC: 53 U/L (ref 5–120)
ANION GAP SERPL CALCULATED.3IONS-SCNC: 11 MMOL/L (ref 5–18)
AST SERPL W P-5'-P-CCNC: 14 U/L (ref 0–40)
BASOPHILS # BLD AUTO: 0 10E3/UL (ref 0–0.2)
BASOPHILS NFR BLD AUTO: 1 %
BILIRUB SERPL-MCNC: 0.8 MG/DL (ref 0–1)
BUN SERPL-MCNC: 10 MG/DL (ref 8–22)
C REACTIVE PROTEIN LHE: 0.2 MG/DL (ref 0–0.8)
CALCIUM SERPL-MCNC: 9.8 MG/DL (ref 8.5–10.5)
CHLORIDE BLD-SCNC: 101 MMOL/L (ref 98–107)
CO2 SERPL-SCNC: 27 MMOL/L (ref 22–31)
CREAT SERPL-MCNC: 0.82 MG/DL (ref 0.7–1.3)
EOSINOPHIL # BLD AUTO: 0.1 10E3/UL (ref 0–0.7)
EOSINOPHIL NFR BLD AUTO: 3 %
ERYTHROCYTE [DISTWIDTH] IN BLOOD BY AUTOMATED COUNT: 13 % (ref 10–15)
ERYTHROCYTE [SEDIMENTATION RATE] IN BLOOD BY WESTERGREN METHOD: 4 MM/HR (ref 0–15)
GFR SERPL CREATININE-BSD FRML MDRD: >90 ML/MIN/1.73M2
GLUCOSE BLD-MCNC: 104 MG/DL (ref 70–125)
HCT VFR BLD AUTO: 41.6 % (ref 40–53)
HGB BLD-MCNC: 13.5 G/DL (ref 13.3–17.7)
IMM GRANULOCYTES # BLD: 0 10E3/UL
IMM GRANULOCYTES NFR BLD: 0 %
LIPASE SERPL-CCNC: <9 U/L (ref 0–52)
LYMPHOCYTES # BLD AUTO: 1.2 10E3/UL (ref 0.8–5.3)
LYMPHOCYTES NFR BLD AUTO: 28 %
MCH RBC QN AUTO: 29 PG (ref 26.5–33)
MCHC RBC AUTO-ENTMCNC: 32.5 G/DL (ref 31.5–36.5)
MCV RBC AUTO: 90 FL (ref 78–100)
MONOCYTES # BLD AUTO: 0.5 10E3/UL (ref 0–1.3)
MONOCYTES NFR BLD AUTO: 12 %
NEUTROPHILS # BLD AUTO: 2.4 10E3/UL (ref 1.6–8.3)
NEUTROPHILS NFR BLD AUTO: 56 %
PLATELET # BLD AUTO: 244 10E3/UL (ref 150–450)
POTASSIUM BLD-SCNC: 4.2 MMOL/L (ref 3.5–5)
PROT SERPL-MCNC: 6.8 G/DL (ref 6–8)
RBC # BLD AUTO: 4.65 10E6/UL (ref 4.4–5.9)
SODIUM SERPL-SCNC: 139 MMOL/L (ref 136–145)
WBC # BLD AUTO: 4.3 10E3/UL (ref 4–11)

## 2021-09-03 PROCEDURE — 85652 RBC SED RATE AUTOMATED: CPT | Performed by: FAMILY MEDICINE

## 2021-09-03 PROCEDURE — 36415 COLL VENOUS BLD VENIPUNCTURE: CPT | Performed by: FAMILY MEDICINE

## 2021-09-03 PROCEDURE — 99213 OFFICE O/P EST LOW 20 MIN: CPT | Performed by: FAMILY MEDICINE

## 2021-09-03 PROCEDURE — 83690 ASSAY OF LIPASE: CPT | Performed by: FAMILY MEDICINE

## 2021-09-03 PROCEDURE — 86141 C-REACTIVE PROTEIN HS: CPT | Performed by: FAMILY MEDICINE

## 2021-09-03 PROCEDURE — 82150 ASSAY OF AMYLASE: CPT | Performed by: FAMILY MEDICINE

## 2021-09-03 PROCEDURE — 85025 COMPLETE CBC W/AUTO DIFF WBC: CPT | Performed by: FAMILY MEDICINE

## 2021-09-03 PROCEDURE — 80053 COMPREHEN METABOLIC PANEL: CPT | Performed by: FAMILY MEDICINE

## 2021-09-09 NOTE — PROGRESS NOTES
Assessment & Plan     Abdominal pain, epigastric  Discussed with patient lab work today to search for etiology of abdominal discomfort  Due to long duration with no exacerbation ongoing symptoms since the start of an SSRI with paroxetine-the time correlating I am suspicious that the medication is causing his abdominal discomfort  We will follow-up based on lab results  Discussed tapering down and off paroxetine to see if symptoms change  If labs are normal and symptoms alleviate with removal of paroxetine go forward with a discussion on alternative medication  If symptoms continue consideration for CT imaging  Patient is in agreement to plan  - CBC with platelets and differential; Future  - ESR: Erythrocyte sedimentation rate; Future  - CRP inflammation; Future  - Amylase; Future  - Lipase; Future  - Comprehensive metabolic panel (BMP + Alb, Alk Phos, ALT, AST, Total. Bili, TP); Future  - CBC with platelets and differential  - ESR: Erythrocyte sedimentation rate  - CRP inflammation  - Amylase  - Lipase  - Comprehensive metabolic panel (BMP + Alb, Alk Phos, ALT, AST, Total. Bili, TP)          No follow-ups on file.    Kevin Reynoso MD  [facility]    Jackie Leal is a 56 year old who presents for the following health issues    HPI   56-year-old male presenting with abdominal pain that has been occurring for roughly 5 to 6 months.  The yara discomfort is typically in the epigastric and left upper quadrant but can be slightly diffuse as well-is present throughout most of the day and he does not have any triggers specifically that exacerbates further symptoms.  Pain does not radiate in any certain direction.  There is been no change in bowel or bladder function.  He has had no fevers or chills.  No red or dark stools.  He is concerned about something internal possibly occurring with an infection or something more serious like a cancer.  In further discussion with the patient  around the time of symptoms started he was started on a medication paroxetine-discussed with him that this could very likely be a side effect of the medication which he had not considered.  We discussed a trial off the medication to see if symptoms alleviate-he is willing to do so but is concerned still about something else we discussed doing lab work today to look for signs of infection or inflammation or concern and if all negative consideration for stopping the paroxetine.  We will titrate him down off the medication.  If symptoms alleviate we can discuss further other options for anxiety in the future.  If symptoms do not and lab work is negative consideration for imaging-patient is in agreement to plan.  To note patient has no red flag symptoms and there is been no weight loss per the patient's knowledge.      Review of Systems   Constitutional, HEENT, cardiovascular, pulmonary, gi and gu systems are negative, except as otherwise noted.      Objective    /75 (BP Location: Left arm, Patient Position: Sitting, Cuff Size: Adult Regular)   Pulse 62   Temp 96.9  F (36.1  C) (Oral)   Resp 16   Wt 78.5 kg (173 lb)   BMI 23.46 kg/m    Body mass index is 23.46 kg/m .  Physical Exam   GENERAL: healthy, alert and no distress  EYES: Eyes grossly normal to inspection, PERRL and conjunctivae and sclerae normal  RESP: lungs clear to auscultation - no rales, rhonchi or wheezes  CV: regular rate and rhythm, normal S1 S2, no S3 or S4, no murmur, click or rub, no peripheral edema and peripheral pulses strong  ABDOMEN: soft, nontender, no hepatosplenomegaly, no masses and bowel sounds normal  MS: no gross musculoskeletal defects noted, no edema

## 2021-10-16 ENCOUNTER — HEALTH MAINTENANCE LETTER (OUTPATIENT)
Age: 56
End: 2021-10-16

## 2021-10-17 ENCOUNTER — MYC MEDICAL ADVICE (OUTPATIENT)
Dept: FAMILY MEDICINE | Facility: CLINIC | Age: 56
End: 2021-10-17

## 2021-10-17 DIAGNOSIS — R09.81 NASAL CONGESTION: Primary | ICD-10-CM

## 2021-11-01 ENCOUNTER — OFFICE VISIT (OUTPATIENT)
Dept: OTOLARYNGOLOGY | Facility: CLINIC | Age: 56
End: 2021-11-01
Attending: FAMILY MEDICINE
Payer: COMMERCIAL

## 2021-11-01 DIAGNOSIS — R09.81 NASAL CONGESTION: ICD-10-CM

## 2021-11-01 DIAGNOSIS — J34.3 NASAL TURBINATE HYPERTROPHY: ICD-10-CM

## 2021-11-01 DIAGNOSIS — R09.81 CHRONIC NASAL CONGESTION: ICD-10-CM

## 2021-11-01 DIAGNOSIS — J34.2 NASAL SEPTAL DEVIATION: Primary | ICD-10-CM

## 2021-11-01 PROCEDURE — 99203 OFFICE O/P NEW LOW 30 MIN: CPT | Performed by: OTOLARYNGOLOGY

## 2021-11-01 RX ORDER — AZELASTINE 1 MG/ML
1 SPRAY, METERED NASAL 2 TIMES DAILY
Qty: 30 ML | Refills: 11 | Status: SHIPPED | OUTPATIENT
Start: 2021-11-01 | End: 2021-11-01

## 2021-11-01 RX ORDER — AZELASTINE 1 MG/ML
1 SPRAY, METERED NASAL 2 TIMES DAILY
Qty: 30 ML | Refills: 11 | Status: SHIPPED | OUTPATIENT
Start: 2021-11-01 | End: 2022-03-29

## 2021-11-01 NOTE — LETTER
11/1/2021         RE: Brennan Vazquez  [address] [place] [address]        Dear Colleague,    Thank you for referring your patient, Brennan Vazquez, to the [facility]. Please see a copy of my visit note below.    CHIEF COMPLAINT:  Nose Problem      HISTORY OF PRESENT ILLNESS    Elvis was seen at the behest of Kevin Reynoso MD for nasal septal problem.  Patient had seen Dr. Baeza in the past and nasal septoplasty been discussed.  Patient has nasal congestion worse at night.  He is aware that there could obstruction seems to be dependent on his sleep position.  He is used Flonase and Afrin nasal spray in the past without improvement.  No history of allergy testing.  He does have a pet at home (dog).    Snot 22 see scanned document       REVIEW OF SYSTEMS    Review of Systems as per HPI and PMHx, otherwise 10 system review system are negative.     Patient has no known allergies.     There were no vitals taken for this visit.    HEAD: Normal appearance and symmetry:  No cutaneous lesions.      NECK:  supple     EARS: normal TM, EACs     NOSE:     Dorsum:   straight  Septum:  right sided septal spur  Mucosa:  moist  Inferior turbinates:  2-3+        ORAL CAVITY/OROPHARYNX:     Lips:  Normal.  Tongue: normal, midline  Mucosa:   no lesions  Tonsils:  1+     NECK:  Trachea:  midline.              Thyroid:  normal              Adenopathy:  none        NEURO:   Alert and Oriented     GAIT AND STATION:  normal     RESPIRATORY:   Symmetry and Respiratory effort     PSYCH:  Normal mood and affect     SKIN:   warm and dry         IMPRESSION:    Encounter Diagnoses   Name Primary?     Nasal septal deviation Yes     Nasal congestion      Nasal turbinate hypertrophy      Patient does have facial pressure pain along with the nasal congestion.  Recommend CT of the sinus to further evaluate evaluate for nasal sinonasal obstruction.  Briefly discussed possibility of a balloon assisted  septoplasty along with turbinate reduction.  We will discuss this in more detail at his next visit     RECOMMENDATIONS:    CT sinus  Allergy referral  Trial of astelin nasal spray  RTC 1 month to review CT images                Again, thank you for allowing me to participate in the care of your patient.        Sincerely,        Flavio Odonnell MD

## 2021-11-01 NOTE — PATIENT INSTRUCTIONS
Patient Education     Nasal Surgery: Septoplasty    You re scheduled to have nasal surgery. The type of nasal surgery you re having is called septoplasty. Read on to learn more about what to expect during this surgery. During surgery, the surgeon may remove cartilage and bone to reshape the deviated septum. After surgery, there is more breathing space. Enough cartilage and bone remain to give the nose support.   What to expect during septoplasty  This surgery repairs a blockage inside the nose caused by a deviated septum. With a deviated septum, there is a problem with the wall that divides the nose into two chambers. A deviated septum may block air coming through one or both nostrils. This makes it harder for you to breathe through your nose. During septoplasty, the surgeon makes incisions inside the nose. Then the surgeon trims, reshapes, moves, or removes cartilage and sometimes bone from the septum.   Risks  As with any surgery, nasal surgery has some risks. These include a slight risk of bleeding and infection. Your healthcare provider will discuss any other risks and complications with you.   After septoplasty  After septoplasty, you ll be taken to a recovery area or to your hospital room. Your experience may be as follows:     You may have packing material inside your nose. This reduces bleeding and helps with healing. You may also have bandages (dressings) on the outside of your nose.    It s normal to have some mucus and blood drain from your nose. Until packing is removed, you may have to breathe through your mouth.    You may have some swelling or bruising around the eyelids if a rhinoplasty was also done.    Expect some throat dryness and irritation.    Pain medicine will be prescribed as needed. Talk with your healthcare provider about medicine you should not take after your procedure.  Follow-up care  You ll need to follow up with your healthcare provider within a week after your surgery. Here is what  to expect:     Any packing, splint, or dressings will likely be removed. You may feel mild pain and bleed a little when this is done.    After the splint or packing is removed, you ll most likely breathe better than you did before surgery.    You may have minor numbness, pain, swelling, and a little stiffness under the tip of the nose.    In a few days, the inside of your nose may swell and briefly block your breathing. Or a scab or crust may block breathing for a short time. Leave the scab alone. Your provider can remove it. Using saline (irrigation or aerosol) regularly after surgery helps to reduce the amount of crusting at each visit.    Contact your provider if you have any questions or concerns.  [employer] last reviewed this educational content on 7/1/2019 2000-2021 [employer]. All rights reserved. This information is not intended as a substitute for professional medical care. Always follow your healthcare professional's instructions.

## 2021-11-01 NOTE — PROGRESS NOTES
CHIEF COMPLAINT:  Nose Problem      HISTORY OF PRESENT ILLNESS    Elvis was seen at the behest of Kevin Reynoso MD for nasal septal problem.  Patient had seen Dr. Baeza in the past and nasal septoplasty been discussed.  Patient has nasal congestion worse at night.  He is aware that there could obstruction seems to be dependent on his sleep position.  He is used Flonase and Afrin nasal spray in the past without improvement.  No history of allergy testing.  He does have a pet at home (dog).    Snot 22 see scanned document       REVIEW OF SYSTEMS    Review of Systems as per HPI and PMHx, otherwise 10 system review system are negative.     Patient has no known allergies.     There were no vitals taken for this visit.    HEAD: Normal appearance and symmetry:  No cutaneous lesions.      NECK:  supple     EARS: normal TM, EACs     NOSE:     Dorsum:   straight  Septum:  right sided septal spur  Mucosa:  moist  Inferior turbinates:  2-3+        ORAL CAVITY/OROPHARYNX:     Lips:  Normal.  Tongue: normal, midline  Mucosa:   no lesions  Tonsils:  1+     NECK:  Trachea:  midline.              Thyroid:  normal              Adenopathy:  none        NEURO:   Alert and Oriented     GAIT AND STATION:  normal     RESPIRATORY:   Symmetry and Respiratory effort     PSYCH:  Normal mood and affect     SKIN:   warm and dry         IMPRESSION:    Encounter Diagnoses   Name Primary?     Nasal septal deviation Yes     Nasal congestion      Nasal turbinate hypertrophy      Patient does have facial pressure pain along with the nasal congestion.  Recommend CT of the sinus to further evaluate evaluate for nasal sinonasal obstruction.  Briefly discussed possibility of a balloon assisted septoplasty along with turbinate reduction.  We will discuss this in more detail at his next visit     RECOMMENDATIONS:    CT sinus  Allergy referral  Trial of astelin nasal spray  RTC 1 month to review CT images

## 2021-11-03 ENCOUNTER — HOSPITAL ENCOUNTER (OUTPATIENT)
Dept: CT IMAGING | Facility: HOSPITAL | Age: 56
Discharge: HOME | End: 2021-11-03
Attending: OTOLARYNGOLOGY | Admitting: OTOLARYNGOLOGY
Payer: COMMERCIAL

## 2021-11-03 DIAGNOSIS — R09.81 CHRONIC NASAL CONGESTION: ICD-10-CM

## 2021-11-03 DIAGNOSIS — J34.2 NASAL SEPTAL DEVIATION: ICD-10-CM

## 2021-11-03 PROCEDURE — 70486 CT MAXILLOFACIAL W/O DYE: CPT

## 2021-11-07 DIAGNOSIS — G47.00 INSOMNIA, UNSPECIFIED TYPE: ICD-10-CM

## 2021-11-09 RX ORDER — ZOLPIDEM TARTRATE 10 MG/1
TABLET ORAL
Qty: 30 TABLET | Refills: 0 | Status: SHIPPED | OUTPATIENT
Start: 2021-11-09 | End: 2021-12-07

## 2021-11-09 NOTE — TELEPHONE ENCOUNTER
Routing refill request to provider for review/approval because:  Controlled substance request    Last Written Prescription Date:  5/12/21  Last Fill Quantity: 30,  # refills: 4   Last office visit provider:  9/3/21     Requested Prescriptions   Pending Prescriptions Disp Refills     zolpidem (AMBIEN) 10 MG tablet [Pharmacy Med Name: ZOLPIDEM 10MG TABLETS] 30 tablet      Sig: TAKE 1 TABLET(10 MG) BY MOUTH AT BEDTIME AS NEEDED FOR SLEEP       There is no refill protocol information for this order          Shalom Calvillo RN 11/09/21 1:00 PM

## 2021-11-18 ENCOUNTER — OFFICE VISIT (OUTPATIENT)
Dept: OTOLARYNGOLOGY | Facility: CLINIC | Age: 56
End: 2021-11-18
Payer: COMMERCIAL

## 2021-11-18 DIAGNOSIS — J34.3 NASAL TURBINATE HYPERTROPHY: ICD-10-CM

## 2021-11-18 DIAGNOSIS — J31.0 CHRONIC RHINITIS: ICD-10-CM

## 2021-11-18 DIAGNOSIS — J34.2 NASAL SEPTAL DEVIATION: Primary | ICD-10-CM

## 2021-11-18 PROCEDURE — 99214 OFFICE O/P EST MOD 30 MIN: CPT | Performed by: OTOLARYNGOLOGY

## 2021-11-18 NOTE — PATIENT INSTRUCTIONS
Possible risks from nasal septoplasty:       Continued symptoms, such as nasal obstruction.     Excessive bleeding.     A change in the shape of your nose.     A hole in the septum.     Decrease or loss of smell (temporary or permanent)    Accumulation of blood in the septum that has to be drained.     Temporary numbness in the upper gum, teeth or nose.    Patient Education     Nasal Surgery: Septoplasty    You re scheduled to have nasal surgery. The type of nasal surgery you re having is called septoplasty. Read on to learn more about what to expect during this surgery. During surgery, the surgeon may remove cartilage and bone to reshape the deviated septum. After surgery, there is more breathing space. Enough cartilage and bone remain to give the nose support.   What to expect during septoplasty  This surgery repairs a blockage inside the nose caused by a deviated septum. With a deviated septum, there is a problem with the wall that divides the nose into two chambers. A deviated septum may block air coming through one or both nostrils. This makes it harder for you to breathe through your nose. During septoplasty, the surgeon makes incisions inside the nose. Then the surgeon trims, reshapes, moves, or removes cartilage and sometimes bone from the septum.   Risks  As with any surgery, nasal surgery has some risks. These include a slight risk of bleeding and infection. Your healthcare provider will discuss any other risks and complications with you.   After septoplasty  After septoplasty, you ll be taken to a recovery area or to your hospital room. Your experience may be as follows:     You may have packing material inside your nose. This reduces bleeding and helps with healing. You may also have bandages (dressings) on the outside of your nose.    It s normal to have some mucus and blood drain from your nose. Until packing is removed, you may have to breathe through your mouth.    You may have some swelling or  bruising around the eyelids if a rhinoplasty was also done.    Expect some throat dryness and irritation.    Pain medicine will be prescribed as needed. Talk with your healthcare provider about medicine you should not take after your procedure.  Follow-up care  You ll need to follow up with your healthcare provider within a week after your surgery. Here is what to expect:     Any packing, splint, or dressings will likely be removed. You may feel mild pain and bleed a little when this is done.    After the splint or packing is removed, you ll most likely breathe better than you did before surgery.    You may have minor numbness, pain, swelling, and a little stiffness under the tip of the nose.    In a few days, the inside of your nose may swell and briefly block your breathing. Or a scab or crust may block breathing for a short time. Leave the scab alone. Your provider can remove it. Using saline (irrigation or aerosol) regularly after surgery helps to reduce the amount of crusting at each visit.    Contact your provider if you have any questions or concerns.  [employer] last reviewed this educational content on 7/1/2019 2000-2021 [employer]. All rights reserved. This information is not intended as a substitute for professional medical care. Always follow your healthcare professional's instructions.

## 2021-11-18 NOTE — PROGRESS NOTES
CHIEF COMPLAINT:  Recheck      HISTORY OF PRESENT ILLNESS    Elvis was seen in follow up for CT review.  Patient returns for CT review after trial of Astelin nasal spray.  His allergy is allergy evaluation was scheduled but is not until next January.  He had minimal improvement with the Astelin nasal spray.  He continues to have positional nasal obstruction right side greater than left.  This disrupts his sleep and interferes with his quality of life.  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  He has had history of hip replacement bilaterally and requires antibiotics before procedures.      CT  Sinus:    1.  Mild nonobstructive inflammatory paranasal sinus disease, as described.  2.  Mild deviation of the nasal septum to the right with a right-sided nasal septal spur.      My last note:    Encounter Diagnoses   Name Primary?     Nasal septal deviation Yes     Nasal congestion       Nasal turbinate hypertrophy        Patient does have facial pressure pain along with the nasal congestion.  Recommend CT of the sinus to further evaluate evaluate for nasal sinonasal obstruction.  Briefly discussed possibility of a balloon assisted septoplasty along with turbinate reduction.  We will discuss this in more detail at his next visit     RECOMMENDATIONS:     CT sinus  Allergy referral  Trial of astelin nasal spray  RTC 1 month to review CT images         REVIEW OF SYSTEMS    Review of Systems: a 10-system review is reviewed at this encounter.  See scanned document.     Patient has no known allergies.     PHYSICAL EXAM:        HEAD: Normal appearance and symmetry:  No cutaneous lesions.      EARS:   Auricles normal         NOSE:    Dorsum:   straight       ORAL CAVITY/OROPHARYNX:    Lips:  Normal.     NECK:  Trachea:  midline       NEURO:   Alert and Oriented    GAIT AND STATION:  normal     RESPIRATORY:   Symmetry and Respiratory effort    PSYCH:   normal mood and  affect    SKIN:  warm and dry         IMPRESSION:   Encounter Diagnoses   Name Primary?     Nasal septal deviation Yes     Nasal turbinate hypertrophy      Chronic rhinitis      Reviewed the CT in detail with Elvis.  There is nasal septal deviation with a bony spur posteriorly on the right as well as significant inferior turbinate hypertrophy.  Patient also significant significant runny nose and postnasal drip.  He would benefit from a balloon assisted nasal septoplasty along with cryoablation of posterior nasal tissue and radiofrequency reduction of inferior turbinates.  Risk benefits of this procedure are discussed including need for further procedures and fail to completely resolve symptoms.  He is agreeable this plan of care.  We will schedule this accordingly.    RECOMMENDATIONS:    Recommend Balloon assisted septoplasty with cryoablation of posterior nasal and RF reduction.  R/B/A  Discussed.

## 2021-11-19 ENCOUNTER — MYC MEDICAL ADVICE (OUTPATIENT)
Dept: SURGERY | Facility: CLINIC | Age: 56
End: 2021-11-19
Payer: COMMERCIAL

## 2021-11-19 PROBLEM — J34.3 NASAL TURBINATE HYPERTROPHY: Status: ACTIVE | Noted: 2021-11-19

## 2021-11-19 PROBLEM — J34.2 NASAL SEPTAL DEVIATION: Status: ACTIVE | Noted: 2021-11-19

## 2021-11-19 PROBLEM — J31.0 CHRONIC RHINITIS: Status: ACTIVE | Noted: 2021-11-19

## 2021-11-19 NOTE — LETTER
We've received instruction to get you scheduled for surgery with Dr Odonnell. We have that arranged as follows:     Surgery Date: 3/29/2022  Location: [address], [address] (3rd floor) [facility]  Arrival Time: 12:00 pm (Unless instructed differently by the pre-op call nurse)     Post op Appointment: 4/13/2022 at 8:00 am with Dr. Odonnell     Prep Instructions:     1. Please schedule a pre-op physical with your primary care doctor. This may be virtual or face-to-face depending on your doctors preference. Call them right away to schedule this.    2. PCR-Rated COVID19 testing is required within 4 days of surgery. We have this scheduled for you at [facility], [address] [place] [address], 1st Floor on 3/25/2022 at 9:00 am. Follow the specific instructions you receive by Angela. If your test is positive, your surgery will be canceled.     3. Nothing to eat or drink for 8 hours before surgery unless instructed differently by the pre-op nurse.    4. If the community sees a new COVID19 surge, your procedure may need to be postponed. We will contact you if this happens.     5. You will need an adult to drive you home and stay with you 24 hours after surgery.     6. You may have one family member wait in the lobby at the surgery center during your surgery. Visitor restrictions are subject to change, please verify with the pre-op nurse when they call.    7. When you arrive to the surgery center, you will be screened for COVID19 symptoms. If you screen positive, your surgery will need to be postponed.    8. We always encourage you to notify your insurance any time you have medical tests or procedures scheduled including surgery. The number is usually right on the back of your insurance card. Please call [facility] Cost of Care at [phone] for the surgeon fees, and [facility] Cost of Care [phone] for facility fees if you need pricing information.     9. You will receive  a call from a pre-op call nurse 1-3 days prior to surgery. She will go over more details with you.     Call our office if you have any questions! Thank you!

## 2021-12-06 DIAGNOSIS — G47.00 INSOMNIA, UNSPECIFIED TYPE: ICD-10-CM

## 2021-12-06 NOTE — TELEPHONE ENCOUNTER
Spoke with Elvis over the phone today regarding surgery scheduling with Dr. Perry MSC on  date: 3/29/2022.    Covid Test: Date: 3/25/2022 at 9:00 am, Location: [facility]  Post Op: Date: 4/13/2022 at 8:00 am, Location: [facility]    Letter sent via [employer]

## 2021-12-07 RX ORDER — ZOLPIDEM TARTRATE 10 MG/1
TABLET ORAL
Qty: 30 TABLET | Refills: 0 | Status: SHIPPED | OUTPATIENT
Start: 2021-12-07 | End: 2022-01-07

## 2022-01-04 ENCOUNTER — VIRTUAL VISIT (OUTPATIENT)
Dept: URGENT CARE | Facility: CLINIC | Age: 57
End: 2022-01-04
Payer: COMMERCIAL

## 2022-01-04 ENCOUNTER — DOCUMENTATION ONLY (OUTPATIENT)
Dept: LAB | Facility: CLINIC | Age: 57
End: 2022-01-04

## 2022-01-04 DIAGNOSIS — J01.90 ACUTE SINUSITIS WITH SYMPTOMS > 10 DAYS: Primary | ICD-10-CM

## 2022-01-04 DIAGNOSIS — R09.82 POST-NASAL DRAINAGE: ICD-10-CM

## 2022-01-04 DIAGNOSIS — R05.9 COUGH: ICD-10-CM

## 2022-01-04 DIAGNOSIS — R07.89 CHEST WALL PAIN: ICD-10-CM

## 2022-01-04 PROCEDURE — 99213 OFFICE O/P EST LOW 20 MIN: CPT | Mod: GT | Performed by: PHYSICIAN ASSISTANT

## 2022-01-04 RX ORDER — METHYLPREDNISOLONE 4 MG
TABLET, DOSE PACK ORAL
Qty: 21 TABLET | Refills: 0 | Status: SHIPPED | OUTPATIENT
Start: 2022-01-04 | End: 2022-01-13

## 2022-01-04 RX ORDER — CODEINE PHOSPHATE AND GUAIFENESIN 10; 100 MG/5ML; MG/5ML
1-2 SOLUTION ORAL EVERY 4 HOURS PRN
Qty: 180 ML | Refills: 0 | Status: SHIPPED | OUTPATIENT
Start: 2022-01-04 | End: 2022-02-25

## 2022-01-04 NOTE — PROGRESS NOTES
Brennan Vazquez has an upcoming lab appointment:    Future Appointments   Date Time Provider Department Center   1/4/2022  3:00 PM  VIRTUAL CARE PROVIDER [facility]   1/6/2022  9:30 AM [facility]   1/13/2022  8:00 AM Estela Castro MD [facility]   3/25/2022  9:00 AM MPLW COVID LAB [facility]   4/13/2022  8:00 AM Flavio Odonnell MD [facility]     Patient is scheduled for the following lab(s):  lipid    There is no order available. Please review and place either future orders or HMPO (Review of Health Maintenance Protocol Orders), as appropriate.    Health Maintenance Due   Topic     ANNUAL REVIEW OF HM ORDERS      HIV SCREENING      Brittany Barnes, ZULEYKAT

## 2022-01-04 NOTE — PROGRESS NOTES
Elvis is a 56 year old who is being evaluated via a billable video visit.  2    Video Start Time: 3:10 PM    Assessment & Plan     Acute sinusitis with symptoms > 10 days  - amoxicillin-clavulanate (AUGMENTIN) 875-125 MG tablet; Take 1 tablet by mouth 2 times daily for 7 days    Chest wall pain  - methylPREDNISolone (MEDROL DOSEPAK) 4 MG tablet therapy pack; Follow Package Directions    Post-nasal drainage    Cough  - guaiFENesin-codeine (ROBITUSSIN AC) 100-10 MG/5ML solution; Take 5-10 mLs by mouth every 4 hours as needed for cough    I will treat for sinus infection and cough and have him follow up as needed.     Valentine Lovell PA-C  Virtual Urgent Care  [facility] VIRTUAL URGENT CARE    Subjective   Elvis is a 56 year old who presents for the following health issues : sinus pain    HPI - Patient started having some sore throat, cough and sinus pain on 12/25 he says the throat symptoms resolved but the cough from the drainage and the sinus congestion and pain have persisted and worsened. He has been having pain and pressure on the right side. He has not had fevers, he has been using OTC sinus medicine without relief. He had a negative Covid test 6 days ago.     Review of Systems   Constitutional, HEENT, cardiovascular, pulmonary, gi and gu systems are negative, except as otherwise noted.      Objective           Vitals:  No vitals were obtained today due to virtual visit.    Physical Exam   GENERAL: alert and no distress  EYES: Eyes grossly normal to inspection.  No discharge or erythema, or obvious scleral/conjunctival abnormalities.  HENT: rhinorrhea and congestion, oropharynx clear, oral mucous membranes moist and sinus tenderness on the right per patient  RESP: dry cough, no wheezing, no respiratory distress  SKIN: Visible skin clear. No significant rash, abnormal pigmentation or lesions.  NEURO: Cranial nerves grossly intact.  Mentation and speech appropriate for age.  PSYCH: Mentation appears normal,  affect normal/bright, judgement and insight intact, normal speech and appearance well-groomed.      Video-Visit Details    Type of service:  Video Visit    Video End Time:3:18 PM    Originating Location (pt. Location): Home    Distant Location (provider location):  [facility]     Platform used for Video Visit: [employer]

## 2022-01-06 ENCOUNTER — LAB (OUTPATIENT)
Dept: LAB | Facility: CLINIC | Age: 57
End: 2022-01-06
Payer: COMMERCIAL

## 2022-01-06 DIAGNOSIS — Z13.220 LIPID SCREENING: ICD-10-CM

## 2022-01-06 LAB
CHOLEST SERPL-MCNC: 224 MG/DL
FASTING STATUS PATIENT QL REPORTED: YES
HDLC SERPL-MCNC: 53 MG/DL
LDLC SERPL CALC-MCNC: 153 MG/DL
TRIGL SERPL-MCNC: 89 MG/DL

## 2022-01-06 PROCEDURE — 80061 LIPID PANEL: CPT

## 2022-01-06 PROCEDURE — 36415 COLL VENOUS BLD VENIPUNCTURE: CPT

## 2022-01-13 ENCOUNTER — OFFICE VISIT (OUTPATIENT)
Dept: ALLERGY | Facility: CLINIC | Age: 57
End: 2022-01-13
Payer: COMMERCIAL

## 2022-01-13 VITALS — HEART RATE: 62 BPM | OXYGEN SATURATION: 98 % | BODY MASS INDEX: 23.19 KG/M2 | HEIGHT: 73 IN | WEIGHT: 175 LBS

## 2022-01-13 DIAGNOSIS — R09.81 CHRONIC NASAL CONGESTION: ICD-10-CM

## 2022-01-13 DIAGNOSIS — J30.81 ALLERGIC RHINITIS DUE TO CAT HAIR: Primary | ICD-10-CM

## 2022-01-13 PROCEDURE — 95004 PERQ TESTS W/ALRGNC XTRCS: CPT | Performed by: ALLERGY & IMMUNOLOGY

## 2022-01-13 PROCEDURE — 99203 OFFICE O/P NEW LOW 30 MIN: CPT | Mod: 25 | Performed by: ALLERGY & IMMUNOLOGY

## 2022-01-13 ASSESSMENT — MIFFLIN-ST. JEOR: SCORE: 1674.73

## 2022-01-13 NOTE — PROGRESS NOTES
"      Subjective       HPI     Chief complaint: Allergies    History of present illness: This is a pleasant 56-year-old gentleman that I was asked to see for evaluation by Dr. Odonnell in regards to allergies.  Patient states for last 5 years or so he has had allergy symptoms.  He states his right nostril will become very plugged.  He will have itchy, watery eyes and runny nose.  He states this tends to happen during spring, summer and fall.  He was on Flonase but was recently changed to Astelin and that seems to help.  He uses Claritin as needed which seems to help some.  No history of asthma.  No cough, wheeze or shortness of breath.  He uses lubricating eyedrops.    Past medical history: Low bone density, high cholesterol hypertension, hip replacement      Social history: He recently retired from being a , lives in a home that was built in 1968, has carpeting, no water damage, does have a cat, non-smoker, non-smoking environment    Family history: Negative for asthma and allergies    Review of Systems   Constitutional, HEENT, cardiovascular, pulmonary, gi and gu systems are negative, except as otherwise noted.      Objective    Pulse 62   Ht 1.842 m (6' 0.5\")   Wt 79.4 kg (175 lb)   SpO2 98%   BMI 23.41 kg/m    Body mass index is 23.41 kg/m .  Physical Exam      Gen: Pleasant male not in acute distress  HEENT: Eyes no erythema of the bulbar or palpebral conjunctiva, no edema. Ears: No deformities or lesions nose: No congestion, mucosa normal. Mouth: Throat clear, no lip or tongue edema.   Cardiac: Regular rate and rhythm, no murmurs, rubs or gallops  Respiratory: Clear to auscultation bilaterally, no adventitious breath sounds  Lymph: No visible supraclavicular or cervical lymphadenopathy  Skin: No rashes or lesions  Psych: Alert and oriented times 3    30 percutaneous test were placed to the environmental skin test panel.  Positive histamine control with a positive test to cat at 3 mm.  Please see " scanned photograph.    Impression report and plan:  1.  Allergic rhinitis to cat  2.  Nonallergic rhinitis    Allergy testing had a small positive to cat.  This could be contributing but I suspect is not the sole source of his symptoms.  I did review environmental control.  Astelin seems to be helping and he is scheduled to have surgery with Dr. Odonnell at the end of March.  Follow-up as needed.

## 2022-01-13 NOTE — LETTER
"    1/13/2022         RE: Brennan Vazquez  [address] [place] [address]        Dear Colleague,    Thank you for referring your patient, Brennan Vazquez, to the [facility]. Please see a copy of my visit note below.          Subjective       HPI     Chief complaint: Allergies    History of present illness: This is a pleasant 56-year-old gentleman that I was asked to see for evaluation by Dr. Odonnell in regards to allergies.  Patient states for last 5 years or so he has had allergy symptoms.  He states his right nostril will become very plugged.  He will have itchy, watery eyes and runny nose.  He states this tends to happen during spring, summer and fall.  He was on Flonase but was recently changed to Astelin and that seems to help.  He uses Claritin as needed which seems to help some.  No history of asthma.  No cough, wheeze or shortness of breath.  He uses lubricating eyedrops.    Past medical history: Low bone density, high cholesterol hypertension, hip replacement      Social history: He recently retired from being a , lives in a home that was built in 1968, has carpeting, no water damage, does have a cat, non-smoker, non-smoking environment    Family history: Negative for asthma and allergies    Review of Systems   Constitutional, HEENT, cardiovascular, pulmonary, gi and gu systems are negative, except as otherwise noted.      Objective    Pulse 62   Ht 1.842 m (6' 0.5\")   Wt 79.4 kg (175 lb)   SpO2 98%   BMI 23.41 kg/m    Body mass index is 23.41 kg/m .  Physical Exam      Gen: Pleasant male not in acute distress  HEENT: Eyes no erythema of the bulbar or palpebral conjunctiva, no edema. Ears: No deformities or lesions nose: No congestion, mucosa normal. Mouth: Throat clear, no lip or tongue edema.   Cardiac: Regular rate and rhythm, no murmurs, rubs or gallops  Respiratory: Clear to auscultation bilaterally, no adventitious breath sounds  Lymph: No visible " supraclavicular or cervical lymphadenopathy  Skin: No rashes or lesions  Psych: Alert and oriented times 3    30 percutaneous test were placed to the environmental skin test panel.  Positive histamine control with a positive test to cat at 3 mm.  Please see scanned photograph.    Impression report and plan:  1.  Allergic rhinitis to cat  2.  Nonallergic rhinitis    Allergy testing had a small positive to cat.  This could be contributing but I suspect is not the sole source of his symptoms.  I did review environmental control.  Astelin seems to be helping and he is scheduled to have surgery with Dr. Odonnell at the end of March.  Follow-up as needed.      Again, thank you for allowing me to participate in the care of your patient.        Sincerely,        Estela SIDDIQUI MD

## 2022-02-20 DIAGNOSIS — Z11.59 ENCOUNTER FOR SCREENING FOR OTHER VIRAL DISEASES: Primary | ICD-10-CM

## 2022-02-25 ENCOUNTER — OFFICE VISIT (OUTPATIENT)
Dept: SURGERY | Facility: CLINIC | Age: 57
End: 2022-02-25
Attending: FAMILY MEDICINE
Payer: COMMERCIAL

## 2022-02-25 VITALS — SYSTOLIC BLOOD PRESSURE: 108 MMHG | DIASTOLIC BLOOD PRESSURE: 70 MMHG

## 2022-02-25 DIAGNOSIS — K40.90 LEFT INGUINAL HERNIA: ICD-10-CM

## 2022-02-25 PROCEDURE — 99203 OFFICE O/P NEW LOW 30 MIN: CPT | Performed by: SPECIALIST

## 2022-02-25 RX ORDER — ACETAMINOPHEN 325 MG/1
975 TABLET ORAL ONCE
Status: CANCELLED | OUTPATIENT
Start: 2022-02-25 | End: 2022-02-25

## 2022-02-25 RX ORDER — AMOXICILLIN 500 MG/1
TABLET, FILM COATED ORAL
COMMUNITY
Start: 2021-12-07 | End: 2023-01-12

## 2022-02-25 NOTE — PROGRESS NOTES
HPI:  This is a 57 year old male here today with concerns of pain and bulging in his left groin area. He has noted this for the past a a few  month. The symptoms have progressed and increased over this time. He comes in for evaluation secondary to the hernia causing enough issues to bother them with daily activities or chores.    Allergies:Patient has no known allergies.    Past Medical History:   Diagnosis Date     Anxiety      HLD (hyperlipidemia)      HTN (hypertension)      Osteoarthritis of left hip 6/5/2017     Osteopenia        Past Surgical History:   Procedure Laterality Date     TOTAL HIP ARTHROPLASTY Left 08/2017    [facility]        CURRENT MEDS:      Family History   Problem Relation Age of Onset     Hypertension Mother      Hyperlipidemia Mother      Hypertension Maternal Aunt      Hypertension Maternal Uncle      Hypertension Maternal Grandmother      Breast Cancer Maternal Grandmother      Hypertension Maternal Grandfather         reports that he has never smoked. He has never used smokeless tobacco. He reports current alcohol use. He reports that he does not use drugs.    Review of Systems - Negative except groin bulge and pain, and sinus issues. Plans sinus surgery at the end of march. Otherwise twelve system of review is negative.      Vitals:    02/25/22 1316   BP: 108/70       There is no height or weight on file to calculate BMI.    EXAM:  General: NAD   HEENT: normocephalic, PERRLA and EOMS intact  Mounth: Mucus membranes moist  Neck: Supple  Chest: Clear to auscultation bilaterally  CV: RRR  ABD: Soft nontender and nondistended, left inguinal hernia, reducible  EXT: Warm, pulses intact,   Neuro: Alert and oriented x3  Back: no CVA tenderness      Assessment/Plan: Pt with a left inguinal hernia. I discussed this at length with He.  I went over conservative management as well as surgical treatment of this.. I would plan on doing this via anlaparoscopic  approach with possible use of  mesh. I went over the small risks of surgery including but not limited to bleeding and infection, anesthesia, recurrence rates and nerve injury. I discussed the expected recovery time as well. Will get this scheduled. He will contact us to have this scheduled.      Bruce Roldan MD ,MD Bruce Roldan MD  General Surgery [phone]  Vascular Surgery [phone]

## 2022-02-25 NOTE — LETTER
2/25/2022         RE: Brennan Vazquez  [address] [place] [address]        Dear Colleague,    Thank you for referring your patient, Brennan Vazquez, to the [facility] [place]. Please see a copy of my visit note below.          HPI:  This is a 57 year old male here today with concerns of pain and bulging in his left groin area. He has noted this for the past a a few  month. The symptoms have progressed and increased over this time. He comes in for evaluation secondary to the hernia causing enough issues to bother them with daily activities or chores.    Allergies:Patient has no known allergies.    Past Medical History:   Diagnosis Date     Anxiety      HLD (hyperlipidemia)      HTN (hypertension)      Osteoarthritis of left hip 6/5/2017     Osteopenia        Past Surgical History:   Procedure Laterality Date     TOTAL HIP ARTHROPLASTY Left 08/2017    [facility]        CURRENT MEDS:      Family History   Problem Relation Age of Onset     Hypertension Mother      Hyperlipidemia Mother      Hypertension Maternal Aunt      Hypertension Maternal Uncle      Hypertension Maternal Grandmother      Breast Cancer Maternal Grandmother      Hypertension Maternal Grandfather         reports that he has never smoked. He has never used smokeless tobacco. He reports current alcohol use. He reports that he does not use drugs.    Review of Systems - Negative except groin bulge and pain, and sinus issues. Plans sinus surgery at the end of march. Otherwise twelve system of review is negative.      Vitals:    02/25/22 1316   BP: 108/70       There is no height or weight on file to calculate BMI.    EXAM:  General: NAD   HEENT: normocephalic, PERRLA and EOMS intact  Mounth: Mucus membranes moist  Neck: Supple  Chest: Clear to auscultation bilaterally  CV: RRR  ABD: Soft nontender and nondistended, left inguinal hernia, reducible  EXT: Warm, pulses intact,   Neuro: Alert  and oriented x3  Back: no CVA tenderness      Assessment/Plan: Pt with a left inguinal hernia. I discussed this at length with He.  I went over conservative management as well as surgical treatment of this.. I would plan on doing this via anlaparoscopic  approach with possible use of mesh. I went over the small risks of surgery including but not limited to bleeding and infection, anesthesia, recurrence rates and nerve injury. I discussed the expected recovery time as well. Will get this scheduled. He will contact us to have this scheduled.      Bruce Roldan MD ,MD Bruce Roldan MD  General Surgery [phone]  Vascular Surgery [phone]          Again, thank you for allowing me to participate in the care of your patient.        Sincerely,        Bruce Roldan MD

## 2022-03-04 ENCOUNTER — TELEPHONE (OUTPATIENT)
Dept: SURGERY | Facility: CLINIC | Age: 57
End: 2022-03-04
Payer: COMMERCIAL

## 2022-03-04 PROBLEM — K40.90 LEFT INGUINAL HERNIA: Status: ACTIVE | Noted: 2022-03-04

## 2022-03-04 NOTE — LETTER
We've received instruction to get you scheduled for surgery with Dr Schumacher. We have that arranged as follows:     Surgery Date: 4/22/2022  Location: [address], [address] (3rd floor) [facility]  Arrival Time: 7:00 am (Unless instructed differently by the pre-op call nurse)     Post op Appointment: 5/9/2022 at 2:00 pm with a physician assistant over the phone     Prep Instructions:     1. Please schedule a pre-op physical with your primary care doctor. This may be virtual or face-to-face depending on your doctors preference. Call them right away to schedule this.    2. PCR-Rated COVID19 testing is required within 4 days of surgery. We have this scheduled for you at [facility], [address] [place] [address], 1st Floor on 4/18/2022 at 10:00 am. Follow the specific instructions you receive by Angela. If your test is positive, your surgery will be canceled.     3. Nothing to eat or drink for 8 hours before surgery unless instructed differently by the pre-op nurse.    4. If the community sees a new COVID19 surge, your procedure may need to be postponed. We will contact you if this happens.     5. You will need an adult to drive you home and stay with you 24 hours after surgery. Public transportation or Medical Van Services are not permitted.    6. You may have one family member wait in the lobby at the surgery center during your surgery. Visitor restrictions are subject to change, please verify with the pre-op nurse when they call.    7. You will be screened for high-risk exposure to Covid-19 during the pre-op call.  We encourage you to quarantine yourself away from any Covid-19 people for 10 days before surgery to avoid possible last minute cancellations.   When you arrive to the surgery center, you will again be screened for COVID19 symptoms. If you screen positive, your surgery will need to be postponed.    8. We always encourage you to notify your insurance any time you have  medical tests or procedures scheduled including surgery. The number is usually right on the back of your insurance card. Please call [facility] Cost of Care at [phone] for the surgeon fees, and [facility] Cost of Care [phone] for facility fees if you need pricing information.     9. You will receive a call from a pre-op call nurse 1-3 days prior to surgery. She will go over more details with you.     Call our office if you have any questions! Thank you!

## 2022-03-04 NOTE — TELEPHONE ENCOUNTER
Spoke with miley today regarding surgery scheduling   with Dr. Roldan   at [facility] on  date: 2022 Estimated arrival 7:00 AM    Patient was informed of the followin. Patient has been informed to consult their PCP/Cardiologist about stopping their blood thinners about a week before surgery.  2. Pre Op Physical will need to be done within 30 days prior to surgery  3. Required Covid Test with in 4 days prior to surgery: Date: 2022 at 10:00 am, Location: [facility]  4. Ride required after surgery, can not use [employer] or public transportation.    Patient was informed that failure to do so may result in cancellation of surgery      Post Op: Date: 2022 at 2:00pm, Location: tele      Surgery Letter sent via [employer]

## 2022-03-11 DIAGNOSIS — Z11.59 ENCOUNTER FOR SCREENING FOR OTHER VIRAL DISEASES: Primary | ICD-10-CM

## 2022-03-16 ENCOUNTER — OFFICE VISIT (OUTPATIENT)
Dept: FAMILY MEDICINE | Facility: CLINIC | Age: 57
End: 2022-03-16
Payer: COMMERCIAL

## 2022-03-16 VITALS
HEIGHT: 73 IN | DIASTOLIC BLOOD PRESSURE: 75 MMHG | SYSTOLIC BLOOD PRESSURE: 132 MMHG | BODY MASS INDEX: 24.25 KG/M2 | WEIGHT: 183 LBS | TEMPERATURE: 97.6 F | HEART RATE: 57 BPM

## 2022-03-16 DIAGNOSIS — Z01.818 PREOP GENERAL PHYSICAL EXAM: Primary | ICD-10-CM

## 2022-03-16 DIAGNOSIS — H61.21 IMPACTED CERUMEN OF RIGHT EAR: ICD-10-CM

## 2022-03-16 DIAGNOSIS — E78.2 MIXED HYPERLIPIDEMIA: ICD-10-CM

## 2022-03-16 DIAGNOSIS — M81.0 AGE-RELATED OSTEOPOROSIS WITHOUT CURRENT PATHOLOGICAL FRACTURE: ICD-10-CM

## 2022-03-16 DIAGNOSIS — J34.2 NASAL SEPTAL DEVIATION: ICD-10-CM

## 2022-03-16 LAB
ANION GAP SERPL CALCULATED.3IONS-SCNC: 9 MMOL/L (ref 5–18)
BUN SERPL-MCNC: 11 MG/DL (ref 8–22)
CALCIUM SERPL-MCNC: 9.6 MG/DL (ref 8.5–10.5)
CHLORIDE BLD-SCNC: 103 MMOL/L (ref 98–107)
CO2 SERPL-SCNC: 29 MMOL/L (ref 22–31)
CREAT SERPL-MCNC: 0.85 MG/DL (ref 0.7–1.3)
GFR SERPL CREATININE-BSD FRML MDRD: >90 ML/MIN/1.73M2
GLUCOSE BLD-MCNC: 97 MG/DL (ref 70–125)
HGB BLD-MCNC: 13.9 G/DL (ref 13.3–17.7)
POTASSIUM BLD-SCNC: 4.1 MMOL/L (ref 3.5–5)
SODIUM SERPL-SCNC: 141 MMOL/L (ref 136–145)

## 2022-03-16 PROCEDURE — 99214 OFFICE O/P EST MOD 30 MIN: CPT | Mod: 25 | Performed by: FAMILY MEDICINE

## 2022-03-16 PROCEDURE — 69209 REMOVE IMPACTED EAR WAX UNI: CPT | Performed by: FAMILY MEDICINE

## 2022-03-16 PROCEDURE — 80048 BASIC METABOLIC PNL TOTAL CA: CPT | Performed by: FAMILY MEDICINE

## 2022-03-16 PROCEDURE — 36415 COLL VENOUS BLD VENIPUNCTURE: CPT | Performed by: FAMILY MEDICINE

## 2022-03-16 PROCEDURE — 85018 HEMOGLOBIN: CPT | Performed by: FAMILY MEDICINE

## 2022-03-16 NOTE — PATIENT INSTRUCTIONS
Take atenolol with a small sip of water the morning of surgery.  Hold multivitamin and any NSAIDs including ibuprofen, naproxen, aspirin 1 week beforehand.  --------------  Preparing for Your Surgery  Getting started  A nurse will call you to review your health history and instructions. They will give you an arrival time based on your scheduled surgery time. Please be ready to share:    Your doctor's clinic name and phone number    Your medical, surgical and anesthesia history    A list of allergies and sensitivities    A list of medicines, including herbal treatments and over-the-counter drugs    Whether the patient has a legal guardian (ask how to send us the papers in advance)  Please tell us if you're pregnant--or if there's any chance you might be pregnant. Some surgeries may injure a fetus (unborn baby), so they require a pregnancy test. Surgeries that are safe for a fetus don't always need a test, and you can choose whether to have one.   If you have a child who's having surgery, please ask for a copy of Preparing for Your Child's Surgery.    Preparing for surgery    Within 30 days of surgery: Have a pre-op exam (sometimes called an H&P, or History and Physical). This can be done at a clinic or pre-operative center.  ? If you're having a , you may not need this exam. Talk to your care team.    At your pre-op exam, talk to your care team about all medicines you take. If you need to stop any medicines before surgery, ask when to start taking them again.  ? We do this for your safety. Many medicines can make you bleed too much during surgery. Some change how well surgery (anesthesia) drugs work.    Call your insurance company to let them know you're having surgery. (If you don't have insurance, call [phone].)    Call your clinic if there's any change in your health. This includes signs of a cold or flu (sore throat, runny nose, cough, rash, fever). It also includes a scrape or scratch near the  surgery site.    If you have questions on the day of surgery, call your hospital or surgery center.  COVID testing  You may need to be tested for COVID-19 before having surgery. If so, your surgical team will give you instructions for scheduling this test, separate from your preoperative history and physical.  Eating and drinking guidelines  For your safety: Unless your surgeon tells you otherwise, follow the guidelines below.    Eat and drink as usual until 8 hours before surgery. After that, no food or milk.    Drink clear liquids until 2 hours before surgery. These are liquids you can see through, like water, Gatorade and Propel Water. You may also have black coffee and tea (no cream or milk).    Nothing by mouth within 2 hours of surgery. This includes gum, candy and breath mints.    If you drink alcohol: Stop drinking it the night before surgery.    If your care team tells you to take medicine on the morning of surgery, it's okay to take it with a sip of water.  Preventing infection    Shower or bathe the night before and morning of your surgery. Follow the instructions your clinic gave you. (If no instructions, use regular soap.)    Don't shave or clip hair near your surgery site. We'll remove the hair if needed.    Don't smoke or vape the morning of surgery. You may chew nicotine gum up to 2 hours before surgery. A nicotine patch is okay.  ? Note: Some surgeries require you to completely quit smoking and nicotine. Check with your surgeon.    Your care team will make every effort to keep you safe from infection. We will:  ? Clean our hands often with soap and water (or an alcohol-based hand rub).  ? Clean the skin at your surgery site with a special soap that kills germs.  ? Give you a special gown to keep you warm. (Cold raises the risk of infection.)  ? Wear special hair covers, masks, gowns and gloves during surgery.  ? Give antibiotic medicine, if prescribed. Not all surgeries need antibiotics.  What to  bring on the day of surgery    Photo ID and insurance card    Copy of your health care directive, if you have one    Glasses and hearing aides (bring cases)  ? You can't wear contacts during surgery    Inhaler and eye drops, if you use them (tell us about these when you arrive)    CPAP machine or breathing device, if you use them    A few personal items, if spending the night    If you have . . .  ? A pacemaker, ICD (cardiac defibrillator) or other implant: Bring the ID card.  ? An implanted stimulator: Bring the remote control.  ? A legal guardian: Bring a copy of the certified (court-stamped) guardianship papers.  Please remove any jewelry, including body piercings. Leave jewelry and other valuables at home.  If you're going home the day of surgery    You must have a responsible adult drive you home. They should stay with you overnight as well.    If you don't have someone to stay with you, and you aren't safe to go home alone, we may keep you overnight. Insurance often won't pay for this.  After surgery  If it's hard to control your pain or you need more pain medicine, please call your surgeon's office.  Questions?   If you have any questions for your care team, list them here: _________________________________________________________________________________________________________________________________________________________________________ ____________________________________ ____________________________________ ____________________________________  For informational purposes only. Not to replace the advice of your health care provider. Copyright   2003, 2019 [facility]. All rights reserved. Clinically reviewed by Josefa Aragon MD. [employer] [id number] - REV 07/21.

## 2022-03-16 NOTE — PROGRESS NOTES
[facility]  [address] [facility] [address]  Phone: [phone]  Fax: [phone]  Primary Provider: Ariela Malcolm  Pre-op Performing Provider: MARC LANDA    PREOPERATIVE EVALUATION:  Today's date: 3/16/2022    Brennan Vazquez is a 57 year old male who presents for a preoperative evaluation.    Surgical Information:  Surgery/Procedure: SEPTOPLASTY, NOSE, WITH TURBINOPLASTY   Surgery Location: [facility]  Surgeon: Dr. Odonnell  Surgery Date: 3/29/22  Time of Surgery: tbd  Where patient plans to recover: At home with family  Fax number for surgical facility: Note does not need to be faxed, will be available electronically in Epic.    Type of Anesthesia Anticipated: General    Assessment & Plan     Preop general physical exam  Nasal septal deviation  Patient undergoing septoplasty due to septal deviation and restricted airflow of right nostril.  The proposed surgical procedure is considered INTERMEDIATE risk.    RECOMMENDATION:  APPROVAL GIVEN to proceed with proposed procedure, without further diagnostic evaluation.    - Hemoglobin  - Basic metabolic panel  (Ca, Cl, CO2, Creat, Gluc, K, Na, BUN)    Age-related osteoporosis without current pathological fracture  Currently on alendronate which he takes every Monday.    Mixed hyperlipidemia  Currently on simvastatin which she takes in the evening.  The 10-year ASCVD risk score (Yeimy DC Jr., et al., 2013) is: 8.1%    Values used to calculate the score:      Age: 57 years      Sex: Male      Is Non- : No      Diabetic: No      Tobacco smoker: No      Systolic Blood Pressure: 132 mmHg      Is BP treated: Yes      HDL Cholesterol: 53 mg/dL      Total Cholesterol: 224 mg/dL    Impacted cerumen of right ear  Water lavage performed by MA with successful cerumen removal.  - REMOVE IMPACTED CERUMEN      Subjective     HPI related to upcoming procedure:   Ongoing issue of breathing out of  right nostril.  Saw ENT and was noted to have a deviated septum.  Patient tends to feel congested in the right nostril and sinus area with excessive wax buildup.    Preop Questions 3/14/2022   1. Have you ever had a heart attack or stroke? No   2. Have you ever had surgery on your heart or blood vessels, such as a stent placement, a coronary artery bypass, or surgery on an artery in your head, neck, heart, or legs? No   3. Do you have chest pain with activity? No   4. Do you have a history of  heart failure? No   5. Do you currently have a cold, bronchitis or symptoms of other infection? No   6. Do you have a cough, shortness of breath, or wheezing? No   7. Do you or anyone in your family have previous history of blood clots? No   8. Do you or does anyone in your family have a serious bleeding problem such as prolonged bleeding following surgeries or cuts? No   9. Have you ever had problems with anemia or been told to take iron pills? No   10. Have you had any abnormal blood loss such as black, tarry or bloody stools? No   11. Have you ever had a blood transfusion? No   12. Are you willing to have a blood transfusion if it is medically needed before, during, or after your surgery? Yes   13. Have you or any of your relatives ever had problems with anesthesia? No   14. Do you have sleep apnea, excessive snoring or daytime drowsiness? No   15. Do you have any artifical heart valves or other implanted medical devices like a pacemaker, defibrillator, or continuous glucose monitor? No   16. Do you have artificial joints? YES - bilateral total hips   17. Are you allergic to latex? No       Health Care Directive:  Patient does not have a Health Care Directive or Living Will: Discussed advance care planning with patient; however, patient declined at this time.    Preoperative Review of :   reviewed - no record of controlled substances prescribed.    Review of Systems  CONSTITUTIONAL: NEGATIVE for fever, chills, change  in weight  INTEGUMENTARY/SKIN: NEGATIVE for worrisome rashes, moles or lesions  EYES: NEGATIVE for vision changes or irritation  ENT/MOUTH: Positive for nasal septal deviation with decreased airflow  RESP: NEGATIVE for significant cough or SOB  CV: NEGATIVE for chest pain, palpitations or peripheral edema  GI: NEGATIVE for nausea, abdominal pain, heartburn, or change in bowel habits  : NEGATIVE for frequency, dysuria, or hematuria  MUSCULOSKELETAL: NEGATIVE for significant arthralgias or myalgia  NEURO: NEGATIVE for weakness, dizziness or paresthesias  ENDOCRINE: NEGATIVE for temperature intolerance, skin/hair changes  HEME: NEGATIVE for bleeding problems  PSYCHIATRIC: NEGATIVE for changes in mood or affect    Patient Active Problem List    Diagnosis Date Noted     Left inguinal hernia 03/04/2022     Priority: Medium     Added automatically from request for surgery [id number]       Nasal septal deviation 11/19/2021     Priority: Medium     Added automatically from request for surgery [id number]       Nasal turbinate hypertrophy 11/19/2021     Priority: Medium     Added automatically from request for surgery [id number]       Chronic rhinitis 11/19/2021     Priority: Medium     Added automatically from request for surgery [id number]       Primary osteoarthritis of left hip 06/29/2021     Priority: Medium     Age-related osteoporosis without current pathological fracture      Priority: Medium     Created by Conversion  Replacement Utility updated for latest IMO load         Hyperlipidemia LDL goal <100      Priority: Medium     Created by Conversion         Colon polyp 2017 10/15/2017     Priority: Medium     Every 5 years          Seasonal allergies 06/05/2017     Priority: Medium     Insomnia 06/05/2017     Priority: Medium     Difficulty staying asleep          Multiple lipomas 11/17/2016     Priority: Medium     Osteopenia      Priority: Medium     Created by Conversion  Replacement Utility updated for latest IMO load          Anxiety      Priority: Medium     Created by Conversion  Replacement Utility updated for latest IMO load         Hypertension      Priority: Medium     Created by Conversion  Replacement Utility updated for latest IMO load         Hyperlipidemia      Priority: Medium     Created by Conversion         Rosacea      Priority: Medium     Created by Conversion         Restless Legs Syndrome      Priority: Medium     Created by Conversion          Past Medical History:   Diagnosis Date     Anxiety      HLD (hyperlipidemia)      HTN (hypertension)      Osteoarthritis of left hip 6/5/2017     Osteopenia      Past Surgical History:   Procedure Laterality Date     ORTHOPEDIC SURGERY  08/07/2017, 07/15/2021    L Hip Rplacement, R Hip replacement     TOTAL HIP ARTHROPLASTY Left 08/2017    [facility]      Current Outpatient Medications   Medication Sig Dispense Refill     alendronate (FOSAMAX) 70 MG tablet TAKE 1 TABLET BY MOUTH EVERY 7 DAYS IN THE MORNING AND 30 MINUTES BEFORE FOOD ON AN EMPTY STOMACH AND WITH A FULL GLASS OF WATER 12 tablet 4     amoxicillin (AMOXIL) 500 MG tablet TAKE 4 TABLETS BY MOUTH 1 HOUR BEFORE DENTAL APPOINTMENT       atenoloL (TENORMIN) 25 MG tablet [ATENOLOL (TENORMIN) 25 MG TABLET] TAKE 1 TABLET(25 MG) BY MOUTH DAILY 90 tablet 3     azelastine (ASTELIN) 0.1 % nasal spray Spray 1 spray into both nostrils 2 times daily 30 mL 11     calcium carbonate-vitamin D3 (CALTRATE 600 PLUS D3) 600 mg(1,500mg) -400 unit per tablet [CALCIUM CARBONATE-VITAMIN D3 (CALTRATE 600 PLUS D3) 600 MG(1,500MG) -400 UNIT PER TABLET] Take 1 tablet by mouth daily.       multivitamin therapeutic tablet [MULTIVITAMIN THERAPEUTIC TABLET] Take 1 tablet by mouth daily.       simvastatin (ZOCOR) 10 MG tablet Take 1 tablet (10 mg) by mouth At Bedtime 90 tablet 4     vitamin B-12 (CYANOCOBALAMIN) 100 MCG tablet Take 1,000 mcg by mouth daily       zolpidem (AMBIEN) 10 MG tablet TAKE 1 TABLET(10 MG) BY MOUTH AT BEDTIME AS NEEDED  "FOR SLEEP 30 tablet 3       No Known Allergies     Social History     Tobacco Use     Smoking status: Never Smoker     Smokeless tobacco: Former User   Substance Use Topics     Alcohol use: Yes     Comment: 3-7 beers a week     Family History   Problem Relation Age of Onset     Hypertension Mother      Hyperlipidemia Mother      Hypertension Maternal Aunt      Hypertension Maternal Uncle      Hypertension Maternal Grandmother      Breast Cancer Maternal Grandmother      Hypertension Maternal Grandfather      History   Drug Use No         Objective     /75   Pulse 57   Temp 97.6  F (36.4  C) (Oral)   Ht 1.842 m (6' 0.5\")   Wt 83 kg (183 lb)   BMI 24.48 kg/m      Physical Exam    GENERAL APPEARANCE: healthy, alert and no distress     EYES: EOMI,  PERRL     HENT: Right cerumen impaction, nasal septal deviation and mouth without ulcers or lesions     NECK: no adenopathy, no asymmetry, masses, or scars and thyroid normal to palpation     RESP: lungs clear to auscultation - no rales, rhonchi or wheezes     CV: regular rates and rhythm, normal S1 S2, no S3 or S4 and no murmur, click or rub     ABDOMEN:  soft, nontender     MS: extremities normal- no gross deformities noted, no evidence of inflammation in joints, FROM in all extremities.     SKIN: no suspicious lesions or rashes     NEURO: Normal strength and tone, sensory exam grossly normal, mentation intact and speech normal     PSYCH: mentation appears normal. and affect normal/bright     LYMPHATICS: No cervical adenopathy    Recent Labs   Lab Test 09/03/21  1321 06/29/21  1014   HGB 13.5 14.0    178    140   POTASSIUM 4.2 3.9   CR 0.82 0.80        Diagnostics:  Labs pending at this time.  Results will be reviewed when available.   No EKG required, no history of coronary heart disease, significant arrhythmia, peripheral arterial disease or other structural heart disease.    Revised Cardiac Risk Index (RCRI):  The patient has the following serious " cardiovascular risks for perioperative complications:   - No serious cardiac risks = 0 points     RCRI Interpretation: 0 points: Class I (very low risk - 0.4% complication rate)           Signed Electronically by: Milagro Martinez DO  Copy of this evaluation report is provided to requesting physician.

## 2022-03-16 NOTE — H&P (VIEW-ONLY)
[facility]  [address] [facility] [address]  Phone: [phone]  Fax: [phone]  Primary Provider: Ariela Malcolm  Pre-op Performing Provider: MARC LANDA    PREOPERATIVE EVALUATION:  Today's date: 3/16/2022    Brennan Vazquez is a 57 year old male who presents for a preoperative evaluation.    Surgical Information:  Surgery/Procedure: SEPTOPLASTY, NOSE, WITH TURBINOPLASTY   Surgery Location: [facility]  Surgeon: Dr. Odonnell  Surgery Date: 3/29/22  Time of Surgery: tbd  Where patient plans to recover: At home with family  Fax number for surgical facility: Note does not need to be faxed, will be available electronically in Epic.    Type of Anesthesia Anticipated: General    Assessment & Plan     Preop general physical exam  Nasal septal deviation  Patient undergoing septoplasty due to septal deviation and restricted airflow of right nostril.  The proposed surgical procedure is considered INTERMEDIATE risk.    RECOMMENDATION:  APPROVAL GIVEN to proceed with proposed procedure, without further diagnostic evaluation.    - Hemoglobin  - Basic metabolic panel  (Ca, Cl, CO2, Creat, Gluc, K, Na, BUN)    Age-related osteoporosis without current pathological fracture  Currently on alendronate which he takes every Monday.    Mixed hyperlipidemia  Currently on simvastatin which she takes in the evening.  The 10-year ASCVD risk score (Yeimy DC Jr., et al., 2013) is: 8.1%    Values used to calculate the score:      Age: 57 years      Sex: Male      Is Non- : No      Diabetic: No      Tobacco smoker: No      Systolic Blood Pressure: 132 mmHg      Is BP treated: Yes      HDL Cholesterol: 53 mg/dL      Total Cholesterol: 224 mg/dL    Impacted cerumen of right ear  Water lavage performed by MA with successful cerumen removal.  - REMOVE IMPACTED CERUMEN      Subjective     HPI related to upcoming procedure:   Ongoing issue of breathing out of  right nostril.  Saw ENT and was noted to have a deviated septum.  Patient tends to feel congested in the right nostril and sinus area with excessive wax buildup.    Preop Questions 3/14/2022   1. Have you ever had a heart attack or stroke? No   2. Have you ever had surgery on your heart or blood vessels, such as a stent placement, a coronary artery bypass, or surgery on an artery in your head, neck, heart, or legs? No   3. Do you have chest pain with activity? No   4. Do you have a history of  heart failure? No   5. Do you currently have a cold, bronchitis or symptoms of other infection? No   6. Do you have a cough, shortness of breath, or wheezing? No   7. Do you or anyone in your family have previous history of blood clots? No   8. Do you or does anyone in your family have a serious bleeding problem such as prolonged bleeding following surgeries or cuts? No   9. Have you ever had problems with anemia or been told to take iron pills? No   10. Have you had any abnormal blood loss such as black, tarry or bloody stools? No   11. Have you ever had a blood transfusion? No   12. Are you willing to have a blood transfusion if it is medically needed before, during, or after your surgery? Yes   13. Have you or any of your relatives ever had problems with anesthesia? No   14. Do you have sleep apnea, excessive snoring or daytime drowsiness? No   15. Do you have any artifical heart valves or other implanted medical devices like a pacemaker, defibrillator, or continuous glucose monitor? No   16. Do you have artificial joints? YES - bilateral total hips   17. Are you allergic to latex? No       Health Care Directive:  Patient does not have a Health Care Directive or Living Will: Discussed advance care planning with patient; however, patient declined at this time.    Preoperative Review of :   reviewed - no record of controlled substances prescribed.    Review of Systems  CONSTITUTIONAL: NEGATIVE for fever, chills, change  in weight  INTEGUMENTARY/SKIN: NEGATIVE for worrisome rashes, moles or lesions  EYES: NEGATIVE for vision changes or irritation  ENT/MOUTH: Positive for nasal septal deviation with decreased airflow  RESP: NEGATIVE for significant cough or SOB  CV: NEGATIVE for chest pain, palpitations or peripheral edema  GI: NEGATIVE for nausea, abdominal pain, heartburn, or change in bowel habits  : NEGATIVE for frequency, dysuria, or hematuria  MUSCULOSKELETAL: NEGATIVE for significant arthralgias or myalgia  NEURO: NEGATIVE for weakness, dizziness or paresthesias  ENDOCRINE: NEGATIVE for temperature intolerance, skin/hair changes  HEME: NEGATIVE for bleeding problems  PSYCHIATRIC: NEGATIVE for changes in mood or affect    Patient Active Problem List    Diagnosis Date Noted     Left inguinal hernia 03/04/2022     Priority: Medium     Added automatically from request for surgery [id number]       Nasal septal deviation 11/19/2021     Priority: Medium     Added automatically from request for surgery [id number]       Nasal turbinate hypertrophy 11/19/2021     Priority: Medium     Added automatically from request for surgery [id number]       Chronic rhinitis 11/19/2021     Priority: Medium     Added automatically from request for surgery [id number]       Primary osteoarthritis of left hip 06/29/2021     Priority: Medium     Age-related osteoporosis without current pathological fracture      Priority: Medium     Created by Conversion  Replacement Utility updated for latest IMO load         Hyperlipidemia LDL goal <100      Priority: Medium     Created by Conversion         Colon polyp 2017 10/15/2017     Priority: Medium     Every 5 years          Seasonal allergies 06/05/2017     Priority: Medium     Insomnia 06/05/2017     Priority: Medium     Difficulty staying asleep          Multiple lipomas 11/17/2016     Priority: Medium     Osteopenia      Priority: Medium     Created by Conversion  Replacement Utility updated for latest IMO load          Anxiety      Priority: Medium     Created by Conversion  Replacement Utility updated for latest IMO load         Hypertension      Priority: Medium     Created by Conversion  Replacement Utility updated for latest IMO load         Hyperlipidemia      Priority: Medium     Created by Conversion         Rosacea      Priority: Medium     Created by Conversion         Restless Legs Syndrome      Priority: Medium     Created by Conversion          Past Medical History:   Diagnosis Date     Anxiety      HLD (hyperlipidemia)      HTN (hypertension)      Osteoarthritis of left hip 6/5/2017     Osteopenia      Past Surgical History:   Procedure Laterality Date     ORTHOPEDIC SURGERY  08/07/2017, 07/15/2021    L Hip Rplacement, R Hip replacement     TOTAL HIP ARTHROPLASTY Left 08/2017    [facility]      Current Outpatient Medications   Medication Sig Dispense Refill     alendronate (FOSAMAX) 70 MG tablet TAKE 1 TABLET BY MOUTH EVERY 7 DAYS IN THE MORNING AND 30 MINUTES BEFORE FOOD ON AN EMPTY STOMACH AND WITH A FULL GLASS OF WATER 12 tablet 4     amoxicillin (AMOXIL) 500 MG tablet TAKE 4 TABLETS BY MOUTH 1 HOUR BEFORE DENTAL APPOINTMENT       atenoloL (TENORMIN) 25 MG tablet [ATENOLOL (TENORMIN) 25 MG TABLET] TAKE 1 TABLET(25 MG) BY MOUTH DAILY 90 tablet 3     azelastine (ASTELIN) 0.1 % nasal spray Spray 1 spray into both nostrils 2 times daily 30 mL 11     calcium carbonate-vitamin D3 (CALTRATE 600 PLUS D3) 600 mg(1,500mg) -400 unit per tablet [CALCIUM CARBONATE-VITAMIN D3 (CALTRATE 600 PLUS D3) 600 MG(1,500MG) -400 UNIT PER TABLET] Take 1 tablet by mouth daily.       multivitamin therapeutic tablet [MULTIVITAMIN THERAPEUTIC TABLET] Take 1 tablet by mouth daily.       simvastatin (ZOCOR) 10 MG tablet Take 1 tablet (10 mg) by mouth At Bedtime 90 tablet 4     vitamin B-12 (CYANOCOBALAMIN) 100 MCG tablet Take 1,000 mcg by mouth daily       zolpidem (AMBIEN) 10 MG tablet TAKE 1 TABLET(10 MG) BY MOUTH AT BEDTIME AS NEEDED  "FOR SLEEP 30 tablet 3       No Known Allergies     Social History     Tobacco Use     Smoking status: Never Smoker     Smokeless tobacco: Former User   Substance Use Topics     Alcohol use: Yes     Comment: 3-7 beers a week     Family History   Problem Relation Age of Onset     Hypertension Mother      Hyperlipidemia Mother      Hypertension Maternal Aunt      Hypertension Maternal Uncle      Hypertension Maternal Grandmother      Breast Cancer Maternal Grandmother      Hypertension Maternal Grandfather      History   Drug Use No         Objective     /75   Pulse 57   Temp 97.6  F (36.4  C) (Oral)   Ht 1.842 m (6' 0.5\")   Wt 83 kg (183 lb)   BMI 24.48 kg/m      Physical Exam    GENERAL APPEARANCE: healthy, alert and no distress     EYES: EOMI,  PERRL     HENT: Right cerumen impaction, nasal septal deviation and mouth without ulcers or lesions     NECK: no adenopathy, no asymmetry, masses, or scars and thyroid normal to palpation     RESP: lungs clear to auscultation - no rales, rhonchi or wheezes     CV: regular rates and rhythm, normal S1 S2, no S3 or S4 and no murmur, click or rub     ABDOMEN:  soft, nontender     MS: extremities normal- no gross deformities noted, no evidence of inflammation in joints, FROM in all extremities.     SKIN: no suspicious lesions or rashes     NEURO: Normal strength and tone, sensory exam grossly normal, mentation intact and speech normal     PSYCH: mentation appears normal. and affect normal/bright     LYMPHATICS: No cervical adenopathy    Recent Labs   Lab Test 09/03/21  1321 06/29/21  1014   HGB 13.5 14.0    178    140   POTASSIUM 4.2 3.9   CR 0.82 0.80        Diagnostics:  Labs pending at this time.  Results will be reviewed when available.   No EKG required, no history of coronary heart disease, significant arrhythmia, peripheral arterial disease or other structural heart disease.    Revised Cardiac Risk Index (RCRI):  The patient has the following serious " cardiovascular risks for perioperative complications:   - No serious cardiac risks = 0 points     RCRI Interpretation: 0 points: Class I (very low risk - 0.4% complication rate)           Signed Electronically by: Milagro Martinez DO  Copy of this evaluation report is provided to requesting physician.

## 2022-03-24 RX ORDER — ASPIRIN 81 MG/1
81 TABLET ORAL DAILY
COMMUNITY
End: 2024-09-06

## 2022-03-25 ENCOUNTER — LAB (OUTPATIENT)
Dept: LAB | Facility: CLINIC | Age: 57
End: 2022-03-25
Payer: COMMERCIAL

## 2022-03-25 DIAGNOSIS — Z11.59 ENCOUNTER FOR SCREENING FOR OTHER VIRAL DISEASES: ICD-10-CM

## 2022-03-25 LAB — SARS-COV-2 RNA RESP QL NAA+PROBE: NEGATIVE

## 2022-03-25 PROCEDURE — U0005 INFEC AGEN DETEC AMPLI PROBE: HCPCS

## 2022-03-25 PROCEDURE — U0003 INFECTIOUS AGENT DETECTION BY NUCLEIC ACID (DNA OR RNA); SEVERE ACUTE RESPIRATORY SYNDROME CORONAVIRUS 2 (SARS-COV-2) (CORONAVIRUS DISEASE [COVID-19]), AMPLIFIED PROBE TECHNIQUE, MAKING USE OF HIGH THROUGHPUT TECHNOLOGIES AS DESCRIBED BY CMS-2020-01-R: HCPCS

## 2022-03-28 ENCOUNTER — ANESTHESIA EVENT (OUTPATIENT)
Dept: SURGERY | Facility: AMBULATORY SURGERY CENTER | Age: 57
End: 2022-03-28
Payer: COMMERCIAL

## 2022-03-29 ENCOUNTER — HOSPITAL ENCOUNTER (OUTPATIENT)
Facility: AMBULATORY SURGERY CENTER | Age: 57
Discharge: HOME | End: 2022-03-29
Attending: OTOLARYNGOLOGY
Payer: COMMERCIAL

## 2022-03-29 ENCOUNTER — ANESTHESIA (OUTPATIENT)
Dept: SURGERY | Facility: AMBULATORY SURGERY CENTER | Age: 57
End: 2022-03-29
Payer: COMMERCIAL

## 2022-03-29 VITALS
DIASTOLIC BLOOD PRESSURE: 90 MMHG | SYSTOLIC BLOOD PRESSURE: 167 MMHG | WEIGHT: 183 LBS | BODY MASS INDEX: 24.79 KG/M2 | HEART RATE: 70 BPM | OXYGEN SATURATION: 96 % | RESPIRATION RATE: 16 BRPM | HEIGHT: 72 IN | TEMPERATURE: 97.4 F

## 2022-03-29 DIAGNOSIS — J34.3 NASAL TURBINATE HYPERTROPHY: ICD-10-CM

## 2022-03-29 DIAGNOSIS — J31.0 CHRONIC RHINITIS: ICD-10-CM

## 2022-03-29 DIAGNOSIS — J34.2 NASAL SEPTAL DEVIATION: ICD-10-CM

## 2022-03-29 DIAGNOSIS — Z98.890 S/P NASAL SEPTOPLASTY: Primary | ICD-10-CM

## 2022-03-29 PROCEDURE — 30117 REMOVAL OF INTRANASAL LESION: CPT | Mod: 51 | Performed by: OTOLARYNGOLOGY

## 2022-03-29 PROCEDURE — 30999 UNLISTED PROCEDURE NOSE: CPT | Performed by: OTOLARYNGOLOGY

## 2022-03-29 PROCEDURE — 30802 ABLATE INF TURBINATE SUBMUC: CPT | Mod: 51 | Performed by: OTOLARYNGOLOGY

## 2022-03-29 RX ORDER — MUPIROCIN 20 MG/G
OINTMENT TOPICAL
Qty: 22 G | Refills: 0 | Status: SHIPPED | OUTPATIENT
Start: 2022-03-29 | End: 2023-01-24

## 2022-03-29 RX ORDER — MAGNESIUM SULFATE HEPTAHYDRATE 40 MG/ML
4 INJECTION, SOLUTION INTRAVENOUS ONCE
Status: COMPLETED | OUTPATIENT
Start: 2022-03-29 | End: 2022-03-29

## 2022-03-29 RX ORDER — OXYCODONE HYDROCHLORIDE 5 MG/1
5 TABLET ORAL EVERY 4 HOURS PRN
Status: DISCONTINUED | OUTPATIENT
Start: 2022-03-29 | End: 2022-03-30 | Stop reason: HOSPADM

## 2022-03-29 RX ORDER — FENTANYL CITRATE 0.05 MG/ML
25 INJECTION, SOLUTION INTRAMUSCULAR; INTRAVENOUS
Status: DISCONTINUED | OUTPATIENT
Start: 2022-03-29 | End: 2022-03-30 | Stop reason: HOSPADM

## 2022-03-29 RX ORDER — HYDROCODONE BITARTRATE AND ACETAMINOPHEN 5; 325 MG/1; MG/1
1-2 TABLET ORAL ONCE
Status: DISCONTINUED | OUTPATIENT
Start: 2022-03-29 | End: 2022-03-30 | Stop reason: HOSPADM

## 2022-03-29 RX ORDER — MAGNESIUM HYDROXIDE 1200 MG/15ML
LIQUID ORAL PRN
Status: DISCONTINUED | OUTPATIENT
Start: 2022-03-29 | End: 2022-03-29 | Stop reason: HOSPADM

## 2022-03-29 RX ORDER — SODIUM CHLORIDE, SODIUM LACTATE, POTASSIUM CHLORIDE, CALCIUM CHLORIDE 600; 310; 30; 20 MG/100ML; MG/100ML; MG/100ML; MG/100ML
INJECTION, SOLUTION INTRAVENOUS CONTINUOUS
Status: DISCONTINUED | OUTPATIENT
Start: 2022-03-29 | End: 2022-03-30 | Stop reason: HOSPADM

## 2022-03-29 RX ORDER — OXYMETAZOLINE HYDROCHLORIDE 0.05 G/100ML
2 SPRAY NASAL ONCE
Status: COMPLETED | OUTPATIENT
Start: 2022-03-29 | End: 2022-03-29

## 2022-03-29 RX ORDER — HYDROMORPHONE HCL IN WATER/PF 6 MG/30 ML
0.2 PATIENT CONTROLLED ANALGESIA SYRINGE INTRAVENOUS EVERY 5 MIN PRN
Status: DISCONTINUED | OUTPATIENT
Start: 2022-03-29 | End: 2022-03-30 | Stop reason: HOSPADM

## 2022-03-29 RX ORDER — ONDANSETRON 4 MG/1
4 TABLET, ORALLY DISINTEGRATING ORAL EVERY 30 MIN PRN
Status: DISCONTINUED | OUTPATIENT
Start: 2022-03-29 | End: 2022-03-30 | Stop reason: HOSPADM

## 2022-03-29 RX ORDER — SOD CHLOR,BICARB/SQUEEZ BOTTLE
PACKET, WITH RINSE DEVICE NASAL
Qty: 1 EACH | Refills: 0 | Status: SHIPPED | OUTPATIENT
Start: 2022-03-29 | End: 2023-07-31

## 2022-03-29 RX ORDER — ACETAMINOPHEN 325 MG/1
975 TABLET ORAL ONCE
Status: COMPLETED | OUTPATIENT
Start: 2022-03-29 | End: 2022-03-29

## 2022-03-29 RX ORDER — FENTANYL CITRATE 0.05 MG/ML
25 INJECTION, SOLUTION INTRAMUSCULAR; INTRAVENOUS EVERY 5 MIN PRN
Status: DISCONTINUED | OUTPATIENT
Start: 2022-03-29 | End: 2022-03-30 | Stop reason: HOSPADM

## 2022-03-29 RX ORDER — ACETAMINOPHEN 325 MG/1
650 TABLET ORAL ONCE
Status: DISCONTINUED | OUTPATIENT
Start: 2022-03-29 | End: 2022-03-30 | Stop reason: HOSPADM

## 2022-03-29 RX ORDER — MEPERIDINE HYDROCHLORIDE 25 MG/ML
12.5 INJECTION INTRAMUSCULAR; INTRAVENOUS; SUBCUTANEOUS
Status: DISCONTINUED | OUTPATIENT
Start: 2022-03-29 | End: 2022-03-30 | Stop reason: HOSPADM

## 2022-03-29 RX ORDER — ONDANSETRON 2 MG/ML
INJECTION INTRAMUSCULAR; INTRAVENOUS PRN
Status: DISCONTINUED | OUTPATIENT
Start: 2022-03-29 | End: 2022-03-29

## 2022-03-29 RX ORDER — HYDROCODONE BITARTRATE AND ACETAMINOPHEN 5; 325 MG/1; MG/1
1-2 TABLET ORAL EVERY 4 HOURS PRN
Qty: 12 TABLET | Refills: 0 | Status: SHIPPED | OUTPATIENT
Start: 2022-03-29 | End: 2022-04-11

## 2022-03-29 RX ORDER — LIDOCAINE 40 MG/G
CREAM TOPICAL
Status: DISCONTINUED | OUTPATIENT
Start: 2022-03-29 | End: 2022-03-30 | Stop reason: HOSPADM

## 2022-03-29 RX ORDER — FENTANYL CITRATE 50 UG/ML
INJECTION, SOLUTION INTRAMUSCULAR; INTRAVENOUS PRN
Status: DISCONTINUED | OUTPATIENT
Start: 2022-03-29 | End: 2022-03-29

## 2022-03-29 RX ORDER — LIDOCAINE HYDROCHLORIDE AND EPINEPHRINE 10; 10 MG/ML; UG/ML
INJECTION, SOLUTION INFILTRATION; PERINEURAL PRN
Status: DISCONTINUED | OUTPATIENT
Start: 2022-03-29 | End: 2022-03-29 | Stop reason: HOSPADM

## 2022-03-29 RX ORDER — PROPOFOL 10 MG/ML
INJECTION, EMULSION INTRAVENOUS PRN
Status: DISCONTINUED | OUTPATIENT
Start: 2022-03-29 | End: 2022-03-29

## 2022-03-29 RX ORDER — OXYMETAZOLINE HYDROCHLORIDE 0.05 G/100ML
SPRAY NASAL PRN
Status: DISCONTINUED | OUTPATIENT
Start: 2022-03-29 | End: 2022-03-29 | Stop reason: HOSPADM

## 2022-03-29 RX ORDER — ONDANSETRON 2 MG/ML
4 INJECTION INTRAMUSCULAR; INTRAVENOUS EVERY 30 MIN PRN
Status: DISCONTINUED | OUTPATIENT
Start: 2022-03-29 | End: 2022-03-30 | Stop reason: HOSPADM

## 2022-03-29 RX ORDER — DEXAMETHASONE SODIUM PHOSPHATE 4 MG/ML
INJECTION, SOLUTION INTRA-ARTICULAR; INTRALESIONAL; INTRAMUSCULAR; INTRAVENOUS; SOFT TISSUE PRN
Status: DISCONTINUED | OUTPATIENT
Start: 2022-03-29 | End: 2022-03-29

## 2022-03-29 RX ORDER — SODIUM CHLORIDE, SODIUM LACTATE, POTASSIUM CHLORIDE, CALCIUM CHLORIDE 600; 310; 30; 20 MG/100ML; MG/100ML; MG/100ML; MG/100ML
INJECTION, SOLUTION INTRAVENOUS CONTINUOUS PRN
Status: DISCONTINUED | OUTPATIENT
Start: 2022-03-29 | End: 2022-03-29

## 2022-03-29 RX ADMIN — MAGNESIUM SULFATE HEPTAHYDRATE 4 G: 40 INJECTION, SOLUTION INTRAVENOUS at 13:44

## 2022-03-29 RX ADMIN — OXYCODONE HYDROCHLORIDE 5 MG: 5 TABLET ORAL at 16:07

## 2022-03-29 RX ADMIN — FENTANYL CITRATE 50 MCG: 50 INJECTION, SOLUTION INTRAMUSCULAR; INTRAVENOUS at 15:05

## 2022-03-29 RX ADMIN — ONDANSETRON 4 MG: 2 INJECTION INTRAMUSCULAR; INTRAVENOUS at 14:34

## 2022-03-29 RX ADMIN — FENTANYL CITRATE 25 MCG: 0.05 INJECTION, SOLUTION INTRAMUSCULAR; INTRAVENOUS at 16:05

## 2022-03-29 RX ADMIN — OXYMETAZOLINE HYDROCHLORIDE 2 SPRAY: 0.05 SPRAY NASAL at 13:39

## 2022-03-29 RX ADMIN — SODIUM CHLORIDE, SODIUM LACTATE, POTASSIUM CHLORIDE, CALCIUM CHLORIDE: 600; 310; 30; 20 INJECTION, SOLUTION INTRAVENOUS at 13:44

## 2022-03-29 RX ADMIN — DEXAMETHASONE SODIUM PHOSPHATE 8 MG: 4 INJECTION, SOLUTION INTRA-ARTICULAR; INTRALESIONAL; INTRAMUSCULAR; INTRAVENOUS; SOFT TISSUE at 14:34

## 2022-03-29 RX ADMIN — PROPOFOL 200 MG: 10 INJECTION, EMULSION INTRAVENOUS at 14:34

## 2022-03-29 RX ADMIN — FENTANYL CITRATE 100 MCG: 50 INJECTION, SOLUTION INTRAMUSCULAR; INTRAVENOUS at 14:34

## 2022-03-29 RX ADMIN — FENTANYL CITRATE 25 MCG: 0.05 INJECTION, SOLUTION INTRAMUSCULAR; INTRAVENOUS at 16:35

## 2022-03-29 RX ADMIN — ACETAMINOPHEN 975 MG: 325 TABLET ORAL at 13:37

## 2022-03-29 RX ADMIN — SODIUM CHLORIDE, SODIUM LACTATE, POTASSIUM CHLORIDE, CALCIUM CHLORIDE: 600; 310; 30; 20 INJECTION, SOLUTION INTRAVENOUS at 14:31

## 2022-03-29 RX ADMIN — PROPOFOL 30 MG: 10 INJECTION, EMULSION INTRAVENOUS at 15:02

## 2022-03-29 RX ADMIN — PROPOFOL 50 MG: 10 INJECTION, EMULSION INTRAVENOUS at 14:54

## 2022-03-29 RX ADMIN — Medication 40 MG: at 14:34

## 2022-03-29 NOTE — ANESTHESIA POSTPROCEDURE EVALUATION
Patient: Brennan Vazquez    Procedure: Procedure(s):  SEPTOPLASTY, NOSE, WITH TURBINOPLASTY [place] assisted with cryoabation of posterior nasal tissue       Anesthesia Type:  General    Note:  Disposition: Outpatient   Postop Pain Control: Uneventful            Sign Out: Well controlled pain   PONV: No   Neuro/Psych: Uneventful            Sign Out: Acceptable/Baseline neuro status   Airway/Respiratory: Uneventful            Sign Out: Acceptable/Baseline resp. status   CV/Hemodynamics: Uneventful            Sign Out: Acceptable CV status; No obvious hypovolemia; No obvious fluid overload   Other NRE: NONE   DID A NON-ROUTINE EVENT OCCUR? No           Last vitals:  Vitals Value Taken Time   /76 03/29/22 1545   Temp 97.4  F (36.3  C) 03/29/22 1531   Pulse 78 03/29/22 1546   Resp 17 03/29/22 1545   SpO2 96 % 03/29/22 1546   Vitals shown include unvalidated device data.    Electronically Signed By: Sukhwinder Jean Baptiste MD  March 29, 2022  3:57 PM

## 2022-03-29 NOTE — OP NOTE
Otolaryngology Operative Note    Date of Operation:  03/29/22    Pre-operative Diagnosis:  1. Nasal Septal Deviation  2. Inferior turbinate Hypertrophy 3 Chronic Post Nasal Drip  Post-operative Diagnosis:  same  Procedure(s):  1.  Nasal Septoplasty with Nasal Endoscopic guidance 2. Outfracture of inferior turbinates 3. Cryoablation of posterior nasal tissue    Surgeon:  Flavio Odonnell MD  Assistant(s):  None  Anesthesia:  General     Indications for Procedure:  The risks and the benefits of the procedure were discussed with the patient. A consent form was then signed and placed into the chart.    Procedure in Detail:  The patient was brought into the operating room and placed on the table in a supine position. Anesthesia intubated without any difficulties. A time out was performed with all pertinent personnel in the room. The bed was then rotated 90 degrees.      The nasal septum is injected with 1.5 ml of 1% Lidocaine with Epinephrine at 1:1000,000.   Afrin soaked nasal pledgets were then placed bilaterally     After 5 minutes, the pledgets are removed.        First,  radiofrequency reduction of both inferior turbinates was performed using a Reflex Ultra PRT wand at 6/2.  High and Low 30 second passes made on each side.     Next using the septal balloon it was positioned along the floor of the nasal cavity on the  RIGHT side inflated up to 8atm  with good effect.  This had the effect of straightening the septum as well as outfracture the inferior turbinate on the right.   Using a [place] elevator, the inferior turbinate on the left was outfractured.     Next cyroablation of posterior nasal tissue was accomplished at the base of each middle turbinate for 30 seconds, with additional treatment immediately anterior and inferior to the original site.       An OG tube was then passed to clear secretions from the hypopharynx.     The patient tolerated the procedure well without incident.     Findings:  Right septal bony  deflection (spur); Inferior turbinate hypertrophy      EBL:  Approximately  15 mL    Complications:  none    Disposition: The patient was extubated in the operating room and was transferred to the PACU in stable condition.       Flavio Odonnell MD

## 2022-03-29 NOTE — ANESTHESIA PROCEDURE NOTES
Airway       Patient location during procedure: OR       Procedure Start/Stop Times: 3/29/2022 2:36 PM  Staff -        Anesthesiologist:  Sukhwinder Jean Baptiste MD       CRNA: Duran Rivas APRN CRNA       Performed By: CRNA  Consent for Airway        Urgency: elective  Indications and Patient Condition       Indications for airway management: miguel angel-procedural       Induction type:intravenous       Mask difficulty assessment: 2 - vent by mask + OA or adjuvant +/- NMBA    Final Airway Details       Final airway type: endotracheal airway       Successful airway: TRACI  Endotracheal Airway Details        ETT size (mm): 7.5       Cuffed: yes       Cuff volume (mL): 8       Successful intubation technique: direct laryngoscopy       DL Blade Type: Hutchison 2       Grade View of Cords: 1       Adjucts: stylet and tooth guard       Position: Center       Measured from: lips (at bend)       Bite block used: None    Post intubation assessment        Placement verified by: capnometry, equal breath sounds and chest rise        Number of attempts at approach: 1       Number of other approaches attempted: 0       Secured with: silk tape       Ease of procedure: easy       Dentition: Intact and Unchanged

## 2022-03-29 NOTE — ANESTHESIA CARE TRANSFER NOTE
Patient: Brennan Vazquez    Procedure: Procedure(s):  SEPTOPLASTY, NOSE, WITH TURBINOPLASTY Ballon assisted with cryoabation of posterior nasal tissue       Diagnosis: Nasal septal deviation [J34.2]  Nasal turbinate hypertrophy [J34.3]  Chronic rhinitis [J31.0]  Diagnosis Additional Information: No value filed.    Anesthesia Type:   General     Note:    Oropharynx: oropharynx clear of all foreign objects and spontaneously breathing  Level of Consciousness: drowsy  Oxygen Supplementation: face mask  Level of Supplemental Oxygen (L/min / FiO2): 8  Independent Airway: airway patency satisfactory and stable  Dentition: dentition unchanged  Vital Signs Stable: post-procedure vital signs reviewed and stable  Report to RN Given: handoff report given  Patient transferred to: PACU    Handoff Report: Identifed the Patient, Identified the Reponsible Provider, Reviewed the pertinent medical history, Discussed the surgical course, Reviewed Intra-OP anesthesia mangement and issues during anesthesia, Set expectations for post-procedure period and Allowed opportunity for questions and acknowledgement of understanding      Vitals:  Vitals Value Taken Time   /86 03/29/22 1534   Temp 36.3  C (97.4  F) 03/29/22 1531   Pulse 87 03/29/22 1534   Resp 17 03/29/22 1531   SpO2 100 % 03/29/22 1534   Vitals shown include unvalidated device data.    Electronically Signed By: MARVEL Florian CRNA  March 29, 2022  3:38 PM

## 2022-03-29 NOTE — ANESTHESIA PREPROCEDURE EVALUATION
Anesthesia Pre-Procedure Evaluation    Patient: Brennan Vazquez   MRN: [id number] : 1965        Procedure : Procedure(s):  SEPTOPLASTY, NOSE, WITH TURBINOPLASTY Ballon assisted with cryoabation of posterior nasal tissue          Past Medical History:   Diagnosis Date     Anxiety      History of angina     2021, work up no treatment needed     HLD (hyperlipidemia)      HTN (hypertension)      Osteoarthritis of left hip 2017     Osteopenia       Past Surgical History:   Procedure Laterality Date     ORTHOPEDIC SURGERY  2017, 07/15/2021    L Hip Rplacement, R Hip replacement     TOTAL HIP ARTHROPLASTY Left 2017    [facility]       No Known Allergies   Social History     Tobacco Use     Smoking status: Never Smoker     Smokeless tobacco: Former User   Substance Use Topics     Alcohol use: Yes     Comment: 3-7 beers a week      Wt Readings from Last 1 Encounters:   22 83 kg (183 lb)        Anesthesia Evaluation   Pt has had prior anesthetic.         ROS/MED HX  ENT/Pulmonary:  - neg pulmonary ROS     Neurologic:  - neg neurologic ROS     Cardiovascular:  - neg cardiovascular ROS   (+) hypertension-----    METS/Exercise Tolerance: >4 METS Comment: Negative stress test. Negative EKG.    Hematologic:  - neg hematologic  ROS     Musculoskeletal:  - neg musculoskeletal ROS     GI/Hepatic:  - neg GI/hepatic ROS     Renal/Genitourinary:  - neg Renal ROS     Endo:  - neg endo ROS     Psychiatric/Substance Use:  - neg psychiatric ROS     Infectious Disease:  - neg infectious disease ROS     Malignancy:  - neg malignancy ROS     Other:  - neg other ROS          Physical Exam    Airway  airway exam normal      Mallampati: II   TM distance: > 3 FB   Neck ROM: full   Mouth opening: > 3 cm    Respiratory Devices and Support         Dental  no notable dental history         Cardiovascular   cardiovascular exam normal          Pulmonary   pulmonary exam normal                OUTSIDE LABS:  CBC:   Lab  Results   Component Value Date    WBC 4.3 09/03/2021    WBC 5.0 06/29/2021    HGB 13.9 03/16/2022    HGB 13.5 09/03/2021    HCT 41.6 09/03/2021    HCT 42.8 06/29/2021     09/03/2021     06/29/2021     BMP:   Lab Results   Component Value Date     03/16/2022     09/03/2021    POTASSIUM 4.1 03/16/2022    POTASSIUM 4.2 09/03/2021    CHLORIDE 103 03/16/2022    CHLORIDE 101 09/03/2021    CO2 29 03/16/2022    CO2 27 09/03/2021    BUN 11 03/16/2022    BUN 10 09/03/2021    CR 0.85 03/16/2022    CR 0.82 09/03/2021    GLC 97 03/16/2022     09/03/2021     COAGS: No results found for: PTT, INR, FIBR  POC: No results found for: BGM, HCG, HCGS  HEPATIC:   Lab Results   Component Value Date    ALBUMIN 4.3 09/03/2021    PROTTOTAL 6.8 09/03/2021    ALT 16 09/03/2021    AST 14 09/03/2021    ALKPHOS 90 09/03/2021    BILITOTAL 0.8 09/03/2021     OTHER:   Lab Results   Component Value Date    A1C 5.2 10/12/2017    ELEAZAR 9.6 03/16/2022    LIPASE <9 09/03/2021    AMYLASE 53 09/03/2021    CRP 0.2 09/03/2021    SED 4 09/03/2021       Anesthesia Plan    ASA Status:  2      Anesthesia Type: General.   Induction: Intravenous.   Maintenance: Balanced.        Consents    Anesthesia Plan(s) and associated risks, benefits, and realistic alternatives discussed. Questions answered and patient/representative(s) expressed understanding.    - Discussed:     - Discussed with:  Patient         Postoperative Care       PONV prophylaxis: Ondansetron (or other 5HT-3), Dexamethasone or Solumedrol     Comments:                Sukhwinder Jean Baptiste MD

## 2022-03-29 NOTE — DISCHARGE INSTRUCTIONS
You received 975 mg of acetaminophen (Tylenol) at 1:37 PM. Please do not take an additional dose of Tylenol until after 7:37 PM.    Do not exceed 4,000 mg of acetaminophen during a 24 hour period and keep in mind that acetaminophen can also be found in many over-the-counter cold medications as well as narcotics that may be given for pain.      Start nasal saline the day after surgery.    Start mupirocin ointment the day after surgery  Avoid nose blowing 1 week  Sleep with head elevated on several pillows for the first 3 nights to help with swelling  You may use ice or cold packs to face as needed to reduce pain/swelling (20 minutes on, then 20 minutes off)  Follow up appointment in 1 week  Call [phone] if you are not contacted by my office    Discharge Instructions: After Your Surgery    You ve just had surgery. During surgery, you were given medicine called anesthesia to keep you relaxed and free of pain. After surgery, you may have some pain or nausea. This is common. Here are some tips for feeling better and getting well after surgery.    Going home    Your healthcare provider will show you how to take care of yourself when you go home. He or she will also answer your questions. Have an adult family member or friend drive you home. For the first 24 hours after your surgery:    Don't drive or use heavy equipment.    Don't make important decisions or sign legal papers.    Don't drink alcohol.    Have an adult stay with you for 24 hours. He or she can watch for problems and help keep you safe.  Be sure to go to all follow-up visits with your healthcare provider. And rest after your surgery for as long as your healthcare provider tells you to.    Coping with pain    If you have pain after surgery, pain medicine will help you feel better. Take it as told, before pain becomes severe. Also, ask your healthcare provider or pharmacist about other ways to control pain. This might be with heat, ice, or relaxation. And  follow any other instructions your surgeon or nurse gives you.    Tips for taking pain medicine    To get the best relief possible, remember these points:    Pain medicines can upset your stomach. Taking them with a little food may help.    Most pain relievers taken by mouth need at least 20 to 30 minutes to start to work.    Don't wait till your pain becomes severe before you take your medicine. Try to time your medicine so that you can take it before starting an activity. This might be before you get dressed, go for a walk, or sit down for dinner.    Constipation is a common side effect of pain medicines. It's ok to take medicines such as laxatives or stool softeners to help ease constipation. Also ask if you should skip any foods. Drinking lots of fluids and eating foods such as fruits and vegetables that are high in fiber can also help.    Drinking alcohol and taking pain medicine can cause dizziness and slow your breathing. It can even be deadly. Don't drink alcohol while taking pain medicine.    Pain medicine can make you react more slowly to things. Don't drive or run machinery while taking pain medicine.  Your healthcare provider may tell you to take acetaminophen to help ease your pain. Ask him or her how much you are supposed to take each day. Acetaminophen or other pain relievers may interact with your prescription medicines or other over-the-counter (OTC) medicines. Some prescription medicines have acetaminophen and other ingredients. Using both prescription and OTC acetaminophen for pain can cause you to overdose. Read the labels on your OTC medicines with care. This will help you to clearly know the list of ingredients, how much to take, and any warnings. It may also help you not take too much acetaminophen. If you have questions or don't understand the information, ask your pharmacist or healthcare provider to explain it to you before you take the OTC medicine.    Managing nausea    Some people have  an upset stomach after surgery. This is often because of anesthesia, pain, or pain medicine, or the stress of surgery. These tips will help you handle nausea and eat healthy foods as you get better. If you were on a special food plan before surgery, ask your healthcare provider if you should follow it while you get better. These tips may help:      Don't push yourself to eat. Your body will tell you when to eat and how much.    Start off with clear liquids and soup. They are easier to digest.    Next try semi-solid foods, such as mashed potatoes, applesauce, and gelatin, as you feel ready.    Slowly move to solid foods. Don t eat fatty, rich, or spicy foods at first.    Don't force yourself to have 3 large meals a day. Instead eat smaller amounts more often.    Take pain medicines with a small amount of solid food, such as crackers or toast, to prevent nausea.    When to call your healthcare provider    Call your healthcare provider if:    You still have intolerable pain an hour after taking medicine. The medicine may not be strong enough.    You feel too sleepy, dizzy, or groggy. The medicine may be too strong.    You have side effects such as nausea or vomiting, or skin changes such as rash, itching, or hives. Your healthcare provider may suggest other medicines to control side effects.  Rash, itching, or hives may mean you have an allergic reaction. Report this right away. If you have trouble breathing or facial swelling, call 911 right away.    If you have obstructive sleep apnea    You were given anesthesia medicine during surgery to keep you comfortable and free of pain. After surgery, you may have more apnea spells because of this medicine and other medicines you were given. The spells may last longer than usual.   At home:    Keep using the continuous positive airway pressure (CPAP) device when you sleep. Unless your healthcare provider tells you not to, use it when you sleep, day or night. CPAP is a common  device used to treat obstructive sleep apnea.    Talk with your provider before taking any pain medicine, muscle relaxants, or sedatives. Your provider will tell you about the possible dangers of taking these medicines.

## 2022-04-08 DIAGNOSIS — I10 ESSENTIAL HYPERTENSION: ICD-10-CM

## 2022-04-11 ENCOUNTER — OFFICE VISIT (OUTPATIENT)
Dept: FAMILY MEDICINE | Facility: CLINIC | Age: 57
End: 2022-04-11
Payer: COMMERCIAL

## 2022-04-11 VITALS
BODY MASS INDEX: 23.95 KG/M2 | WEIGHT: 176.8 LBS | RESPIRATION RATE: 14 BRPM | DIASTOLIC BLOOD PRESSURE: 83 MMHG | OXYGEN SATURATION: 98 % | HEIGHT: 72 IN | HEART RATE: 66 BPM | SYSTOLIC BLOOD PRESSURE: 113 MMHG | TEMPERATURE: 97.7 F

## 2022-04-11 DIAGNOSIS — J34.2 NASAL SEPTAL DEVIATION: ICD-10-CM

## 2022-04-11 DIAGNOSIS — F41.9 ANXIETY: ICD-10-CM

## 2022-04-11 DIAGNOSIS — Z13.220 LIPID SCREENING: ICD-10-CM

## 2022-04-11 DIAGNOSIS — E78.5 HYPERLIPIDEMIA, UNSPECIFIED HYPERLIPIDEMIA TYPE: ICD-10-CM

## 2022-04-11 DIAGNOSIS — K40.90 LEFT INGUINAL HERNIA: ICD-10-CM

## 2022-04-11 DIAGNOSIS — M81.0 AGE-RELATED OSTEOPOROSIS WITHOUT CURRENT PATHOLOGICAL FRACTURE: ICD-10-CM

## 2022-04-11 DIAGNOSIS — G47.00 INSOMNIA, UNSPECIFIED TYPE: ICD-10-CM

## 2022-04-11 DIAGNOSIS — I10 ESSENTIAL HYPERTENSION: ICD-10-CM

## 2022-04-11 DIAGNOSIS — Z01.818 PREOP GENERAL PHYSICAL EXAM: Primary | ICD-10-CM

## 2022-04-11 DIAGNOSIS — E78.5 HYPERLIPIDEMIA LDL GOAL <100: ICD-10-CM

## 2022-04-11 LAB
CHOLEST SERPL-MCNC: 178 MG/DL
FASTING STATUS PATIENT QL REPORTED: YES
HDLC SERPL-MCNC: 53 MG/DL
LDLC SERPL CALC-MCNC: 111 MG/DL
TRIGL SERPL-MCNC: 70 MG/DL

## 2022-04-11 PROCEDURE — 80061 LIPID PANEL: CPT | Performed by: FAMILY MEDICINE

## 2022-04-11 PROCEDURE — 99214 OFFICE O/P EST MOD 30 MIN: CPT | Performed by: FAMILY MEDICINE

## 2022-04-11 PROCEDURE — 36415 COLL VENOUS BLD VENIPUNCTURE: CPT | Performed by: FAMILY MEDICINE

## 2022-04-11 RX ORDER — ATENOLOL 25 MG/1
TABLET ORAL
Qty: 90 TABLET | Refills: 3 | Status: SHIPPED | OUTPATIENT
Start: 2022-04-11 | End: 2023-01-11

## 2022-04-11 RX ORDER — ESZOPICLONE 2 MG/1
2 TABLET, FILM COATED ORAL AT BEDTIME
Qty: 30 TABLET | Refills: 1 | Status: SHIPPED | OUTPATIENT
Start: 2022-04-11 | End: 2022-06-07

## 2022-04-11 RX ORDER — ALPRAZOLAM 0.25 MG
0.25 TABLET ORAL DAILY PRN
Qty: 30 TABLET | Refills: 1 | Status: SHIPPED | OUTPATIENT
Start: 2022-04-11 | End: 2022-11-18

## 2022-04-11 RX ORDER — VENLAFAXINE HYDROCHLORIDE 37.5 MG/1
37.5 TABLET, EXTENDED RELEASE ORAL DAILY
Qty: 30 TABLET | Refills: 1 | Status: SHIPPED | OUTPATIENT
Start: 2022-04-11 | End: 2023-01-12

## 2022-04-11 ASSESSMENT — PATIENT HEALTH QUESTIONNAIRE - PHQ9
SUM OF ALL RESPONSES TO PHQ QUESTIONS 1-9: 7
10. IF YOU CHECKED OFF ANY PROBLEMS, HOW DIFFICULT HAVE THESE PROBLEMS MADE IT FOR YOU TO DO YOUR WORK, TAKE CARE OF THINGS AT HOME, OR GET ALONG WITH OTHER PEOPLE: SOMEWHAT DIFFICULT
SUM OF ALL RESPONSES TO PHQ QUESTIONS 1-9: 7

## 2022-04-11 ASSESSMENT — ANXIETY QUESTIONNAIRES
GAD7 TOTAL SCORE: 7
6. BECOMING EASILY ANNOYED OR IRRITABLE: NOT AT ALL
4. TROUBLE RELAXING: SEVERAL DAYS
GAD7 TOTAL SCORE: 7
1. FEELING NERVOUS, ANXIOUS, OR ON EDGE: SEVERAL DAYS
3. WORRYING TOO MUCH ABOUT DIFFERENT THINGS: SEVERAL DAYS
GAD7 TOTAL SCORE: 7
2. NOT BEING ABLE TO STOP OR CONTROL WORRYING: MORE THAN HALF THE DAYS
7. FEELING AFRAID AS IF SOMETHING AWFUL MIGHT HAPPEN: SEVERAL DAYS
7. FEELING AFRAID AS IF SOMETHING AWFUL MIGHT HAPPEN: SEVERAL DAYS
5. BEING SO RESTLESS THAT IT IS HARD TO SIT STILL: SEVERAL DAYS

## 2022-04-11 NOTE — PATIENT INSTRUCTIONS
Hold your vitamins and aspirin  5 days prior to procedure.   Ok to start lunesta for sleep 2mg, if no improvement in 2 weeks let me know.   Ok to start venlafaxine 37.5 mg every morning, update me in the next few weeks if tolerating, but it would be about another 4-6 weeks to know if working do a follow up-E visit. Or in person.     Schedule your covid test  here by

## 2022-04-11 NOTE — TELEPHONE ENCOUNTER
"Last Written Prescription Date:  7/1/2021  Last Fill Quantity: 90,  # refills: 3   Last office visit provider:  4/11/2022     Requested Prescriptions   Pending Prescriptions Disp Refills     atenolol (TENORMIN) 25 MG tablet [Pharmacy Med Name: ATENOLOL 25MG TABLETS] 90 tablet 3     Sig: TAKE 1 TABLET(25 MG) BY MOUTH DAILY       Beta-Blockers Protocol Failed - 4/8/2022  1:44 PM        Failed - Blood pressure under 140/90 in past 12 months     BP Readings from Last 3 Encounters:   04/11/22 113/83   03/29/22 (!) 167/90   03/16/22 132/75                 Passed - Patient is age 6 or older        Passed - Recent (12 mo) or future (30 days) visit within the authorizing provider's specialty     Patient has had an office visit with the authorizing provider or a provider within the authorizing providers department within the previous 12 mos or has a future within next 30 days. See \"Patient Info\" tab in inbasket, or \"Choose Columns\" in Meds & Orders section of the refill encounter.              Passed - Medication is active on med list             Becca Jorge RN 04/11/22 10:52 AM  "

## 2022-04-11 NOTE — PROGRESS NOTES
[facility]  [address] [facility] [address]  Phone: [phone]  Fax: [phone]  Primary Provider: Gabriel Lua  Pre-op Performing Provider: GABRIEL LUA       PREOPERATIVE EVALUATION:  Today's date: 4/11/2022    Brennan Vazquez is a 57 year old male who presents for a preoperative evaluation.    Surgical Information:  Surgery/Procedure: Left hernia repair  Surgery Location: [facility]  Surgeon: Dr. Roldan   Surgery Date: 04/22/2022  Time of Surgery: 7 am  Where patient plans to recover: At home with family  Fax number for surgical facility: [phone]    Type of Anesthesia Anticipated: General    Assessment & Plan     The proposed surgical procedure is considered INTERMEDIATE risk.    Problem List Items Addressed This Visit     Anxiety     Patient continues to have heightened anxiety that he thought would resolve once he retired.  We did discuss consideration of seeing mental health therapist.  That did not help him too much just before jail.  Indicates sleep is not adequate and that perhaps if sleep was better his anxiousness would be better as well.  We did discuss all the medications that his tried and failed.  Has not yet used an SNRI so we talked about initiating low-dose venlafaxine 37.5 mg daily.  Would like an update in the next 4 to 6 weeks and how he is doing we likely would increase the dose at that time.  Has had sexual side effects to some medications.  Has tried and failed citalopram, sertraline, paroxetine.   We did discuss consideration of [employer] testing for pharmacogenomic testing .  I did give patient an additional 30 tablets of Xanax for emergency use.  He is not regularly utilizing these medications and is using a low-dose of 0.25 mg            Relevant Medications    ALPRAZolam (XANAX) 0.25 MG tablet    venlafaxine (EFFEXOR-ER) 37.5 MG 24 hr tablet    Hypertension     Stable on atenolol 25 mg daily which  he should continue as this is also been helpful for history of heart palpitations            Insomnia     Continues to have difficulties with staying asleep despite being on zolpidem 10 mg daily so we are going to try an alternative sleep maintenance medication of Lunesta 2 mg nightly.  Patient will let me know if not working well for him we will increase to 3 mg           Relevant Medications    eszopiclone (LUNESTA) 2 MG tablet    Age-related osteoporosis without current pathological fracture     Last bone density scan June 2021.  On alendronate weekly.  Repeat bone scan in 2 years.  Continue vitamin D supplementation           Hyperlipidemia LDL goal <100     On simvastatin 10 mg daily and wanting to repeat lipid panel.  Goal would be for LDL less than 100.  No known coronary artery disease but did have a full work-up in 2021           Nasal septal deviation     Status post surgery.  No acute findings on examination.  Patient plans to follow-up with ENT and we discussed being more patient with the healing process           Left inguinal hernia     Being seen today for consultation for surgery             Other Visit Diagnoses     Preop general physical exam    -  Primary    Lipid screening                   Risks and Recommendations:  The patient has the following additional risks and recommendations for perioperative complications:   - No identified additional risk factors other than previously addressed    Medication Instructions:  Hold all NSAIDs or vitamins 5 days prior to procedure    RECOMMENDATION:  APPROVAL GIVEN to proceed with proposed procedure, without further diagnostic evaluation.    Review of prior external note(s) from - Office notes for preop with Dr. Martinez as well as ENT note          38 minutes spent on the date of the encounter doing chart review, history and exam, documentation and further activities per the note          Subjective     HPI related to upcoming procedure:   Patient is here  for pain along left inguinal hernia- size of egg when pops out.  Saw Dr. Roldan for consultation.   Tolerated last procedure with general anesthesia and intubation for sinus surgery .   No recent health changes or concerns. Afebrile.   Had full cardiac workup recently 1 year ago. No chest pain with exertional activities like pickle ball. Blood presure stable on atenolol and no heart palpitations.    Still having hard time with sleep-sweet dreams   No problem falling asleep with 5-10mg zolpidem, but hard time staying sleep- up after 2-3 hours. Doing magnesium and sleepy time tea.   Thinks about future- long time to medicare. Not in financial crisis.   Thinks sometimes nose wakes up.   Trazodone stopped working and was not helpful. Did 6 counseling visits last year-wasn't really that helpful . Not sure if retired to early.   -xanax really takes the edge off .  Citalopram, sertraline, paroxetine, unwanted side effect.- not sure if gave full effort of meds and interested in trying something. Says when uses Xanax given for emergencies, it works and takes edge off, wondering about refill.  Filled 30 tablets in July 2022      PATIENT HEALTH QUESTIONNAIRE-9 (PHQ - 9)    Over the last 2 weeks, how often have you been bothered by any of the following problems?    1. Little interest or pleasure in doing things -  (P) Several days   2. Feeling down, depressed, or hopeless -  (P) Several days   3. Trouble falling or staying asleep, or sleeping too much - (P) More than half the days   4. Feeling tired or having little energy -  (P) Several days   5. Poor appetite or overeating -  (P) Not at all   6. Feeling bad about yourself - or that you are a failure or have let yourself or your family down -  (P) Several days   7. Trouble concentrating on things, such as reading the newspaper or watching television - (P) Several days   8. Moving or speaking so slowly that other people could have noticed? Or the opposite - being so fidgety  or restless that you have been moving around a lot more than usual (P) Not at all   9. Thoughts that you would be better off dead or of hurting  yourself in some way (P) Not at all   Total Score: (P) 7     If you checked off any problems, how difficult have these problems made it for you to do your work, take care of things at home, or get along with other people?      Developed by Poonam Seth, Ana Luis, Jermaine Moncada and colleagues, with an educational [place] from [employer] No permission required to reproduce, translate, display or distribute. permission required to reproduce, translate, display or distribute.      PHQ 4/30/2021 6/29/2021 4/11/2022   PHQ-9 Total Score 4 6 7   Q9: Thoughts of better off dead/self-harm past 2 weeks Not at all Not at all Not at all     EBONY-7 SCORE 4/30/2021 6/29/2021 4/11/2022   Total Score - - 7 (mild anxiety)   Total Score 10 4 7                Preop Questions 4/8/2022   1. Have you ever had a heart attack or stroke? No   2. Have you ever had surgery on your heart or blood vessels, such as a stent placement, a coronary artery bypass, or surgery on an artery in your head, neck, heart, or legs? No   3. Do you have chest pain with activity? No   4. Do you have a history of  heart failure? No   5. Do you currently have a cold, bronchitis or symptoms of other infection? No   6. Do you have a cough, shortness of breath, or wheezing? No   7. Do you or anyone in your family have previous history of blood clots? No   8. Do you or does anyone in your family have a serious bleeding problem such as prolonged bleeding following surgeries or cuts? No   9. Have you ever had problems with anemia or been told to take iron pills? No   10. Have you had any abnormal blood loss such as black, tarry or bloody stools? No   11. Have you ever had a blood transfusion? No   12. Are you willing to have a blood transfusion if it is medically needed before, during, or after your surgery?  Yes   13. Have you or any of your relatives ever had problems with anesthesia? No   14. Do you have sleep apnea, excessive snoring or daytime drowsiness? No   15. Do you have any artifical heart valves or other implanted medical devices like a pacemaker, defibrillator, or continuous glucose monitor? No   16. Do you have artificial joints? YES -  Hips bilateral    17. Are you allergic to latex? No       Health Care Directive:  Patient does not have a Health Care Directive or Living Will: Discussed advance care planning with patient; information given to patient to review.    Preoperative Review of :   reviewed - controlled substances reflected in medication list.           Review of Systems     Complete ROS reviewed in HPI or otherwise negative      Patient Active Problem List    Diagnosis Date Noted     Left inguinal hernia 03/04/2022     Priority: Medium     Added automatically from request for surgery [id number]       Nasal septal deviation 11/19/2021     Priority: Medium     Added automatically from request for surgery [id number]       Nasal turbinate hypertrophy 11/19/2021     Priority: Medium     Added automatically from request for surgery [id number]       Chronic rhinitis 11/19/2021     Priority: Medium     Added automatically from request for surgery [id number]       Primary osteoarthritis of left hip 06/29/2021     Priority: Medium     Age-related osteoporosis without current pathological fracture      Priority: Medium     Created by Conversion  Replacement Utility updated for latest IMO load         Hyperlipidemia LDL goal <100      Priority: Medium     Created by Conversion         Colon polyp 2017 10/15/2017     Priority: Medium     Every 5 years          Seasonal allergies 06/05/2017     Priority: Medium     Insomnia 06/05/2017     Priority: Medium     Difficulty staying asleep          Multiple lipomas 11/17/2016     Priority: Medium     Osteopenia      Priority: Medium     Created by Conversion  Replacement  Utility updated for latest IMO load         Anxiety      Priority: Medium     Created by Conversion  Replacement Utility updated for latest IMO load         Hypertension      Priority: Medium     Created by Conversion  Replacement Utility updated for latest IMO load         Rosacea      Priority: Medium     Created by Conversion         Restless Legs Syndrome      Priority: Medium     Created by Conversion          Past Medical History:   Diagnosis Date     Anxiety      History of angina     March 2021, work up no treatment needed     HLD (hyperlipidemia)      HTN (hypertension)      Osteoarthritis of left hip 6/5/2017     Osteopenia      Past Surgical History:   Procedure Laterality Date     ORTHOPEDIC SURGERY  08/07/2017, 07/15/2021    L Hip Rplacement, R Hip replacement     SEPTOPLASTY, TURBINOPLASTY, COMBINED N/A 3/29/2022    Procedure: SEPTOPLASTY, NOSE, WITH TURBINOPLASTY [place] assisted with cryoabation of posterior nasal tissue;  Surgeon: Flavio Odonnell MD;  Location: [facility]     TOTAL HIP ARTHROPLASTY Left 08/2017    [facility]      Current Outpatient Medications   Medication Sig Dispense Refill     alendronate (FOSAMAX) 70 MG tablet TAKE 1 TABLET BY MOUTH EVERY 7 DAYS IN THE MORNING AND 30 MINUTES BEFORE FOOD ON AN EMPTY STOMACH AND WITH A FULL GLASS OF WATER 12 tablet 4     ALPRAZolam (XANAX) 0.25 MG tablet Take 1 tablet (0.25 mg) by mouth as needed in the morning for anxiety. 30 tablet 1     amoxicillin (AMOXIL) 500 MG tablet TAKE 4 TABLETS BY MOUTH 1 HOUR BEFORE DENTAL APPOINTMENT       aspirin 81 MG EC tablet Take 81 mg by mouth daily       calcium carbonate-vitamin D3 (CALTRATE 600 PLUS D3) 600 mg(1,500mg) -400 unit per tablet [CALCIUM CARBONATE-VITAMIN D3 (CALTRATE 600 PLUS D3) 600 MG(1,500MG) -400 UNIT PER TABLET] Take 1 tablet by mouth daily.       eszopiclone (LUNESTA) 2 MG tablet Take 1 tablet (2 mg) by mouth At Bedtime 30 tablet 1     Hypertonic Nasal Wash (SINUS RINSE) bottle  "Irrigate each nostril with 120 ml of saline solution 1-2x daily for 10 days 1 each 0     multivitamin therapeutic tablet [MULTIVITAMIN THERAPEUTIC TABLET] Take 1 tablet by mouth daily.       mupirocin (BACTROBAN) 2 % external ointment Apply a pea sized amount to inside of each nostril 3x daily with Qtip for 10 days 22 g 0     simvastatin (ZOCOR) 10 MG tablet Take 1 tablet (10 mg) by mouth At Bedtime 90 tablet 4     venlafaxine (EFFEXOR-ER) 37.5 MG 24 hr tablet Take 1 tablet (37.5 mg) by mouth in the morning. For anxiety (Patient not taking: Reported on 4/13/2022) 30 tablet 1     vitamin B-12 (CYANOCOBALAMIN) 100 MCG tablet Take 1,000 mcg by mouth daily       atenolol (TENORMIN) 25 MG tablet TAKE 1 TABLET(25 MG) BY MOUTH DAILY 90 tablet 3     simvastatin (ZOCOR) 10 MG tablet Take 10 mg by mouth At Bedtime         No Known Allergies     Social History     Tobacco Use     Smoking status: Never Smoker     Smokeless tobacco: Former User     Quit date: 6/1/1987   Substance Use Topics     Alcohol use: Yes     Comment: 3-7 beers a week     Family History   Problem Relation Age of Onset     Hypertension Mother      Hyperlipidemia Mother      Hypertension Maternal Aunt      Hypertension Maternal Uncle      Hypertension Maternal Grandmother      Breast Cancer Maternal Grandmother      Hypertension Maternal Grandfather      History   Drug Use No         Objective     /83   Pulse 66   Temp 97.7  F (36.5  C) (Oral)   Resp 14   Ht 1.835 m (6' 0.25\")   Wt 80.2 kg (176 lb 12.8 oz)   SpO2 98%   BMI 23.81 kg/m      Physical Exam    GENERAL APPEARANCE: healthy, alert and no distress     EYES: EOMI,  PERRL     HENT: ear canals and TM's normal and nose and mouth without ulcers or lesions     NECK: no adenopathy, no asymmetry, masses, or scars and thyroid normal to palpation     RESP: lungs clear to auscultation - no rales, rhonchi or wheezes     CV: regular rates and rhythm, normal S1 S2, no S3 or S4 and no murmur, click or " rub     ABDOMEN:  soft, nontender, no HSM or masses and bowel sounds normal     MS: extremities normal- no gross deformities noted, no evidence of inflammation in joints, FROM in all extremities.     SKIN: no suspicious lesions or rashes     NEURO: Normal strength and tone, sensory exam grossly normal, mentation intact and speech normal     PSYCH: mentation appears normal. and affect normal/bright     LYMPHATICS: No cervical adenopathy    Recent Labs   Lab Test 03/16/22  1107 09/03/21  1321 06/29/21  1014   HGB 13.9 13.5 14.0   PLT  --  244 178    139 140   POTASSIUM 4.1 4.2 3.9   CR 0.85 0.82 0.80        Diagnostics:  Recent Results (from the past 720 hour(s))   Hemoglobin    Collection Time: 03/16/22 11:07 AM   Result Value Ref Range    Hemoglobin 13.9 13.3 - 17.7 g/dL   Basic metabolic panel  (Ca, Cl, CO2, Creat, Gluc, K, Na, BUN)    Collection Time: 03/16/22 11:07 AM   Result Value Ref Range    Sodium 141 136 - 145 mmol/L    Potassium 4.1 3.5 - 5.0 mmol/L    Chloride 103 98 - 107 mmol/L    Carbon Dioxide (CO2) 29 22 - 31 mmol/L    Anion Gap 9 5 - 18 mmol/L    Urea Nitrogen 11 8 - 22 mg/dL    Creatinine 0.85 0.70 - 1.30 mg/dL    Calcium 9.6 8.5 - 10.5 mg/dL    Glucose 97 70 - 125 mg/dL    GFR Estimate >90 >60 mL/min/1.73m2   Asymptomatic COVID-19 Virus (Coronavirus) by PCR Nose    Collection Time: 03/25/22  9:01 AM    Specimen: Nose; Swab   Result Value Ref Range    SARS CoV2 PCR Negative Negative   Lipid panel reflex to direct LDL Fasting    Collection Time: 04/11/22 10:36 AM   Result Value Ref Range    Cholesterol 178 <=199 mg/dL    Triglycerides 70 <=149 mg/dL    Direct Measure HDL 53 >=40 mg/dL    LDL Cholesterol Calculated 111 <=129 mg/dL    Patient Fasting > 8hrs? Yes       EKG not needed    Normal stress test  3/21/22    Revised Cardiac Risk Index (RCRI):  The patient has the following serious cardiovascular risks for perioperative complications:   - No serious cardiac risks = 0 points     RCRI  Interpretation: 0 points: Class I (very low risk - 0.4% complication rate)           Signed Electronically by: Ariela Malcolm DO  Copy of this evaluation report is provided to requesting physician.       Answers for HPI/ROS submitted by the patient on 4/11/2022  If you checked off any problems, how difficult have these problems made it for you to do your work, take care of things at home, or get along with other people?: Somewhat difficult  PHQ9 TOTAL SCORE: 7  EBONY 7 TOTAL SCORE: 7

## 2022-04-11 NOTE — H&P (VIEW-ONLY)
[facility]  [address] [facility] [address]  Phone: [phone]  Fax: [phone]  Primary Provider: Gabriel Lua  Pre-op Performing Provider: GABRIEL LUA       PREOPERATIVE EVALUATION:  Today's date: 4/11/2022    Brennan Vazquez is a 57 year old male who presents for a preoperative evaluation.    Surgical Information:  Surgery/Procedure: Left hernia repair  Surgery Location: [facility]  Surgeon: Dr. Roldan   Surgery Date: 04/22/2022  Time of Surgery: 7 am  Where patient plans to recover: At home with family  Fax number for surgical facility: [phone]    Type of Anesthesia Anticipated: General    Assessment & Plan     The proposed surgical procedure is considered INTERMEDIATE risk.    Problem List Items Addressed This Visit     Anxiety     Patient continues to have heightened anxiety that he thought would resolve once he retired.  We did discuss consideration of seeing mental health therapist.  That did not help him too much just before long term.  Indicates sleep is not adequate and that perhaps if sleep was better his anxiousness would be better as well.  We did discuss all the medications that his tried and failed.  Has not yet used an SNRI so we talked about initiating low-dose venlafaxine 37.5 mg daily.  Would like an update in the next 4 to 6 weeks and how he is doing we likely would increase the dose at that time.  Has had sexual side effects to some medications.  Has tried and failed citalopram, sertraline, paroxetine.   We did discuss consideration of [employer] testing for pharmacogenomic testing .  I did give patient an additional 30 tablets of Xanax for emergency use.  He is not regularly utilizing these medications and is using a low-dose of 0.25 mg            Relevant Medications    ALPRAZolam (XANAX) 0.25 MG tablet    venlafaxine (EFFEXOR-ER) 37.5 MG 24 hr tablet    Hypertension     Stable on atenolol 25 mg daily which  he should continue as this is also been helpful for history of heart palpitations            Insomnia     Continues to have difficulties with staying asleep despite being on zolpidem 10 mg daily so we are going to try an alternative sleep maintenance medication of Lunesta 2 mg nightly.  Patient will let me know if not working well for him we will increase to 3 mg           Relevant Medications    eszopiclone (LUNESTA) 2 MG tablet    Age-related osteoporosis without current pathological fracture     Last bone density scan June 2021.  On alendronate weekly.  Repeat bone scan in 2 years.  Continue vitamin D supplementation           Hyperlipidemia LDL goal <100     On simvastatin 10 mg daily and wanting to repeat lipid panel.  Goal would be for LDL less than 100.  No known coronary artery disease but did have a full work-up in 2021           Nasal septal deviation     Status post surgery.  No acute findings on examination.  Patient plans to follow-up with ENT and we discussed being more patient with the healing process           Left inguinal hernia     Being seen today for consultation for surgery             Other Visit Diagnoses     Preop general physical exam    -  Primary    Lipid screening                   Risks and Recommendations:  The patient has the following additional risks and recommendations for perioperative complications:   - No identified additional risk factors other than previously addressed    Medication Instructions:  Hold all NSAIDs or vitamins 5 days prior to procedure    RECOMMENDATION:  APPROVAL GIVEN to proceed with proposed procedure, without further diagnostic evaluation.    Review of prior external note(s) from - Office notes for preop with Dr. Martinez as well as ENT note          38 minutes spent on the date of the encounter doing chart review, history and exam, documentation and further activities per the note          Subjective     HPI related to upcoming procedure:   Patient is here  for pain along left inguinal hernia- size of egg when pops out.  Saw Dr. Roldan for consultation.   Tolerated last procedure with general anesthesia and intubation for sinus surgery .   No recent health changes or concerns. Afebrile.   Had full cardiac workup recently 1 year ago. No chest pain with exertional activities like pickle ball. Blood presure stable on atenolol and no heart palpitations.    Still having hard time with sleep-sweet dreams   No problem falling asleep with 5-10mg zolpidem, but hard time staying sleep- up after 2-3 hours. Doing magnesium and sleepy time tea.   Thinks about future- long time to medicare. Not in financial crisis.   Thinks sometimes nose wakes up.   Trazodone stopped working and was not helpful. Did 6 counseling visits last year-wasn't really that helpful . Not sure if retired to early.   -xanax really takes the edge off .  Citalopram, sertraline, paroxetine, unwanted side effect.- not sure if gave full effort of meds and interested in trying something. Says when uses Xanax given for emergencies, it works and takes edge off, wondering about refill.  Filled 30 tablets in July 2022      PATIENT HEALTH QUESTIONNAIRE-9 (PHQ - 9)    Over the last 2 weeks, how often have you been bothered by any of the following problems?    1. Little interest or pleasure in doing things -  (P) Several days   2. Feeling down, depressed, or hopeless -  (P) Several days   3. Trouble falling or staying asleep, or sleeping too much - (P) More than half the days   4. Feeling tired or having little energy -  (P) Several days   5. Poor appetite or overeating -  (P) Not at all   6. Feeling bad about yourself - or that you are a failure or have let yourself or your family down -  (P) Several days   7. Trouble concentrating on things, such as reading the newspaper or watching television - (P) Several days   8. Moving or speaking so slowly that other people could have noticed? Or the opposite - being so fidgety  or restless that you have been moving around a lot more than usual (P) Not at all   9. Thoughts that you would be better off dead or of hurting  yourself in some way (P) Not at all   Total Score: (P) 7     If you checked off any problems, how difficult have these problems made it for you to do your work, take care of things at home, or get along with other people?      Developed by Poonam Seth, Ana Luis, Jermaine Moncada and colleagues, with an educational [place] from [employer] No permission required to reproduce, translate, display or distribute. permission required to reproduce, translate, display or distribute.      PHQ 4/30/2021 6/29/2021 4/11/2022   PHQ-9 Total Score 4 6 7   Q9: Thoughts of better off dead/self-harm past 2 weeks Not at all Not at all Not at all     EBONY-7 SCORE 4/30/2021 6/29/2021 4/11/2022   Total Score - - 7 (mild anxiety)   Total Score 10 4 7                Preop Questions 4/8/2022   1. Have you ever had a heart attack or stroke? No   2. Have you ever had surgery on your heart or blood vessels, such as a stent placement, a coronary artery bypass, or surgery on an artery in your head, neck, heart, or legs? No   3. Do you have chest pain with activity? No   4. Do you have a history of  heart failure? No   5. Do you currently have a cold, bronchitis or symptoms of other infection? No   6. Do you have a cough, shortness of breath, or wheezing? No   7. Do you or anyone in your family have previous history of blood clots? No   8. Do you or does anyone in your family have a serious bleeding problem such as prolonged bleeding following surgeries or cuts? No   9. Have you ever had problems with anemia or been told to take iron pills? No   10. Have you had any abnormal blood loss such as black, tarry or bloody stools? No   11. Have you ever had a blood transfusion? No   12. Are you willing to have a blood transfusion if it is medically needed before, during, or after your surgery?  Yes   13. Have you or any of your relatives ever had problems with anesthesia? No   14. Do you have sleep apnea, excessive snoring or daytime drowsiness? No   15. Do you have any artifical heart valves or other implanted medical devices like a pacemaker, defibrillator, or continuous glucose monitor? No   16. Do you have artificial joints? YES -  Hips bilateral    17. Are you allergic to latex? No       Health Care Directive:  Patient does not have a Health Care Directive or Living Will: Discussed advance care planning with patient; information given to patient to review.    Preoperative Review of :   reviewed - controlled substances reflected in medication list.           Review of Systems     Complete ROS reviewed in HPI or otherwise negative      Patient Active Problem List    Diagnosis Date Noted     Left inguinal hernia 03/04/2022     Priority: Medium     Added automatically from request for surgery [id number]       Nasal septal deviation 11/19/2021     Priority: Medium     Added automatically from request for surgery [id number]       Nasal turbinate hypertrophy 11/19/2021     Priority: Medium     Added automatically from request for surgery [id number]       Chronic rhinitis 11/19/2021     Priority: Medium     Added automatically from request for surgery [id number]       Primary osteoarthritis of left hip 06/29/2021     Priority: Medium     Age-related osteoporosis without current pathological fracture      Priority: Medium     Created by Conversion  Replacement Utility updated for latest IMO load         Hyperlipidemia LDL goal <100      Priority: Medium     Created by Conversion         Colon polyp 2017 10/15/2017     Priority: Medium     Every 5 years          Seasonal allergies 06/05/2017     Priority: Medium     Insomnia 06/05/2017     Priority: Medium     Difficulty staying asleep          Multiple lipomas 11/17/2016     Priority: Medium     Osteopenia      Priority: Medium     Created by Conversion  Replacement  Utility updated for latest IMO load         Anxiety      Priority: Medium     Created by Conversion  Replacement Utility updated for latest IMO load         Hypertension      Priority: Medium     Created by Conversion  Replacement Utility updated for latest IMO load         Rosacea      Priority: Medium     Created by Conversion         Restless Legs Syndrome      Priority: Medium     Created by Conversion          Past Medical History:   Diagnosis Date     Anxiety      History of angina     March 2021, work up no treatment needed     HLD (hyperlipidemia)      HTN (hypertension)      Osteoarthritis of left hip 6/5/2017     Osteopenia      Past Surgical History:   Procedure Laterality Date     ORTHOPEDIC SURGERY  08/07/2017, 07/15/2021    L Hip Rplacement, R Hip replacement     SEPTOPLASTY, TURBINOPLASTY, COMBINED N/A 3/29/2022    Procedure: SEPTOPLASTY, NOSE, WITH TURBINOPLASTY [place] assisted with cryoabation of posterior nasal tissue;  Surgeon: Flavio Odonnell MD;  Location: [facility]     TOTAL HIP ARTHROPLASTY Left 08/2017    [facility]      Current Outpatient Medications   Medication Sig Dispense Refill     alendronate (FOSAMAX) 70 MG tablet TAKE 1 TABLET BY MOUTH EVERY 7 DAYS IN THE MORNING AND 30 MINUTES BEFORE FOOD ON AN EMPTY STOMACH AND WITH A FULL GLASS OF WATER 12 tablet 4     ALPRAZolam (XANAX) 0.25 MG tablet Take 1 tablet (0.25 mg) by mouth as needed in the morning for anxiety. 30 tablet 1     amoxicillin (AMOXIL) 500 MG tablet TAKE 4 TABLETS BY MOUTH 1 HOUR BEFORE DENTAL APPOINTMENT       aspirin 81 MG EC tablet Take 81 mg by mouth daily       calcium carbonate-vitamin D3 (CALTRATE 600 PLUS D3) 600 mg(1,500mg) -400 unit per tablet [CALCIUM CARBONATE-VITAMIN D3 (CALTRATE 600 PLUS D3) 600 MG(1,500MG) -400 UNIT PER TABLET] Take 1 tablet by mouth daily.       eszopiclone (LUNESTA) 2 MG tablet Take 1 tablet (2 mg) by mouth At Bedtime 30 tablet 1     Hypertonic Nasal Wash (SINUS RINSE) bottle  "Irrigate each nostril with 120 ml of saline solution 1-2x daily for 10 days 1 each 0     multivitamin therapeutic tablet [MULTIVITAMIN THERAPEUTIC TABLET] Take 1 tablet by mouth daily.       mupirocin (BACTROBAN) 2 % external ointment Apply a pea sized amount to inside of each nostril 3x daily with Qtip for 10 days 22 g 0     simvastatin (ZOCOR) 10 MG tablet Take 1 tablet (10 mg) by mouth At Bedtime 90 tablet 4     venlafaxine (EFFEXOR-ER) 37.5 MG 24 hr tablet Take 1 tablet (37.5 mg) by mouth in the morning. For anxiety (Patient not taking: Reported on 4/13/2022) 30 tablet 1     vitamin B-12 (CYANOCOBALAMIN) 100 MCG tablet Take 1,000 mcg by mouth daily       atenolol (TENORMIN) 25 MG tablet TAKE 1 TABLET(25 MG) BY MOUTH DAILY 90 tablet 3     simvastatin (ZOCOR) 10 MG tablet Take 10 mg by mouth At Bedtime         No Known Allergies     Social History     Tobacco Use     Smoking status: Never Smoker     Smokeless tobacco: Former User     Quit date: 6/1/1987   Substance Use Topics     Alcohol use: Yes     Comment: 3-7 beers a week     Family History   Problem Relation Age of Onset     Hypertension Mother      Hyperlipidemia Mother      Hypertension Maternal Aunt      Hypertension Maternal Uncle      Hypertension Maternal Grandmother      Breast Cancer Maternal Grandmother      Hypertension Maternal Grandfather      History   Drug Use No         Objective     /83   Pulse 66   Temp 97.7  F (36.5  C) (Oral)   Resp 14   Ht 1.835 m (6' 0.25\")   Wt 80.2 kg (176 lb 12.8 oz)   SpO2 98%   BMI 23.81 kg/m      Physical Exam    GENERAL APPEARANCE: healthy, alert and no distress     EYES: EOMI,  PERRL     HENT: ear canals and TM's normal and nose and mouth without ulcers or lesions     NECK: no adenopathy, no asymmetry, masses, or scars and thyroid normal to palpation     RESP: lungs clear to auscultation - no rales, rhonchi or wheezes     CV: regular rates and rhythm, normal S1 S2, no S3 or S4 and no murmur, click or " rub     ABDOMEN:  soft, nontender, no HSM or masses and bowel sounds normal     MS: extremities normal- no gross deformities noted, no evidence of inflammation in joints, FROM in all extremities.     SKIN: no suspicious lesions or rashes     NEURO: Normal strength and tone, sensory exam grossly normal, mentation intact and speech normal     PSYCH: mentation appears normal. and affect normal/bright     LYMPHATICS: No cervical adenopathy    Recent Labs   Lab Test 03/16/22  1107 09/03/21  1321 06/29/21  1014   HGB 13.9 13.5 14.0   PLT  --  244 178    139 140   POTASSIUM 4.1 4.2 3.9   CR 0.85 0.82 0.80        Diagnostics:  Recent Results (from the past 720 hour(s))   Hemoglobin    Collection Time: 03/16/22 11:07 AM   Result Value Ref Range    Hemoglobin 13.9 13.3 - 17.7 g/dL   Basic metabolic panel  (Ca, Cl, CO2, Creat, Gluc, K, Na, BUN)    Collection Time: 03/16/22 11:07 AM   Result Value Ref Range    Sodium 141 136 - 145 mmol/L    Potassium 4.1 3.5 - 5.0 mmol/L    Chloride 103 98 - 107 mmol/L    Carbon Dioxide (CO2) 29 22 - 31 mmol/L    Anion Gap 9 5 - 18 mmol/L    Urea Nitrogen 11 8 - 22 mg/dL    Creatinine 0.85 0.70 - 1.30 mg/dL    Calcium 9.6 8.5 - 10.5 mg/dL    Glucose 97 70 - 125 mg/dL    GFR Estimate >90 >60 mL/min/1.73m2   Asymptomatic COVID-19 Virus (Coronavirus) by PCR Nose    Collection Time: 03/25/22  9:01 AM    Specimen: Nose; Swab   Result Value Ref Range    SARS CoV2 PCR Negative Negative   Lipid panel reflex to direct LDL Fasting    Collection Time: 04/11/22 10:36 AM   Result Value Ref Range    Cholesterol 178 <=199 mg/dL    Triglycerides 70 <=149 mg/dL    Direct Measure HDL 53 >=40 mg/dL    LDL Cholesterol Calculated 111 <=129 mg/dL    Patient Fasting > 8hrs? Yes       EKG not needed    Normal stress test  3/21/22    Revised Cardiac Risk Index (RCRI):  The patient has the following serious cardiovascular risks for perioperative complications:   - No serious cardiac risks = 0 points     RCRI  Interpretation: 0 points: Class I (very low risk - 0.4% complication rate)           Signed Electronically by: Ariela Malcolm DO  Copy of this evaluation report is provided to requesting physician.       Answers for HPI/ROS submitted by the patient on 4/11/2022  If you checked off any problems, how difficult have these problems made it for you to do your work, take care of things at home, or get along with other people?: Somewhat difficult  PHQ9 TOTAL SCORE: 7  EBONY 7 TOTAL SCORE: 7

## 2022-04-12 ASSESSMENT — ANXIETY QUESTIONNAIRES: GAD7 TOTAL SCORE: 7

## 2022-04-12 ASSESSMENT — PATIENT HEALTH QUESTIONNAIRE - PHQ9: SUM OF ALL RESPONSES TO PHQ QUESTIONS 1-9: 7

## 2022-04-13 ENCOUNTER — OFFICE VISIT (OUTPATIENT)
Dept: OTOLARYNGOLOGY | Facility: CLINIC | Age: 57
End: 2022-04-13
Payer: COMMERCIAL

## 2022-04-13 DIAGNOSIS — J31.0 CHRONIC RHINITIS: ICD-10-CM

## 2022-04-13 DIAGNOSIS — J34.3 NASAL TURBINATE HYPERTROPHY: Primary | ICD-10-CM

## 2022-04-13 DIAGNOSIS — R09.81 NASAL CONGESTION: ICD-10-CM

## 2022-04-13 DIAGNOSIS — Z98.890 S/P NASAL SEPTOPLASTY: ICD-10-CM

## 2022-04-13 PROCEDURE — 31231 NASAL ENDOSCOPY DX: CPT | Mod: 58 | Performed by: OTOLARYNGOLOGY

## 2022-04-13 PROCEDURE — 99024 POSTOP FOLLOW-UP VISIT: CPT | Performed by: OTOLARYNGOLOGY

## 2022-04-13 RX ORDER — SIMVASTATIN 10 MG
10 TABLET ORAL AT BEDTIME
COMMUNITY
End: 2022-11-18

## 2022-04-13 NOTE — PROGRESS NOTES
CHIEF COMPLAINT:  Recheck      HISTORY OF PRESENT ILLNESS    Elvis was seen in follow up for post operative visit after septoplasty.  Patient initially said he has had a great improvement in breathing on the right-hand side but now is feeling more congested.  He has been using his nasal saline irrigations regularly.  No other complaints today      Otolaryngology Operative Note     Date of Operation:  03/29/22  Pre-operative Diagnosis:  1. Nasal Septal Deviation  2. Inferior turbinate Hypertrophy 3 Chronic Post Nasal Drip  Post-operative Diagnosis:  same  Procedure(s):  1.  Nasal Septoplasty with Nasal Endoscopic guidance 2. Outfracture of inferior turbinates 3. Cryoablation of posterior nasal tissue            REVIEW OF SYSTEMS    Review of Systems: a 10-system review is reviewed at this encounter.  See scanned document.     Patient has no known allergies.     PHYSICAL EXAM:        HEAD: Normal appearance and symmetry:  No cutaneous lesions.      EARS:   Auricles normal         NASAL ENDOSCOPY    Dorsum:   Straight  Septum: Midline  Nasal Airway patent  Turbinates: 2+       ORAL CAVITY/OROPHARYNX:    Lips:  Normal.     NECK:  Trachea:  midline       NEURO:   Alert and Oriented    GAIT AND STATION:  normal     RESPIRATORY:   Symmetry and Respiratory effort    PSYCH:   normal mood and affect    SKIN:  warm and dry         IMPRESSION:   Encounter Diagnoses   Name Primary?     Nasal turbinate hypertrophy Yes     Chronic rhinitis      Nasal congestion      S/P nasal septoplasty               RECOMMENDATIONS:    Patient is instructed that it will take time for the nose to open up and for the turbinates to extreme down with scarring.  In the meantime he can resume his Astelin nasal spray.  He can stop the mupirocin ointment at this time.  We will see him back in 1 month.  All questions were answered

## 2022-04-13 NOTE — LETTER
4/13/2022         RE: Brennan Vazquez  [address] [place] [address]        Dear Colleague,    Thank you for referring your patient, Brennan Vazquez, to the [facility]. Please see a copy of my visit note below.    CHIEF COMPLAINT:  Recheck      HISTORY OF PRESENT ILLNESS    Elvis was seen in follow up for post operative visit after septoplasty.  Patient initially said he has had a great improvement in breathing on the right-hand side but now is feeling more congested.  He has been using his nasal saline irrigations regularly.  No other complaints today      Otolaryngology Operative Note     Date of Operation:  03/29/22  Pre-operative Diagnosis:  1. Nasal Septal Deviation  2. Inferior turbinate Hypertrophy 3 Chronic Post Nasal Drip  Post-operative Diagnosis:  same  Procedure(s):  1.  Nasal Septoplasty with Nasal Endoscopic guidance 2. Outfracture of inferior turbinates 3. Cryoablation of posterior nasal tissue            REVIEW OF SYSTEMS    Review of Systems: a 10-system review is reviewed at this encounter.  See scanned document.     Patient has no known allergies.     PHYSICAL EXAM:        HEAD: Normal appearance and symmetry:  No cutaneous lesions.      EARS:   Auricles normal         NASAL ENDOSCOPY    Dorsum:   Straight  Septum: Midline  Nasal Airway patent  Turbinates: 2+       ORAL CAVITY/OROPHARYNX:    Lips:  Normal.     NECK:  Trachea:  midline       NEURO:   Alert and Oriented    GAIT AND STATION:  normal     RESPIRATORY:   Symmetry and Respiratory effort    PSYCH:   normal mood and affect    SKIN:  warm and dry         IMPRESSION:   Encounter Diagnoses   Name Primary?     Nasal turbinate hypertrophy Yes     Chronic rhinitis      Nasal congestion      S/P nasal septoplasty               RECOMMENDATIONS:    Patient is instructed that it will take time for the nose to open up and for the turbinates to extreme down with scarring.  In the meantime he can resume his  Astelin nasal spray.  He can stop the mupirocin ointment at this time.  We will see him back in 1 month.  All questions were answered        Again, thank you for allowing me to participate in the care of your patient.        Sincerely,        Flavio Odonnell MD

## 2022-04-14 RX ORDER — SIMVASTATIN 20 MG
20 TABLET ORAL AT BEDTIME
Qty: 90 TABLET | Refills: 4 | Status: SHIPPED | OUTPATIENT
Start: 2022-04-14 | End: 2022-04-22

## 2022-04-15 NOTE — ASSESSMENT & PLAN NOTE
Status post surgery.  No acute findings on examination.  Patient plans to follow-up with ENT and we discussed being more patient with the healing process

## 2022-04-15 NOTE — ASSESSMENT & PLAN NOTE
Continues to have difficulties with staying asleep despite being on zolpidem 10 mg daily so we are going to try an alternative sleep maintenance medication of Lunesta 2 mg nightly.  Patient will let me know if not working well for him we will increase to 3 mg

## 2022-04-15 NOTE — ASSESSMENT & PLAN NOTE
Stable on atenolol 25 mg daily which he should continue as this is also been helpful for history of heart palpitations

## 2022-04-15 NOTE — ASSESSMENT & PLAN NOTE
Was On simvastatin 10 mg daily and wanting to repeat lipid panel.  Goal would be for LDL less than 100>> therefore increasing to simvastatin 20 mg daily.  No known coronary artery disease but did have a full work-up in 2021

## 2022-04-15 NOTE — ASSESSMENT & PLAN NOTE
Last bone density scan June 2021.  On alendronate weekly.  Repeat bone scan in 2 years.  Continue vitamin D supplementation

## 2022-04-15 NOTE — ASSESSMENT & PLAN NOTE
Patient continues to have heightened anxiety that he thought would resolve once he retired.  We did discuss consideration of seeing mental health therapist.  That did not help him too much just before detention.  Indicates sleep is not adequate and that perhaps if sleep was better his anxiousness would be better as well.  We did discuss all the medications that his tried and failed.  Has not yet used an SNRI so we talked about initiating low-dose venlafaxine 37.5 mg daily.  Would like an update in the next 4 to 6 weeks and how he is doing we likely would increase the dose at that time.  Has had sexual side effects to some medications.  Has tried and failed citalopram, sertraline, paroxetine.   We did discuss consideration of CR2 testing for pharmacogenomic testing .  I did give patient an additional 30 tablets of Xanax for emergency use.  He is not regularly utilizing these medications and is using a low-dose of 0.25 mg

## 2022-04-18 ENCOUNTER — LAB (OUTPATIENT)
Dept: FAMILY MEDICINE | Facility: CLINIC | Age: 57
End: 2022-04-18
Attending: SPECIALIST
Payer: COMMERCIAL

## 2022-04-18 DIAGNOSIS — Z11.59 ENCOUNTER FOR SCREENING FOR OTHER VIRAL DISEASES: ICD-10-CM

## 2022-04-18 LAB — SARS-COV-2 RNA RESP QL NAA+PROBE: NEGATIVE

## 2022-04-18 PROCEDURE — U0003 INFECTIOUS AGENT DETECTION BY NUCLEIC ACID (DNA OR RNA); SEVERE ACUTE RESPIRATORY SYNDROME CORONAVIRUS 2 (SARS-COV-2) (CORONAVIRUS DISEASE [COVID-19]), AMPLIFIED PROBE TECHNIQUE, MAKING USE OF HIGH THROUGHPUT TECHNOLOGIES AS DESCRIBED BY CMS-2020-01-R: HCPCS

## 2022-04-18 PROCEDURE — U0005 INFEC AGEN DETEC AMPLI PROBE: HCPCS

## 2022-04-21 ENCOUNTER — ANESTHESIA EVENT (OUTPATIENT)
Dept: SURGERY | Facility: AMBULATORY SURGERY CENTER | Age: 57
End: 2022-04-21
Payer: COMMERCIAL

## 2022-04-22 ENCOUNTER — HOSPITAL ENCOUNTER (OUTPATIENT)
Facility: AMBULATORY SURGERY CENTER | Age: 57
Discharge: HOME | End: 2022-04-22
Attending: SPECIALIST
Payer: COMMERCIAL

## 2022-04-22 ENCOUNTER — ANESTHESIA (OUTPATIENT)
Dept: SURGERY | Facility: AMBULATORY SURGERY CENTER | Age: 57
End: 2022-04-22
Payer: COMMERCIAL

## 2022-04-22 VITALS
OXYGEN SATURATION: 99 % | DIASTOLIC BLOOD PRESSURE: 80 MMHG | HEIGHT: 72 IN | WEIGHT: 176 LBS | RESPIRATION RATE: 16 BRPM | SYSTOLIC BLOOD PRESSURE: 110 MMHG | HEART RATE: 63 BPM | TEMPERATURE: 97.2 F | BODY MASS INDEX: 23.84 KG/M2

## 2022-04-22 DIAGNOSIS — K40.90 LEFT INGUINAL HERNIA: ICD-10-CM

## 2022-04-22 PROCEDURE — 49650 LAP ING HERNIA REPAIR INIT: CPT | Mod: LT | Performed by: SPECIALIST

## 2022-04-22 DEVICE — LAP PROGRIP FLATSHEET 10X15CM LPG1510: Type: IMPLANTABLE DEVICE | Site: GROIN | Status: FUNCTIONAL

## 2022-04-22 RX ORDER — HYDROCODONE BITARTRATE AND ACETAMINOPHEN 5; 325 MG/1; MG/1
1-2 TABLET ORAL EVERY 6 HOURS PRN
Qty: 18 TABLET | Refills: 0 | Status: SHIPPED | OUTPATIENT
Start: 2022-04-22 | End: 2022-04-25

## 2022-04-22 RX ORDER — LIDOCAINE HYDROCHLORIDE 20 MG/ML
INJECTION, SOLUTION INFILTRATION; PERINEURAL PRN
Status: DISCONTINUED | OUTPATIENT
Start: 2022-04-22 | End: 2022-04-22

## 2022-04-22 RX ORDER — PROPOFOL 10 MG/ML
INJECTION, EMULSION INTRAVENOUS PRN
Status: DISCONTINUED | OUTPATIENT
Start: 2022-04-22 | End: 2022-04-22

## 2022-04-22 RX ORDER — ONDANSETRON 2 MG/ML
4 INJECTION INTRAMUSCULAR; INTRAVENOUS EVERY 30 MIN PRN
Status: DISCONTINUED | OUTPATIENT
Start: 2022-04-22 | End: 2022-04-26 | Stop reason: HOSPADM

## 2022-04-22 RX ORDER — SODIUM CHLORIDE, SODIUM LACTATE, POTASSIUM CHLORIDE, CALCIUM CHLORIDE 600; 310; 30; 20 MG/100ML; MG/100ML; MG/100ML; MG/100ML
INJECTION, SOLUTION INTRAVENOUS CONTINUOUS
Status: DISCONTINUED | OUTPATIENT
Start: 2022-04-22 | End: 2022-04-26 | Stop reason: HOSPADM

## 2022-04-22 RX ORDER — ONDANSETRON 4 MG/1
4 TABLET, ORALLY DISINTEGRATING ORAL EVERY 30 MIN PRN
Status: DISCONTINUED | OUTPATIENT
Start: 2022-04-22 | End: 2022-04-26 | Stop reason: HOSPADM

## 2022-04-22 RX ORDER — HYDROCODONE BITARTRATE AND ACETAMINOPHEN 5; 325 MG/1; MG/1
1 TABLET ORAL EVERY 6 HOURS PRN
Status: DISCONTINUED | OUTPATIENT
Start: 2022-04-22 | End: 2022-04-26 | Stop reason: HOSPADM

## 2022-04-22 RX ORDER — CEFAZOLIN SODIUM 2 G/100ML
2 INJECTION, SOLUTION INTRAVENOUS
Status: COMPLETED | OUTPATIENT
Start: 2022-04-22 | End: 2022-04-22

## 2022-04-22 RX ORDER — HYDROMORPHONE HCL IN WATER/PF 6 MG/30 ML
0.2 PATIENT CONTROLLED ANALGESIA SYRINGE INTRAVENOUS EVERY 5 MIN PRN
Status: DISCONTINUED | OUTPATIENT
Start: 2022-04-22 | End: 2022-04-26 | Stop reason: HOSPADM

## 2022-04-22 RX ORDER — NEOSTIGMINE METHYLSULFATE 1 MG/ML
VIAL (ML) INJECTION PRN
Status: DISCONTINUED | OUTPATIENT
Start: 2022-04-22 | End: 2022-04-22

## 2022-04-22 RX ORDER — KETOROLAC TROMETHAMINE 30 MG/ML
INJECTION, SOLUTION INTRAMUSCULAR; INTRAVENOUS PRN
Status: DISCONTINUED | OUTPATIENT
Start: 2022-04-22 | End: 2022-04-22

## 2022-04-22 RX ORDER — BUPIVACAINE HYDROCHLORIDE 2.5 MG/ML
INJECTION, SOLUTION INFILTRATION; PERINEURAL PRN
Status: DISCONTINUED | OUTPATIENT
Start: 2022-04-22 | End: 2022-04-22 | Stop reason: HOSPADM

## 2022-04-22 RX ORDER — OXYCODONE HYDROCHLORIDE 5 MG/1
5 TABLET ORAL EVERY 4 HOURS PRN
Status: DISCONTINUED | OUTPATIENT
Start: 2022-04-22 | End: 2022-04-26 | Stop reason: HOSPADM

## 2022-04-22 RX ORDER — FENTANYL CITRATE 0.05 MG/ML
25 INJECTION, SOLUTION INTRAMUSCULAR; INTRAVENOUS EVERY 5 MIN PRN
Status: DISCONTINUED | OUTPATIENT
Start: 2022-04-22 | End: 2022-04-26 | Stop reason: HOSPADM

## 2022-04-22 RX ORDER — FENTANYL CITRATE 50 UG/ML
INJECTION, SOLUTION INTRAMUSCULAR; INTRAVENOUS PRN
Status: DISCONTINUED | OUTPATIENT
Start: 2022-04-22 | End: 2022-04-22

## 2022-04-22 RX ORDER — MAGNESIUM SULFATE HEPTAHYDRATE 40 MG/ML
4 INJECTION, SOLUTION INTRAVENOUS ONCE
Status: COMPLETED | OUTPATIENT
Start: 2022-04-22 | End: 2022-04-22

## 2022-04-22 RX ORDER — GLYCOPYRROLATE 0.2 MG/ML
INJECTION, SOLUTION INTRAMUSCULAR; INTRAVENOUS PRN
Status: DISCONTINUED | OUTPATIENT
Start: 2022-04-22 | End: 2022-04-22

## 2022-04-22 RX ORDER — ACETAMINOPHEN 325 MG/1
975 TABLET ORAL ONCE
Status: COMPLETED | OUTPATIENT
Start: 2022-04-22 | End: 2022-04-22

## 2022-04-22 RX ORDER — LIDOCAINE 40 MG/G
CREAM TOPICAL
Status: DISCONTINUED | OUTPATIENT
Start: 2022-04-22 | End: 2022-04-26 | Stop reason: HOSPADM

## 2022-04-22 RX ORDER — CEFAZOLIN SODIUM 2 G/100ML
2 INJECTION, SOLUTION INTRAVENOUS SEE ADMIN INSTRUCTIONS
Status: DISCONTINUED | OUTPATIENT
Start: 2022-04-22 | End: 2022-04-26 | Stop reason: HOSPADM

## 2022-04-22 RX ORDER — FENTANYL CITRATE 0.05 MG/ML
25 INJECTION, SOLUTION INTRAMUSCULAR; INTRAVENOUS
Status: CANCELLED | OUTPATIENT
Start: 2022-04-22

## 2022-04-22 RX ORDER — DEXAMETHASONE SODIUM PHOSPHATE 10 MG/ML
INJECTION, SOLUTION INTRAMUSCULAR; INTRAVENOUS PRN
Status: DISCONTINUED | OUTPATIENT
Start: 2022-04-22 | End: 2022-04-22

## 2022-04-22 RX ADMIN — PROPOFOL 50 MG: 10 INJECTION, EMULSION INTRAVENOUS at 12:57

## 2022-04-22 RX ADMIN — KETOROLAC TROMETHAMINE 15 MG: 30 INJECTION, SOLUTION INTRAMUSCULAR; INTRAVENOUS at 13:41

## 2022-04-22 RX ADMIN — Medication 30 MG: at 12:57

## 2022-04-22 RX ADMIN — DEXAMETHASONE SODIUM PHOSPHATE 10 MG: 10 INJECTION, SOLUTION INTRAMUSCULAR; INTRAVENOUS at 12:57

## 2022-04-22 RX ADMIN — CEFAZOLIN SODIUM 2 G: 2 INJECTION, SOLUTION INTRAVENOUS at 12:50

## 2022-04-22 RX ADMIN — ACETAMINOPHEN 975 MG: 325 TABLET ORAL at 12:01

## 2022-04-22 RX ADMIN — HYDROCODONE BITARTRATE AND ACETAMINOPHEN 1 TABLET: 5; 325 TABLET ORAL at 14:38

## 2022-04-22 RX ADMIN — MAGNESIUM SULFATE HEPTAHYDRATE 4 G: 40 INJECTION, SOLUTION INTRAVENOUS at 12:01

## 2022-04-22 RX ADMIN — LIDOCAINE HYDROCHLORIDE 3 ML: 20 INJECTION, SOLUTION INFILTRATION; PERINEURAL at 12:56

## 2022-04-22 RX ADMIN — FENTANYL CITRATE 100 MCG: 50 INJECTION, SOLUTION INTRAMUSCULAR; INTRAVENOUS at 12:56

## 2022-04-22 RX ADMIN — PROPOFOL 200 MG: 10 INJECTION, EMULSION INTRAVENOUS at 12:56

## 2022-04-22 RX ADMIN — GLYCOPYRROLATE 0.6 MG: 0.2 INJECTION, SOLUTION INTRAMUSCULAR; INTRAVENOUS at 13:41

## 2022-04-22 RX ADMIN — SODIUM CHLORIDE, SODIUM LACTATE, POTASSIUM CHLORIDE, CALCIUM CHLORIDE: 600; 310; 30; 20 INJECTION, SOLUTION INTRAVENOUS at 12:50

## 2022-04-22 RX ADMIN — Medication 4 MG: at 13:41

## 2022-04-22 NOTE — DISCHARGE INSTRUCTIONS
You have received 975 mg of Acetaminophen (Tylenol) at 12:00 pm. Please do not take an additional dose of Tylenol until after 6:00 pm     Do not exceed 4,000 mg of acetaminophen during a 24 hour period and keep in mind that acetaminophen can also be found in many over-the-counter cold medications as well as narcotics that may be given for pain.     You received an IV medication today called Toradol. You received this medication at 1:45 PM. This is a NSAID. Therefore, do not take any NSAIDS (Ibuprofen products, Advil, Motrin, etc) until 7:45 PM.    If you have any questions or concerns regarding your procedure, please contact Dr. Roldan, his office number is [phone].

## 2022-04-22 NOTE — ANESTHESIA PREPROCEDURE EVALUATION
Anesthesia Pre-Procedure Evaluation    Patient: Brennan Vazquez   MRN: [id number] : 1965        Procedure : Procedure(s):  HERNIORRHAPHY, INGUINAL, LAPAROSCOPIC          Past Medical History:   Diagnosis Date     Anxiety      History of angina     2021, work up no treatment needed     HLD (hyperlipidemia)      HTN (hypertension)      Osteoarthritis of left hip 2017     Osteopenia       Past Surgical History:   Procedure Laterality Date     ORTHOPEDIC SURGERY  2017, 07/15/2021    L Hip Rplacement, R Hip replacement     SEPTOPLASTY, TURBINOPLASTY, COMBINED N/A 3/29/2022    Procedure: SEPTOPLASTY, NOSE, WITH TURBINOPLASTY [place] assisted with cryoabation of posterior nasal tissue;  Surgeon: Flavio Odonnell MD;  Location: [facility]     TOTAL HIP ARTHROPLASTY Left 2017    [facility]       No Known Allergies   Social History     Tobacco Use     Smoking status: Never Smoker     Smokeless tobacco: Former User     Quit date: 1987   Substance Use Topics     Alcohol use: Yes     Comment: 3-7 beers a week      Wt Readings from Last 1 Encounters:   22 79.8 kg (176 lb)        Anesthesia Evaluation   Pt has had prior anesthetic.     No history of anesthetic complications       ROS/MED HX  ENT/Pulmonary:  - neg pulmonary ROS     Neurologic:  - neg neurologic ROS     Cardiovascular:     (+) hypertension----- (-) murmur and wheezes   METS/Exercise Tolerance: >4 METS    Hematologic:  - neg hematologic  ROS     Musculoskeletal:  - neg musculoskeletal ROS     GI/Hepatic:  - neg GI/hepatic ROS     Renal/Genitourinary:  - neg Renal ROS     Endo:  - neg endo ROS     Psychiatric/Substance Use:     (+) psychiatric history anxiety     Infectious Disease:  - neg infectious disease ROS     Malignancy:  - neg malignancy ROS     Other:  - neg other ROS          Physical Exam    Airway  airway exam normal      Mallampati: II   TM distance: > 3 FB   Neck ROM: full   Mouth opening: > 3 cm    Respiratory  Devices and Support         Dental  no notable dental history       B=Bridge, C=Chipped, L=Loose, M=Missing    Cardiovascular          Rhythm and rate: regular and normal (-) no murmur    Pulmonary           breath sounds clear to auscultation   (-) no wheezes        OUTSIDE LABS:  CBC:   Lab Results   Component Value Date    WBC 4.3 09/03/2021    WBC 5.0 06/29/2021    HGB 13.9 03/16/2022    HGB 13.5 09/03/2021    HCT 41.6 09/03/2021    HCT 42.8 06/29/2021     09/03/2021     06/29/2021     BMP:   Lab Results   Component Value Date     03/16/2022     09/03/2021    POTASSIUM 4.1 03/16/2022    POTASSIUM 4.2 09/03/2021    CHLORIDE 103 03/16/2022    CHLORIDE 101 09/03/2021    CO2 29 03/16/2022    CO2 27 09/03/2021    BUN 11 03/16/2022    BUN 10 09/03/2021    CR 0.85 03/16/2022    CR 0.82 09/03/2021    GLC 97 03/16/2022     09/03/2021     COAGS: No results found for: PTT, INR, FIBR  POC: No results found for: BGM, HCG, HCGS  HEPATIC:   Lab Results   Component Value Date    ALBUMIN 4.3 09/03/2021    PROTTOTAL 6.8 09/03/2021    ALT 16 09/03/2021    AST 14 09/03/2021    ALKPHOS 90 09/03/2021    BILITOTAL 0.8 09/03/2021     OTHER:   Lab Results   Component Value Date    A1C 5.2 10/12/2017    ELEAZAR 9.6 03/16/2022    LIPASE <9 09/03/2021    AMYLASE 53 09/03/2021    CRP 0.2 09/03/2021    SED 4 09/03/2021       Anesthesia Plan    ASA Status:  2   NPO Status:  NPO Appropriate    Anesthesia Type: General.     - Airway: ETT   Induction: Intravenous, Propofol.   Maintenance: Balanced.        Consents    Anesthesia Plan(s) and associated risks, benefits, and realistic alternatives discussed. Questions answered and patient/representative(s) expressed understanding.     - Discussed: Risks, Benefits and Alternatives for BOTH SEDATION and the PROCEDURE were discussed     - Discussed with:  Patient      - Patient is DNR/DNI Status: No         Postoperative Care    Pain management: IV analgesics, Oral pain  medications.   PONV prophylaxis: Ondansetron (or other 5HT-3), Dexamethasone or Solumedrol, Background Propofol Infusion     Comments:    Other Comments: GETA  Tylenol preop  Zofran/Decadron for PONV            Matt Lee MD

## 2022-04-22 NOTE — INTERVAL H&P NOTE
I have reviewed the surgical (or preoperative) H&P that is linked to this encounter, and examined the patient. There are no significant changes.        Bruce Roldan MD  General Surgery [phone]  Vascular Surgery [phone]          Clinical Conditions Present on Arrival:  Clinically Significant Risk Factors Present on Admission                  # Platelet Defect: home medication list includes an antiplatelet medication

## 2022-04-22 NOTE — OP NOTE
Operative Note    Name:  Brennan Vazquez  PCP:  Ariela Malcolm  Procedure Date:  4/22/2022      Procedure(s):  HERNIORRHAPHY, INGUINAL, LAPAROSCOPIC    Pre-Procedure Diagnosis:  Left inguinal hernia [K40.90]     Post-Procedure Diagnosis:    left inguianl hernia    Surgeon(s):  Bruce Roldan MD      Anesthesia Type:  Not documented    Past Medical History:   Diagnosis Date     Anxiety      History of angina     March 2021, work up no treatment needed     HLD (hyperlipidemia)      HTN (hypertension)      Osteoarthritis of left hip 6/5/2017     Osteopenia        Patient Active Problem List    Diagnosis Date Noted     Left inguinal hernia 03/04/2022     Priority: Medium     Added automatically from request for surgery [id number]       Nasal septal deviation 11/19/2021     Priority: Medium     Added automatically from request for surgery [id number]       Nasal turbinate hypertrophy 11/19/2021     Priority: Medium     Added automatically from request for surgery [id number]       Chronic rhinitis 11/19/2021     Priority: Medium     Added automatically from request for surgery [id number]       Primary osteoarthritis of left hip 06/29/2021     Priority: Medium     Age-related osteoporosis without current pathological fracture      Priority: Medium     Created by Conversion  Replacement Utility updated for latest IMO load         Hyperlipidemia LDL goal <100      Priority: Medium     Created by Conversion         Colon polyp 2017 10/15/2017     Priority: Medium     Every 5 years          Seasonal allergies 06/05/2017     Priority: Medium     Insomnia 06/05/2017     Priority: Medium     Difficulty staying asleep          Multiple lipomas 11/17/2016     Priority: Medium     Osteopenia      Priority: Medium     Created by Conversion  Replacement Utility updated for latest IMO load         Anxiety      Priority: Medium     Created by Conversion  Replacement Utility updated for latest IMO load         Hypertension      Priority:  Medium     Created by Conversion  Replacement Utility updated for latest IMO load         Rosacea      Priority: Medium     Created by Conversion         Restless Legs Syndrome      Priority: Medium     Created by Conversion           Findings:  Large indirect hernia    Operative Report:    Consent was obtained and the patient was taken to the operating room.  Gen. anesthesia was administered by the anesthesia staff.  The briefing and timeout was performed by the OR staff.  The patient was prepped and draped in a sterile manner after a Manuel was placed.  The incision was made opposite the hernia at the umbilicus.  This was taken down through the anterior fascia of the rectus sheath.  A dissecting port was placed to the pubic tubercle under direct visualization.  This was dilated to its full diameter under direct visualization and removed.  An operating scope was placed in the preperitoneal space was insufflated to a pressure of 15 mmHg of CO2.  Two 5 mm trochars were placed on the midline under direct visualization of the scope.  The landmarks of the pelvis were identified the pubic tubercle, Flo's ligament and the cord and vascular structures.  The hernia sac was reduced internally.  This was done with blunt and sharp dissection.  3D max mesh was rolled and placed in the preperitoneal space covering the pubic tubercle, Flo's ligament, the cord and vascular structures.  20 mL of Marcaine was injected in the space.  The mesh lied in good position and the CO2 and trochars were removed.  The anterior abdominal wall defect was closed with a 0 Vicryl figure-of-eight suture.  All the incisions were closed with 4-0 Monocryl suture in a subcuticular fashion.  Steri-Strips and sterile dressings and 10 mL of Marcaine were injected in the incisions.  30 mg of Toradol were given IV by anesthesia.  The patient was extubated, the Manuel removed and transferred to the PACU in stable condition.  All sponge and needle counts  are correct.    Estimated Blood Loss:   5 ml    Specimens:    none       Drains:   none    Complications:    None    Bruce Roldan MD     Date: 4/22/2022  Time: 1:53 PM

## 2022-04-22 NOTE — ANESTHESIA POSTPROCEDURE EVALUATION
Patient: Brennan Vazquez    Procedure: Procedure(s):  HERNIORRHAPHY, INGUINAL, LAPAROSCOPIC       Anesthesia Type:  General    Note:  Disposition: Outpatient   Postop Pain Control: Uneventful            Sign Out: Well controlled pain   PONV: No   Neuro/Psych: Uneventful            Sign Out: Acceptable/Baseline neuro status   Airway/Respiratory: Uneventful            Sign Out: Acceptable/Baseline resp. status   CV/Hemodynamics: Uneventful            Sign Out: Acceptable CV status; No obvious hypovolemia; No obvious fluid overload   Other NRE: NONE   DID A NON-ROUTINE EVENT OCCUR? No           Last vitals:  Vitals Value Taken Time   /80 04/22/22 1430   Temp 97.2  F (36.2  C) 04/22/22 1430   Pulse 68 04/22/22 1447   Resp 16 04/22/22 1430   SpO2 99 % 04/22/22 1447   Vitals shown include unvalidated device data.    Electronically Signed By: Matt Lee MD  April 22, 2022  3:08 PM

## 2022-04-22 NOTE — ANESTHESIA CARE TRANSFER NOTE
Patient: Brennan Vazquez    Procedure: Procedure(s):  HERNIORRHAPHY, INGUINAL, LAPAROSCOPIC       Diagnosis: Left inguinal hernia [K40.90]  Diagnosis Additional Information: No value filed.    Anesthesia Type:   General     Note:  Anesthesia Care Transfer Notewriter  Vitals:  Vitals Value Taken Time   /101 04/22/22 1352   Temp 98.2    Pulse 90 04/22/22 1353   Resp 16    SpO2 100 % 04/22/22 1353   Vitals shown include unvalidated device data.    Electronically Signed By: MARVEL Dobbs CRNA  April 22, 2022  1:56 PM

## 2022-05-09 ENCOUNTER — VIRTUAL VISIT (OUTPATIENT)
Dept: SURGERY | Facility: CLINIC | Age: 57
End: 2022-05-09
Payer: COMMERCIAL

## 2022-05-09 DIAGNOSIS — Z48.89 ENCOUNTER FOR POSTOPERATIVE CARE: Primary | ICD-10-CM

## 2022-05-09 PROCEDURE — 99024 POSTOP FOLLOW-UP VISIT: CPT | Performed by: PHYSICIAN ASSISTANT

## 2022-05-09 NOTE — LETTER
"    5/9/2022         RE: Brennan Vazquez  [address] [place] [address]        Dear Colleague,    Thank you for referring your patient, Brennan Vazquez, to the [facility] [place]. Please see a copy of my visit note below.    The patient has been notified of following:     \"This telephone visit will be conducted via a call between you and your physician/provider. We have found that certain health care needs can be provided without the need for a physical exam.  This service lets us provide the care you need with a short phone conversation.  If a prescription is necessary we can send it directly to your pharmacy.  If lab work is needed we can place an order for that and you can then stop by our lab to have the test done at a later time.    Telephone visits are billed at different rates depending on your insurance coverage. During this emergency period, for some insurers they may be billed the same as an in-person visit.  Please reach out to your insurance provider with any questions.    If during the course of the call the physician/provider feels a telephone visit is not appropriate, you will not be charged for this service.\"    Patient has given verbal consent for Telephone visit?  Yes    What phone number would you like to be contacted at? home    How would you like to obtain your AVS? Chulahart  HPI: Pt is here for follow up of a Laparoscopic left inguinal hernia repair with Dr Roldan .   he is doing well.  Pain is well controlled.  No difficulties with the surgical wound/wounds.  he is eating well and denies fever and chills.         There were no vitals taken for this visit.    EXAM:  NA    Assessment/Plan: . Doing well after surgery and should follow up as needed.    Alan CLAROS              Again, thank you for allowing me to participate in the care of your patient.        Sincerely,        Alan Terry PARODRIGO    "

## 2022-05-09 NOTE — PROGRESS NOTES
"The patient has been notified of following:     \"This telephone visit will be conducted via a call between you and your physician/provider. We have found that certain health care needs can be provided without the need for a physical exam.  This service lets us provide the care you need with a short phone conversation.  If a prescription is necessary we can send it directly to your pharmacy.  If lab work is needed we can place an order for that and you can then stop by our lab to have the test done at a later time.    Telephone visits are billed at different rates depending on your insurance coverage. During this emergency period, for some insurers they may be billed the same as an in-person visit.  Please reach out to your insurance provider with any questions.    If during the course of the call the physician/provider feels a telephone visit is not appropriate, you will not be charged for this service.\"    Patient has given verbal consent for Telephone visit?  Yes    What phone number would you like to be contacted at? home    How would you like to obtain your AVS? Angela  HPI: Pt is here for follow up of a Laparoscopic left inguinal hernia repair with Dr Roldan .   he is doing well.  Pain is well controlled.  No difficulties with the surgical wound/wounds.  he is eating well and denies fever and chills.         There were no vitals taken for this visit.    EXAM:  NA    Assessment/Plan: . Doing well after surgery and should follow up as needed.    Alan Terry MPA-C          "

## 2022-05-11 ENCOUNTER — OFFICE VISIT (OUTPATIENT)
Dept: OTOLARYNGOLOGY | Facility: CLINIC | Age: 57
End: 2022-05-11
Payer: COMMERCIAL

## 2022-05-11 DIAGNOSIS — J34.829 NASAL VALVE COLLAPSE: Primary | ICD-10-CM

## 2022-05-11 PROCEDURE — 99024 POSTOP FOLLOW-UP VISIT: CPT | Performed by: OTOLARYNGOLOGY

## 2022-05-11 NOTE — PROGRESS NOTES
CHIEF COMPLAINT:  Recheck      HISTORY OF PRESENT ILLNESS    Elvis was seen in follow up for post op visit s/p nasal septoplasty.    My previous note:    Otolaryngology Operative Note     Date of Operation:  03/29/22  Pre-operative Diagnosis:  1. Nasal Septal Deviation  2. Inferior turbinate Hypertrophy 3 Chronic Post Nasal Drip  Post-operative Diagnosis:  same  Procedure(s):  1.  Nasal Septoplasty with Nasal Endoscopic guidance 2. Outfracture of inferior turbinates 3. Cryoablation of posterior nasal tissue      Name Primary?     Nasal turbinate hypertrophy Yes     Chronic rhinitis       Nasal congestion       S/P nasal septoplasty          RECOMMENDATIONS:     Patient is instructed that it will take time for the nose to open up and for the turbinates to extreme down with scarring.  In the meantime he can resume his Astelin nasal spray.  He can stop the mupirocin ointment at this time.  We will see him back in 1 month.  All questions were answered              REVIEW OF SYSTEMS    Review of Systems: a 10-system review is reviewed at this encounter.  See scanned document.     Patient has no known allergies.     PHYSICAL EXAM:        HEAD: Normal appearance and symmetry:  No cutaneous lesions.      EARS:   Auricles normal      NOSE:    Dorsum:   Straight  Septum: midline  Turbinates: 1-2+  Nasal Valve: partial collapse noted bilaterally       ORAL CAVITY/OROPHARYNX:    Lips:  Normal.     NECK:  Trachea:  midline       NEURO:   Alert and Oriented    GAIT AND STATION:  normal     RESPIRATORY:   Symmetry and Respiratory effort    PSYCH:   normal mood and affect    SKIN:  warm and dry         IMPRESSION:   Encounter Diagnosis   Name Primary?     Nasal valve collapse Yes       RECOMMENDATIONS:    Return visit 3 months  Try breathe right strips  Consider latera implants in future

## 2022-05-11 NOTE — LETTER
5/11/2022         RE: Brennan Vazquez  [address] [place] [address]        Dear Colleague,    Thank you for referring your patient, Brennan Vazquez, to the [facility]. Please see a copy of my visit note below.    CHIEF COMPLAINT:  Recheck      HISTORY OF PRESENT ILLNESS    Elvis was seen in follow up for post op visit s/p nasal septoplasty.    My previous note:    Otolaryngology Operative Note     Date of Operation:  03/29/22  Pre-operative Diagnosis:  1. Nasal Septal Deviation  2. Inferior turbinate Hypertrophy 3 Chronic Post Nasal Drip  Post-operative Diagnosis:  same  Procedure(s):  1.  Nasal Septoplasty with Nasal Endoscopic guidance 2. Outfracture of inferior turbinates 3. Cryoablation of posterior nasal tissue      Name Primary?     Nasal turbinate hypertrophy Yes     Chronic rhinitis       Nasal congestion       S/P nasal septoplasty          RECOMMENDATIONS:     Patient is instructed that it will take time for the nose to open up and for the turbinates to extreme down with scarring.  In the meantime he can resume his Astelin nasal spray.  He can stop the mupirocin ointment at this time.  We will see him back in 1 month.  All questions were answered              REVIEW OF SYSTEMS    Review of Systems: a 10-system review is reviewed at this encounter.  See scanned document.     Patient has no known allergies.     PHYSICAL EXAM:        HEAD: Normal appearance and symmetry:  No cutaneous lesions.      EARS:   Auricles normal      NOSE:    Dorsum:   Straight  Septum: midline  Turbinates: 1-2+  Nasal Valve: partial collapse noted bilaterally       ORAL CAVITY/OROPHARYNX:    Lips:  Normal.     NECK:  Trachea:  midline       NEURO:   Alert and Oriented    GAIT AND STATION:  normal     RESPIRATORY:   Symmetry and Respiratory effort    PSYCH:   normal mood and affect    SKIN:  warm and dry         IMPRESSION:   Encounter Diagnosis   Name Primary?     Nasal valve collapse Yes        RECOMMENDATIONS:    Return visit 3 months  Try breathe right strips  Consider latera implants in future      Again, thank you for allowing me to participate in the care of your patient.        Sincerely,        Flavio Odonnell MD

## 2022-05-11 NOTE — PATIENT INSTRUCTIONS
Return visit 3 months  Try breath right strips at night  Consider Latera implants in future under local

## 2022-05-27 ENCOUNTER — MYC MEDICAL ADVICE (OUTPATIENT)
Dept: OTOLARYNGOLOGY | Facility: CLINIC | Age: 57
End: 2022-05-27
Payer: COMMERCIAL

## 2022-05-27 DIAGNOSIS — R09.81 CHRONIC NASAL CONGESTION: Primary | ICD-10-CM

## 2022-06-01 RX ORDER — AZELASTINE 1 MG/ML
1 SPRAY, METERED NASAL 2 TIMES DAILY
Qty: 30 ML | Refills: 11 | Status: SHIPPED | OUTPATIENT
Start: 2022-06-01 | End: 2023-07-31

## 2022-06-07 DIAGNOSIS — G47.00 INSOMNIA, UNSPECIFIED TYPE: ICD-10-CM

## 2022-06-07 RX ORDER — ESZOPICLONE 2 MG/1
TABLET, FILM COATED ORAL
Qty: 30 TABLET | Refills: 1 | Status: SHIPPED | OUTPATIENT
Start: 2022-06-07 | End: 2022-08-08

## 2022-06-07 NOTE — TELEPHONE ENCOUNTER
Routing refill request to provider for review/approval because:  Drug not on the [facility] [place]ill protocol     Last Written Prescription Date: 04/11/22  Last Fill Quantity: 30, # refills: 1    Requested Prescriptions   Pending Prescriptions Disp Refills    eszopiclone (LUNESTA) 2 MG tablet [Pharmacy Med Name: ESZOPICLONE 2MG TABLETS] 30 tablet      Sig: TAKE 1 TABLET(2 MG) BY MOUTH AT BEDTIME        There is no refill protocol information for this order         Catherine West RN 06/07/22 1:41 PM

## 2022-07-17 ENCOUNTER — HEALTH MAINTENANCE LETTER (OUTPATIENT)
Age: 57
End: 2022-07-17

## 2022-08-05 DIAGNOSIS — M94.9 DISORDER OF BONE AND CARTILAGE: ICD-10-CM

## 2022-08-05 DIAGNOSIS — M89.9 DISORDER OF BONE AND CARTILAGE: ICD-10-CM

## 2022-08-05 DIAGNOSIS — N52.9 ERECTILE DYSFUNCTION, UNSPECIFIED ERECTILE DYSFUNCTION TYPE: Primary | ICD-10-CM

## 2022-08-06 RX ORDER — ALENDRONATE SODIUM 70 MG/1
TABLET ORAL
Qty: 12 TABLET | Refills: 1 | Status: SHIPPED | OUTPATIENT
Start: 2022-08-06 | End: 2022-11-18

## 2022-08-07 RX ORDER — SILDENAFIL CITRATE 20 MG/1
TABLET ORAL
Qty: 30 TABLET | Refills: 1 | Status: SHIPPED | OUTPATIENT
Start: 2022-08-07 | End: 2022-08-09

## 2022-08-07 NOTE — TELEPHONE ENCOUNTER
"Last Written Prescription Date:  8/5/21  Last Fill Quantity: 12,  # refills: 4   Last office visit provider:  4/11/22     Requested Prescriptions   Pending Prescriptions Disp Refills     sildenafil (REVATIO) 20 MG tablet [Pharmacy Med Name: SILDENAFIL 20MG TABLETS] 30 tablet      Sig: TAKE 1 TABLET BY MOUTH 1 HOUR PRIOR TO SEXUAL INTERCOURSE       Erectile Dysfuction Protocol Failed - 8/5/2022  6:20 PM        Failed - Medication is active on med list        Passed - Absence of nitrates on medication list        Passed - Absence of Alpha Blockers on Med list        Passed - Recent (12 mo) or future (30 days) visit within the authorizing provider's specialty     Patient has had an office visit with the authorizing provider or a provider within the authorizing providers department within the previous 12 mos or has a future within next 30 days. See \"Patient Info\" tab in inbasket, or \"Choose Columns\" in Meds & Orders section of the refill encounter.              Passed - Patient is age 18 or older           alendronate (FOSAMAX) 70 MG tablet [Pharmacy Med Name: ALENDRONATE 70MG TABLETS] 12 tablet 4     Sig: TAKE 1 TABLET BY MOUTH EVERY 7 DAYS IN THE MORNING AND 30 MINUTES BEFORE FOOD ON AN EMPTY STOMACH AND WITH A FULL GLASS OF WATER       Bisphosphonates Passed - 8/5/2022  6:20 PM        Passed - Recent (12 mo) or future (30 days) visit within the authorizing provider's specialty     Patient has had an office visit with the authorizing provider or a provider within the authorizing providers department within the previous 12 mos or has a future within next 30 days. See \"Patient Info\" tab in inbasket, or \"Choose Columns\" in Meds & Orders section of the refill encounter.              Passed - Dexa on file within past 2 years     Please review last Dexa result.           Passed - Medication is active on med list        Passed - Patient is age 18 or older        Passed - Normal serum creatinine on file within past 12 months "     Recent Labs   Lab Test 03/16/22  1107   CR 0.85       Ok to refill medication if creatinine is low               Soraya Santos, RN 08/06/22 7:19 PM

## 2022-08-07 NOTE — TELEPHONE ENCOUNTER
"Routing refill request to provider for review/approval because:  Drug not active on patient's medication list    Last Written Prescription Date:    Last Fill Quantity: ,  # refills:    Last office visit provider:  4/11/22     Requested Prescriptions   Pending Prescriptions Disp Refills     sildenafil (REVATIO) 20 MG tablet [Pharmacy Med Name: SILDENAFIL 20MG TABLETS] 30 tablet      Sig: TAKE 1 TABLET BY MOUTH 1 HOUR PRIOR TO SEXUAL INTERCOURSE       Erectile Dysfuction Protocol Failed - 8/5/2022  6:20 PM        Failed - Medication is active on med list        Passed - Absence of nitrates on medication list        Passed - Absence of Alpha Blockers on Med list        Passed - Recent (12 mo) or future (30 days) visit within the authorizing provider's specialty     Patient has had an office visit with the authorizing provider or a provider within the authorizing providers department within the previous 12 mos or has a future within next 30 days. See \"Patient Info\" tab in inbasket, or \"Choose Columns\" in Meds & Orders section of the refill encounter.              Passed - Patient is age 18 or older         Signed Prescriptions Disp Refills    alendronate (FOSAMAX) 70 MG tablet 12 tablet 1     Sig: TAKE 1 TABLET BY MOUTH EVERY 7 DAYS IN THE MORNING AND 30 MINUTES BEFORE FOOD ON AN EMPTY STOMACH AND WITH A FULL GLASS OF WATER       Bisphosphonates Passed - 8/5/2022  6:20 PM        Passed - Recent (12 mo) or future (30 days) visit within the authorizing provider's specialty     Patient has had an office visit with the authorizing provider or a provider within the authorizing providers department within the previous 12 mos or has a future within next 30 days. See \"Patient Info\" tab in inbasket, or \"Choose Columns\" in Meds & Orders section of the refill encounter.              Passed - Dexa on file within past 2 years     Please review last Dexa result.           Passed - Medication is active on med list        Passed - " Patient is age 18 or older        Passed - Normal serum creatinine on file within past 12 months     Recent Labs   Lab Test 03/16/22  1107   CR 0.85       Ok to refill medication if creatinine is low               Soraya Santos RN 08/06/22 7:20 PM

## 2022-08-08 DIAGNOSIS — N52.9 ERECTILE DYSFUNCTION, UNSPECIFIED ERECTILE DYSFUNCTION TYPE: ICD-10-CM

## 2022-08-08 NOTE — TELEPHONE ENCOUNTER
"Routing refill request to provider for review/approval because:  Patient requesting 90 day supply.  Outside protocol window to alter this script.    Last Written Prescription Date:  8/7/22  Last Fill Quantity: 30,  # refills: 1   Last office visit provider:  4/11/22     Requested Prescriptions   Pending Prescriptions Disp Refills     sildenafil (REVATIO) 20 MG tablet [Pharmacy Med Name: SILDENAFIL 20MG TABLETS] 90 tablet      Sig: TAKE 1 TABLET BY MOUTH 1 HOUR PRIOR TO SEXUAL INTERCOURSE       Erectile Dysfuction Protocol Passed - 8/8/2022 12:16 PM        Passed - Absence of nitrates on medication list        Passed - Absence of Alpha Blockers on Med list        Passed - Recent (12 mo) or future (30 days) visit within the authorizing provider's specialty     Patient has had an office visit with the authorizing provider or a provider within the authorizing providers department within the previous 12 mos or has a future within next 30 days. See \"Patient Info\" tab in inbasket, or \"Choose Columns\" in Meds & Orders section of the refill encounter.              Passed - Medication is active on med list        Passed - Patient is age 18 or older             Shalom Calvillo RN 08/08/22 12:16 PM  "

## 2022-08-09 RX ORDER — SILDENAFIL CITRATE 20 MG/1
TABLET ORAL
Qty: 90 TABLET | Refills: 1 | Status: SHIPPED | OUTPATIENT
Start: 2022-08-09 | End: 2022-11-18

## 2022-09-04 ENCOUNTER — HOSPITAL ENCOUNTER (EMERGENCY)
Facility: CLINIC | Age: 57
Discharge: HOME | End: 2022-09-04
Attending: EMERGENCY MEDICINE | Admitting: EMERGENCY MEDICINE

## 2022-09-04 VITALS
SYSTOLIC BLOOD PRESSURE: 131 MMHG | HEIGHT: 72 IN | RESPIRATION RATE: 18 BRPM | OXYGEN SATURATION: 99 % | TEMPERATURE: 97 F | HEART RATE: 66 BPM | WEIGHT: 175 LBS | DIASTOLIC BLOOD PRESSURE: 84 MMHG | BODY MASS INDEX: 23.7 KG/M2

## 2022-09-04 DIAGNOSIS — H57.12 ACUTE LEFT EYE PAIN: ICD-10-CM

## 2022-09-04 DIAGNOSIS — T15.92XA FB EYE, LEFT, INITIAL ENCOUNTER: ICD-10-CM

## 2022-09-04 PROCEDURE — 250N000009 HC RX 250: Performed by: EMERGENCY MEDICINE

## 2022-09-04 PROCEDURE — 99284 EMERGENCY DEPT VISIT MOD MDM: CPT | Performed by: EMERGENCY MEDICINE

## 2022-09-04 RX ORDER — TETRACAINE HYDROCHLORIDE 5 MG/ML
1-2 SOLUTION OPHTHALMIC ONCE
Status: COMPLETED | OUTPATIENT
Start: 2022-09-04 | End: 2022-09-04

## 2022-09-04 RX ADMIN — TETRACAINE HYDROCHLORIDE 1 DROP: 5 SOLUTION OPHTHALMIC at 07:58

## 2022-09-04 ASSESSMENT — VISUAL ACUITY
OS: 20/20
OD: 20/20

## 2022-09-04 ASSESSMENT — ENCOUNTER SYMPTOMS
EYE PAIN: 1
PHOTOPHOBIA: 0
EYE DISCHARGE: 0
EYE REDNESS: 0
FEVER: 0

## 2022-09-04 ASSESSMENT — ACTIVITIES OF DAILY LIVING (ADL): ADLS_ACUITY_SCORE: 35

## 2022-09-04 NOTE — DISCHARGE INSTRUCTIONS
You will likely have pain in your eye for the next few days.    Ibuprofen and tylenol for pain.     If symptoms continue, follow-up with your eye doctor.    If pain is uncontrollable, you have redness, drainage, swelling of your eye or change in vision, please return to emergency department immediately for further evaluation and treatment.

## 2022-09-04 NOTE — ED PROVIDER NOTES
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  [place] as if he had dust or dirt in his eye.  He had discomfort immediately.  Pain is mild worsens with blinking of his eye.  He tried flushing his eye at home but was not successful.  Does not wear corrective lenses or contact lenses.  No change in vision.  No drainage from eyes no redness of eye.  Otherwise feeling well.    The patient's PMHx, Surgical Hx, Allergies, and Medications were all reviewed with the patient.    Allergies:  No Known Allergies    Problem List:    Patient Active Problem List    Diagnosis Date Noted     Left inguinal hernia 03/04/2022     Priority: Medium     Added automatically from request for surgery [id number]       Nasal septal deviation 11/19/2021     Priority: Medium     Added automatically from request for surgery [id number]       Nasal turbinate hypertrophy 11/19/2021     Priority: Medium     Added automatically from request for surgery [id number]       Chronic rhinitis 11/19/2021     Priority: Medium     Added automatically from request for surgery [id number]       Primary osteoarthritis of left hip 06/29/2021     Priority: Medium     Age-related osteoporosis without current pathological fracture      Priority: Medium     Created by Conversion  Replacement Utility updated for latest IMO load         Hyperlipidemia LDL goal <100      Priority: Medium     Created by Conversion         Colon polyp 2017 10/15/2017     Priority: Medium     Every 5 years          Seasonal allergies 06/05/2017     Priority: Medium     Insomnia 06/05/2017     Priority: Medium     Difficulty staying asleep          Multiple lipomas 11/17/2016     Priority: Medium     Osteopenia      Priority: Medium     Created by Conversion  Replacement Utility updated for latest IMO load         Anxiety      Priority: Medium     Created by  Conversion  Replacement Utility updated for latest IMO load         Hypertension      Priority: Medium     Created by Conversion  Replacement Utility updated for latest IMO load         Rosacea      Priority: Medium     Created by Conversion         Restless Legs Syndrome      Priority: Medium     Created by Conversion            Past Medical History:    Past Medical History:   Diagnosis Date     Anxiety      History of angina      HLD (hyperlipidemia)      HTN (hypertension)      Osteoarthritis of left hip 6/5/2017     Osteopenia        Past Surgical History:    Past Surgical History:   Procedure Laterality Date     LAPAROSCOPIC HERNIORRHAPHY INGUINAL Left 4/22/2022    Procedure: HERNIORRHAPHY, INGUINAL, LAPAROSCOPIC;  Surgeon: Bruce Roldan MD;  Location: [facility] OR     ORTHOPEDIC SURGERY  08/07/2017, 07/15/2021    L Hip Rplacement, R Hip replacement     SEPTOPLASTY, TURBINOPLASTY, COMBINED N/A 3/29/2022    Procedure: SEPTOPLASTY, NOSE, WITH TURBINOPLASTY [place] assisted with cryoabation of posterior nasal tissue;  Surgeon: Flavio Odonnell MD;  Location: [facility] OR     TOTAL HIP ARTHROPLASTY Left 08/2017    [facility]        Family History:    Family History   Problem Relation Age of Onset     Hypertension Mother      Hyperlipidemia Mother      Hypertension Maternal Aunt      Hypertension Maternal Uncle      Hypertension Maternal Grandmother      Breast Cancer Maternal Grandmother      Hypertension Maternal Grandfather        Social History:  Marital Status:   [2]  Social History     Tobacco Use     Smoking status: Never Smoker     Smokeless tobacco: Former User     Quit date: 6/1/1987   Substance Use Topics     Alcohol use: Yes     Comment: 3-7 beers a week     Drug use: No        Medications:    alendronate (FOSAMAX) 70 MG tablet  ALPRAZolam (XANAX) 0.25 MG tablet  amoxicillin (AMOXIL) 500 MG tablet  aspirin 81 MG EC tablet  atenolol (TENORMIN) 25 MG tablet  azelastine (ASTELIN) 0.1 %  nasal spray  calcium carbonate-vitamin D3 (CALTRATE 600 PLUS D3) 600 mg(1,500mg) -400 unit per tablet  eszopiclone (LUNESTA) 2 MG tablet  Hypertonic Nasal Wash (SINUS RINSE) bottle  multivitamin therapeutic tablet  mupirocin (BACTROBAN) 2 % external ointment  sildenafil (REVATIO) 20 MG tablet  simvastatin (ZOCOR) 10 MG tablet  venlafaxine (EFFEXOR-ER) 37.5 MG 24 hr tablet  vitamin B-12 (CYANOCOBALAMIN) 100 MCG tablet          Review of Systems   Constitutional: Negative for fever.   Eyes: Positive for pain. Negative for photophobia, discharge and redness.         Physical Exam   BP: 131/84  Pulse: 66  Temp: 97  F (36.1  C)  Resp: 18  Height: 182.9 cm (6')  Weight: 79.4 kg (175 lb)  SpO2: 99 %    Physical Exam  GEN: Awake, alert, and cooperative. No acute distress. Resting comfortably.   HENT: MMM. External ears and nose normal bilaterally.  EYES: EOM intact. Conjunctiva clear. No discharge. No RAPD. No fluorescin uptake. No visualized FB. No FB under lower of upper lids with eversion. Visual acuity 20/20. No cell or flare in anterior chamber. Pain immediately relieved with tetracaine.    NECK: Symmetric, freely mobile.   CV : Extremities warm and well perfused.  PULM: Normal effort. Speaking in full sentences.  NEURO: Normal speech. Following commands. CN II-XII grossly intact. Answering questions and interacting appropriately.   INT: Warm. No diaphoresis. Normal color.          ED Course        Procedures       Critical Care time:  none               No results found for this or any previous visit (from the past 24 hour(s)).    Medications   tetracaine (PONTOCAINE) 0.5 % ophthalmic solution 1-2 drop (1 drop Left Eye Given 9/4/22 0758)       Assessments & Plan (with Medical Decision Making)   57 year old male with no significant ocular history with 2 days of foreign body sensation and pain in left eye.  Pain resolved immediately with tetracaine.  No fluorescein uptake.  Reassuring ocular exam.  500 cc of normal  saline flush.  Lids everted and no retained foreign body identified.  Recommend oral pain control.  To follow-up with eye doctor if symptoms continue beyond 3 days.  If pain worsens, has discharge, redness, swelling, or change in vision, to return to emergency department.  Expresses good understanding of plan and discharged in improved condition.    I have reviewed the nursing notes.         New Prescriptions    No medications on file       Final diagnoses:   FB eye, left, initial encounter   Acute left eye pain     Alphonso Chong MD    9/4/2022   [facility] EMERGENCY DEPT    Disclaimer: This note consists of words and symbols derived from keyboarding and dictation using voice recognition software.  As a result, there may be errors that have gone undetected.  Please consider this when interpreting information found in this note.             Alphonso Chong MD  09/04/22 [id number]

## 2022-09-04 NOTE — ED TRIAGE NOTES
Pt here with dirt and sand in his left eye. Pt's vision is not affected. Rates pain 5/10.      Triage Assessment     Row Name 09/04/22 0746       Triage Assessment (Adult)    Airway WDL WDL       Respiratory WDL    Respiratory WDL WDL       Cardiac WDL    Cardiac WDL WDL       Cognitive/Neuro/Behavioral WDL    Cognitive/Neuro/Behavioral WDL WDL

## 2022-09-04 NOTE — ED NOTES
"Pt presents to ED with c/o sand/dust in eye. Pt attempted flushing/irrigating eye at home with minimal improvement, states feels like something is \"stuck on my iris\".   "

## 2022-09-19 ENCOUNTER — APPOINTMENT (OUTPATIENT)
Dept: OCCUPATIONAL MEDICINE | Facility: CLINIC | Age: 57
End: 2022-09-19

## 2022-09-19 PROCEDURE — 99499 UNLISTED E&M SERVICE: CPT | Performed by: FAMILY MEDICINE

## 2022-09-19 PROCEDURE — 97799 UNLISTED PHYSCL MED/REHAB PX: CPT | Performed by: FAMILY MEDICINE

## 2022-09-25 ENCOUNTER — HEALTH MAINTENANCE LETTER (OUTPATIENT)
Age: 57
End: 2022-09-25

## 2022-10-26 ENCOUNTER — IMMUNIZATION (OUTPATIENT)
Dept: FAMILY MEDICINE | Facility: CLINIC | Age: 57
End: 2022-10-26
Payer: COMMERCIAL

## 2022-10-26 PROCEDURE — 90471 IMMUNIZATION ADMIN: CPT

## 2022-10-26 PROCEDURE — 90662 IIV NO PRSV INCREASED AG IM: CPT

## 2022-11-04 DIAGNOSIS — M94.9 DISORDER OF BONE AND CARTILAGE: ICD-10-CM

## 2022-11-04 DIAGNOSIS — M89.9 DISORDER OF BONE AND CARTILAGE: ICD-10-CM

## 2022-11-05 RX ORDER — ALENDRONATE SODIUM 70 MG/1
TABLET ORAL
Qty: 12 TABLET | Refills: 1 | OUTPATIENT
Start: 2022-11-05

## 2022-11-17 ASSESSMENT — ENCOUNTER SYMPTOMS
HEMATOCHEZIA: 0
CONSTIPATION: 0
EYE PAIN: 0
PARESTHESIAS: 0
SORE THROAT: 0
FEVER: 0
CHILLS: 0
ABDOMINAL PAIN: 0
HEARTBURN: 0
DYSURIA: 0
DIARRHEA: 0
COUGH: 0
MYALGIAS: 0
FREQUENCY: 0
DIZZINESS: 0
SHORTNESS OF BREATH: 0
NAUSEA: 0
HEADACHES: 1
JOINT SWELLING: 0
HEMATURIA: 0
WEAKNESS: 0
NERVOUS/ANXIOUS: 1
ARTHRALGIAS: 0
PALPITATIONS: 0

## 2022-11-18 ENCOUNTER — OFFICE VISIT (OUTPATIENT)
Dept: FAMILY MEDICINE | Facility: CLINIC | Age: 57
End: 2022-11-18
Payer: COMMERCIAL

## 2022-11-18 VITALS
OXYGEN SATURATION: 97 % | BODY MASS INDEX: 24.71 KG/M2 | HEIGHT: 72 IN | WEIGHT: 182.4 LBS | HEART RATE: 69 BPM | RESPIRATION RATE: 16 BRPM | TEMPERATURE: 98.2 F | SYSTOLIC BLOOD PRESSURE: 126 MMHG | DIASTOLIC BLOOD PRESSURE: 80 MMHG

## 2022-11-18 DIAGNOSIS — M94.9 DISORDER OF BONE AND CARTILAGE: ICD-10-CM

## 2022-11-18 DIAGNOSIS — Z86.0100 HISTORY OF COLONIC POLYPS: ICD-10-CM

## 2022-11-18 DIAGNOSIS — E78.5 HYPERLIPIDEMIA LDL GOAL <100: ICD-10-CM

## 2022-11-18 DIAGNOSIS — Z00.00 ROUTINE GENERAL MEDICAL EXAMINATION AT A HEALTH CARE FACILITY: Primary | ICD-10-CM

## 2022-11-18 DIAGNOSIS — F41.9 ANXIETY: ICD-10-CM

## 2022-11-18 DIAGNOSIS — G47.00 INSOMNIA, UNSPECIFIED TYPE: ICD-10-CM

## 2022-11-18 DIAGNOSIS — N52.9 ERECTILE DYSFUNCTION, UNSPECIFIED ERECTILE DYSFUNCTION TYPE: ICD-10-CM

## 2022-11-18 DIAGNOSIS — M89.9 DISORDER OF BONE AND CARTILAGE: ICD-10-CM

## 2022-11-18 PROCEDURE — 99396 PREV VISIT EST AGE 40-64: CPT | Performed by: FAMILY MEDICINE

## 2022-11-18 PROCEDURE — 99214 OFFICE O/P EST MOD 30 MIN: CPT | Mod: 25 | Performed by: FAMILY MEDICINE

## 2022-11-18 RX ORDER — SILDENAFIL CITRATE 20 MG/1
TABLET ORAL
Qty: 90 TABLET | Refills: 1 | Status: SHIPPED | OUTPATIENT
Start: 2022-11-18 | End: 2023-01-12

## 2022-11-18 RX ORDER — CITALOPRAM HYDROBROMIDE 10 MG/1
10 TABLET ORAL DAILY
Qty: 30 TABLET | Refills: 1 | Status: SHIPPED | OUTPATIENT
Start: 2022-11-18 | End: 2023-01-12

## 2022-11-18 RX ORDER — ALENDRONATE SODIUM 70 MG/1
TABLET ORAL
Qty: 12 TABLET | Refills: 1 | Status: SHIPPED | OUTPATIENT
Start: 2022-11-18 | End: 2023-06-21

## 2022-11-18 RX ORDER — ESZOPICLONE 2 MG/1
2 TABLET, FILM COATED ORAL AT BEDTIME
Qty: 30 TABLET | Refills: 3 | Status: SHIPPED | OUTPATIENT
Start: 2022-11-18 | End: 2023-03-29

## 2022-11-18 RX ORDER — ALPRAZOLAM 0.25 MG
0.25 TABLET ORAL DAILY PRN
Qty: 30 TABLET | Refills: 1 | Status: SHIPPED | OUTPATIENT
Start: 2022-11-18 | End: 2023-06-21

## 2022-11-18 RX ORDER — SIMVASTATIN 10 MG
10 TABLET ORAL AT BEDTIME
Qty: 30 TABLET | Refills: 11 | Status: SHIPPED | OUTPATIENT
Start: 2022-11-18 | End: 2023-01-12

## 2022-11-18 ASSESSMENT — ENCOUNTER SYMPTOMS
MYALGIAS: 0
HEARTBURN: 0
NAUSEA: 0
HEADACHES: 1
SORE THROAT: 0
EYE PAIN: 0
HEMATURIA: 0
SHORTNESS OF BREATH: 0
HEMATOCHEZIA: 0
DYSURIA: 0
DIZZINESS: 0
ARTHRALGIAS: 0
NERVOUS/ANXIOUS: 1
WEAKNESS: 0
CONSTIPATION: 0
DIARRHEA: 0
FREQUENCY: 0
FEVER: 0
JOINT SWELLING: 0
PALPITATIONS: 0
COUGH: 0
CHILLS: 0
PARESTHESIAS: 0
ABDOMINAL PAIN: 0

## 2022-11-18 ASSESSMENT — PAIN SCALES - GENERAL: PAINLEVEL: NO PAIN (0)

## 2022-11-18 NOTE — PATIENT INSTRUCTIONS
Start citalopram low-dose of 10 mg.  Plan to follow-up in the office in 1 to 2 months.  If having side effects sooner please let me know.    Sleep medicine referral placed today      Preventive Health Recommendations  Male Ages 50 - 64    Yearly exam:             See your health care provider every year in order to  o   Review health changes.   o   Discuss preventive care.    o   Review your medicines if your doctor has prescribed any.     Have a cholesterol test every 5 years, or more frequently if you are at risk for high cholesterol/heart disease.     Have a diabetes test (fasting glucose) every three years. If you are at risk for diabetes, you should have this test more often.     Have a colonoscopy at age 50, or have a yearly FIT test (stool test). These exams will check for colon cancer.      Talk with your health care provider about whether or not a prostate cancer screening test (PSA) is right for you.    You should be tested each year for STDs (sexually transmitted diseases), if you re at risk.     Shots: Get a flu shot each year. Get a tetanus shot every 10 years.     Nutrition:    Eat at least 5 servings of fruits and vegetables daily.     Eat whole-grain bread, whole-wheat pasta and brown rice instead of white grains and rice.     Get adequate Calcium and Vitamin D.     Lifestyle    Exercise for at least 150 minutes a week (30 minutes a day, 5 days a week). This will help you control your weight and prevent disease.     Limit alcohol to one drink per day.     No smoking.     Wear sunscreen to prevent skin cancer.     See your dentist every six months for an exam and cleaning.     See your eye doctor every 1 to 2 years.

## 2022-11-18 NOTE — PROGRESS NOTES
SUBJECTIVE:   CC: Elvis is an 57 year old who presents for preventative health visit.     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  [place]  Previous PCP is leaving [place] and would like to establish care.    History of anxiety-has tried multiple SSRIs and most recently SNRI.  Notes different side effects with each of them has been on Paxil, citalopram and sertraline.  Most recently venlafaxine was started in April, 2022 he took it for just over a month however caused abdominal pain and discontinued on his own.  He has been on Wellbutrin in the past-unclear about side effects from this.  Feels like he has been on citalopram for the longest and with the most benefit.  Reports symptoms do worsen in the wintertime.  Feels like his lack of sleep also contributes to his lower mood.    Would like refill of xanax, takes prn.  Previous prescription from his old PCP.  Using very infrequently.    * Sleep-biggest concern, can't sleep very well.  Lunesta does help, typically gets about 4-4.5 hours of good sleep and then remainder is off/on sleep.  Did see sleep specialist in 2019 was diagnosed with insomnia, underwent sleep study showing no GERALD.  He has been trialed on Ambien in the past and most recently Lunesta.  Does feel that the Lunesta is similar to Ambien and efficacy for him.  Gets around 4 to 4-1/2 hours of uninterrupted  sleep.  He does not feel rested when he wakes in the morning.  Has not met with sleep medicine since 2019.  Tries to follow good sleep hygiene, when he wakes he will lay in bed and try to fall back asleep not on his phone or turning on TV.    -Colonoscopy in 2017-history of tubular adenoma previously done through [facility] recommended 5-year follow-up.    Today's PHQ-2 Score:   PHQ-2 ( 1999 [employer]) 11/17/2022   Q1: Little interest or pleasure in doing things 1   Q2: Feeling down, depressed or hopeless 1   PHQ-2 Score 2   Q1: Little interest or pleasure in doing things Several days   Q2: Feeling down, depressed or hopeless Several days   PHQ-2 Score 2       Have you ever done Advance Care Planning? (For example, a Health Directive, POLST, or a discussion with a medical provider or your loved ones about your wishes): No, advance care planning information given to patient to review.  Patient declined advance care planning discussion at this time.    Social History     Tobacco Use     Smoking status: Never     Smokeless tobacco: Former     Quit date: 6/1/1987   Substance Use Topics     Alcohol use: Yes     Comment: 3-7 beers a week         Alcohol Use 11/17/2022   Prescreen: >3 drinks/day or >7 drinks/week? No       Last PSA:   Prostate Specific Antigen Screen   Date Value Ref Range Status   10/12/2017 1.8 0.0 - 3.5 ng/mL Final       Reviewed orders with patient. Reviewed health maintenance and updated orders accordingly - Yes      Reviewed and updated as needed this visit by clinical staff   Tobacco  Allergies  Meds              Reviewed and updated as needed this visit by Provider                 Past Medical History:   Diagnosis Date     Anxiety      History of angina     March 2021, work up no treatment needed     HLD (hyperlipidemia)      HTN (hypertension)      Osteoarthritis of left hip 6/5/2017     Osteopenia       Past Surgical History:   Procedure Laterality Date     LAPAROSCOPIC HERNIORRHAPHY INGUINAL  Left 4/22/2022    Procedure: HERNIORRHAPHY, INGUINAL, LAPAROSCOPIC;  Surgeon: Bruce Roldan MD;  Location: [facility] OR     ORTHOPEDIC SURGERY  08/07/2017, 07/15/2021    L Hip Rplacement, R Hip replacement     SEPTOPLASTY, TURBINOPLASTY, COMBINED N/A 3/29/2022    Procedure: SEPTOPLASTY, NOSE, WITH TURBINOPLASTY [place] assisted with cryoabation of posterior nasal tissue;  Surgeon: Flavio Odonnell MD;  Location: [facility] OR     TOTAL HIP ARTHROPLASTY Left 08/2017    [facility]        Review of Systems   Constitutional: Negative for chills and fever.   HENT: Negative for congestion, ear pain, hearing loss and sore throat.    Eyes: Negative for pain and visual disturbance.   Respiratory: Negative for cough and shortness of breath.    Cardiovascular: Negative for chest pain, palpitations and peripheral edema.   Gastrointestinal: Negative for abdominal pain, constipation, diarrhea, heartburn, hematochezia and nausea.   Genitourinary: Positive for impotence. Negative for dysuria, frequency, genital sores, hematuria, penile discharge and urgency.   Musculoskeletal: Negative for arthralgias, joint swelling and myalgias.   Skin: Negative for rash.   Neurological: Positive for headaches. Negative for dizziness, weakness and paresthesias.   Psychiatric/Behavioral: Negative for mood changes. The patient is nervous/anxious.      OBJECTIVE:   /80 (BP Location: Right arm, Patient Position: Chair, Cuff Size: Adult Large)   Pulse 69   Temp 98.2  F (36.8  C) (Tympanic)   Resp 16   Ht 1.829 m (6')   Wt 82.7 kg (182 lb 6.4 oz)   SpO2 97%   BMI 24.74 kg/m      Physical Exam  Constitutional:       Appearance: Normal appearance.   HENT:      Head: Normocephalic.   Cardiovascular:      Rate and Rhythm: Normal rate and regular rhythm.      Heart sounds: Normal heart sounds.   Pulmonary:      Effort: Pulmonary effort is normal.      Breath sounds: Normal breath sounds.   Abdominal:      General: Abdomen is flat.       Palpations: Abdomen is soft.   Skin:     General: Skin is warm and dry.   Neurological:      Mental Status: He is alert.   Psychiatric:         Thought Content: Thought content normal.         Judgment: Judgment normal.       ASSESSMENT/PLAN:   Brennan was seen today for physical and establish care.    Diagnoses and all orders for this visit:    Routine general medical examination at a health care facility  Encouraged to increase exercise.    Disorder of bone and cartilage  Upon chart review most recent DEXA scan was improving compared to previous now at osteopenia level.  It looks like he has been back started on the alendronate since 2020.  Plan for repeat DEXA in 2023.  After this may assess need for drug holiday.  He is taking vitamin D and calcium supplement.  Continuing to be active, no new falls or fractures  -     alendronate (FOSAMAX) 70 MG tablet; TAKE 1 TABLET BY MOUTH EVERY 7 DAYS IN THE MORNING AND 30 MINUTES BEFORE FOOD ON AN EMPTY STOMACH AND WITH A FULL GLASS OF WATER Strength: 70 mg    Insomnia, unspecified type  Has been on Ambien and Lunesta in the past with moderate improvement.  Has not met with sleep medicine since 2019.  I do think he would benefit from touching base with them and reviewing additional insomnia management.  Discussed early morning awakenings could be component of low mood/anxiety  -     eszopiclone (LUNESTA) 2 MG tablet; Take 1 tablet (2 mg) by mouth At Bedtime  -     Adult Sleep Eval & Management Referral; Future    Erectile dysfunction, unspecified erectile dysfunction type  -     sildenafil (REVATIO) 20 MG tablet; TAKE 1 TABLET BY MOUTH 1 HOUR PRIOR TO SEXUAL INTERCOURSE Strength: 20 mg    Anxiety  After lengthy discussion patient would like to try restarting on Celexa.  We will plan to start at low-dose.  I would like patient to let me know if he has any side effects.  Plan to follow-up in 1 month.  Refill of Xanax prescribed.  He is using sparingly and PDMP was  reviewed today.  At next visit we will plan to sign controlled substance agreement  -     ALPRAZolam (XANAX) 0.25 MG tablet; Take 1 tablet (0.25 mg) by mouth daily as needed for anxiety  -     citalopram (CELEXA) 10 MG tablet; Take 1 tablet (10 mg) by mouth daily    Hyperlipidemia LDL goal <100  -     simvastatin (ZOCOR) 10 MG tablet; Take 1 tablet (10 mg) by mouth At Bedtime    History of colonic polyps  -     Colonoscopy Screening  Referral; Future        Patient has been advised of split billing requirements and indicates understanding: Yes      COUNSELING:   Reviewed preventive health counseling, as reflected in patient instructions       Regular exercise       Healthy diet/nutrition       Colorectal cancer screening       Osteoporosis prevention/bone health    -Plan to discuss prostate cancer screening at next visit, has been 5 years since his last PSA was checked    He reports that he has never smoked. He quit smokeless tobacco use about 35 years ago.      KASH BLANK DO  [facility]

## 2023-01-10 DIAGNOSIS — I10 ESSENTIAL HYPERTENSION: ICD-10-CM

## 2023-01-10 ASSESSMENT — ANXIETY QUESTIONNAIRES
GAD7 TOTAL SCORE: 3
IF YOU CHECKED OFF ANY PROBLEMS ON THIS QUESTIONNAIRE, HOW DIFFICULT HAVE THESE PROBLEMS MADE IT FOR YOU TO DO YOUR WORK, TAKE CARE OF THINGS AT HOME, OR GET ALONG WITH OTHER PEOPLE: NOT DIFFICULT AT ALL
8. IF YOU CHECKED OFF ANY PROBLEMS, HOW DIFFICULT HAVE THESE MADE IT FOR YOU TO DO YOUR WORK, TAKE CARE OF THINGS AT HOME, OR GET ALONG WITH OTHER PEOPLE?: NOT DIFFICULT AT ALL
6. BECOMING EASILY ANNOYED OR IRRITABLE: NOT AT ALL
7. FEELING AFRAID AS IF SOMETHING AWFUL MIGHT HAPPEN: SEVERAL DAYS
5. BEING SO RESTLESS THAT IT IS HARD TO SIT STILL: NOT AT ALL
2. NOT BEING ABLE TO STOP OR CONTROL WORRYING: NOT AT ALL
1. FEELING NERVOUS, ANXIOUS, OR ON EDGE: SEVERAL DAYS
3. WORRYING TOO MUCH ABOUT DIFFERENT THINGS: SEVERAL DAYS
7. FEELING AFRAID AS IF SOMETHING AWFUL MIGHT HAPPEN: SEVERAL DAYS
GAD7 TOTAL SCORE: 3
4. TROUBLE RELAXING: NOT AT ALL
GAD7 TOTAL SCORE: 3

## 2023-01-10 ASSESSMENT — PATIENT HEALTH QUESTIONNAIRE - PHQ9
SUM OF ALL RESPONSES TO PHQ QUESTIONS 1-9: 3
10. IF YOU CHECKED OFF ANY PROBLEMS, HOW DIFFICULT HAVE THESE PROBLEMS MADE IT FOR YOU TO DO YOUR WORK, TAKE CARE OF THINGS AT HOME, OR GET ALONG WITH OTHER PEOPLE: NOT DIFFICULT AT ALL
SUM OF ALL RESPONSES TO PHQ QUESTIONS 1-9: 3

## 2023-01-10 NOTE — TELEPHONE ENCOUNTER
"Last Written Prescription Date:  4/11/2022  Last Fill Quantity: 90,  # refills: 3   Last office visit provider:  11/18/2022     Requested Prescriptions   Pending Prescriptions Disp Refills     atenolol (TENORMIN) 25 MG tablet [Pharmacy Med Name: ATENOLOL 25MG TABLETS] 90 tablet 3     Sig: TAKE 1 TABLET(25 MG) BY MOUTH DAILY       Beta-Blockers Protocol Passed - 1/10/2023  8:01 AM        Passed - Blood pressure under 140/90 in past 12 months     BP Readings from Last 3 Encounters:   11/18/22 126/80   09/04/22 131/84   04/22/22 110/80                 Passed - Patient is age 6 or older        Passed - Recent (12 mo) or future (30 days) visit within the authorizing provider's specialty     Patient has had an office visit with the authorizing provider or a provider within the authorizing providers department within the previous 12 mos or has a future within next 30 days. See \"Patient Info\" tab in inbasket, or \"Choose Columns\" in Meds & Orders section of the refill encounter.              Passed - Medication is active on med list             Becca Jorge RN 01/10/23 10:13 AM  "

## 2023-01-11 RX ORDER — ATENOLOL 25 MG/1
TABLET ORAL
Qty: 90 TABLET | Refills: 3 | Status: SHIPPED | OUTPATIENT
Start: 2023-01-11 | End: 2024-04-05

## 2023-01-12 ENCOUNTER — OFFICE VISIT (OUTPATIENT)
Dept: FAMILY MEDICINE | Facility: CLINIC | Age: 58
End: 2023-01-12
Payer: COMMERCIAL

## 2023-01-12 VITALS
TEMPERATURE: 98 F | HEART RATE: 56 BPM | SYSTOLIC BLOOD PRESSURE: 112 MMHG | OXYGEN SATURATION: 99 % | BODY MASS INDEX: 24.49 KG/M2 | HEIGHT: 72 IN | WEIGHT: 180.8 LBS | DIASTOLIC BLOOD PRESSURE: 64 MMHG | RESPIRATION RATE: 14 BRPM

## 2023-01-12 DIAGNOSIS — M81.0 AGE-RELATED OSTEOPOROSIS WITHOUT CURRENT PATHOLOGICAL FRACTURE: ICD-10-CM

## 2023-01-12 DIAGNOSIS — M89.9 DISORDER OF BONE AND CARTILAGE: ICD-10-CM

## 2023-01-12 DIAGNOSIS — I10 ESSENTIAL HYPERTENSION: Primary | ICD-10-CM

## 2023-01-12 DIAGNOSIS — E78.5 HYPERLIPIDEMIA LDL GOAL <100: ICD-10-CM

## 2023-01-12 DIAGNOSIS — M94.9 DISORDER OF BONE AND CARTILAGE: ICD-10-CM

## 2023-01-12 DIAGNOSIS — Z12.5 SCREENING FOR PROSTATE CANCER: ICD-10-CM

## 2023-01-12 DIAGNOSIS — F41.9 ANXIETY: ICD-10-CM

## 2023-01-12 DIAGNOSIS — N52.9 ERECTILE DYSFUNCTION, UNSPECIFIED ERECTILE DYSFUNCTION TYPE: ICD-10-CM

## 2023-01-12 LAB
ANION GAP SERPL CALCULATED.3IONS-SCNC: 12 MMOL/L (ref 7–15)
BUN SERPL-MCNC: 11.9 MG/DL (ref 6–20)
CALCIUM SERPL-MCNC: 9.8 MG/DL (ref 8.6–10)
CHLORIDE SERPL-SCNC: 101 MMOL/L (ref 98–107)
CHOLEST SERPL-MCNC: 187 MG/DL
CREAT SERPL-MCNC: 0.85 MG/DL (ref 0.67–1.17)
DEPRECATED HCO3 PLAS-SCNC: 27 MMOL/L (ref 22–29)
GFR SERPL CREATININE-BSD FRML MDRD: >90 ML/MIN/1.73M2
GLUCOSE SERPL-MCNC: 102 MG/DL (ref 70–99)
HDLC SERPL-MCNC: 69 MG/DL
LDLC SERPL CALC-MCNC: 103 MG/DL
NONHDLC SERPL-MCNC: 118 MG/DL
POTASSIUM SERPL-SCNC: 4.5 MMOL/L (ref 3.4–5.3)
PSA SERPL-MCNC: 1.79 NG/ML (ref 0–3.5)
SODIUM SERPL-SCNC: 140 MMOL/L (ref 136–145)
TRIGL SERPL-MCNC: 74 MG/DL

## 2023-01-12 PROCEDURE — G0103 PSA SCREENING: HCPCS | Performed by: FAMILY MEDICINE

## 2023-01-12 PROCEDURE — 36415 COLL VENOUS BLD VENIPUNCTURE: CPT | Performed by: FAMILY MEDICINE

## 2023-01-12 PROCEDURE — 80048 BASIC METABOLIC PNL TOTAL CA: CPT | Performed by: FAMILY MEDICINE

## 2023-01-12 PROCEDURE — 99214 OFFICE O/P EST MOD 30 MIN: CPT | Performed by: FAMILY MEDICINE

## 2023-01-12 PROCEDURE — 80061 LIPID PANEL: CPT | Performed by: FAMILY MEDICINE

## 2023-01-12 RX ORDER — BUPROPION HYDROCHLORIDE 150 MG/1
150 TABLET, EXTENDED RELEASE ORAL 2 TIMES DAILY
Qty: 60 TABLET | Refills: 1 | Status: SHIPPED | OUTPATIENT
Start: 2023-01-12 | End: 2023-03-29

## 2023-01-12 RX ORDER — SIMVASTATIN 10 MG
10 TABLET ORAL AT BEDTIME
Qty: 90 TABLET | Refills: 3 | Status: SHIPPED | OUTPATIENT
Start: 2023-01-12 | End: 2024-01-17

## 2023-01-12 RX ORDER — CITALOPRAM HYDROBROMIDE 10 MG/1
10 TABLET ORAL DAILY
Qty: 90 TABLET | Refills: 3 | Status: SHIPPED | OUTPATIENT
Start: 2023-01-12 | End: 2023-09-08

## 2023-01-12 RX ORDER — SILDENAFIL CITRATE 20 MG/1
TABLET ORAL
Qty: 30 TABLET | Refills: 1 | Status: SHIPPED | OUTPATIENT
Start: 2023-01-12 | End: 2023-06-21

## 2023-01-12 ASSESSMENT — PAIN SCALES - GENERAL: PAINLEVEL: NO PAIN (0)

## 2023-01-12 ASSESSMENT — PATIENT HEALTH QUESTIONNAIRE - PHQ9
10. IF YOU CHECKED OFF ANY PROBLEMS, HOW DIFFICULT HAVE THESE PROBLEMS MADE IT FOR YOU TO DO YOUR WORK, TAKE CARE OF THINGS AT HOME, OR GET ALONG WITH OTHER PEOPLE: NOT DIFFICULT AT ALL
SUM OF ALL RESPONSES TO PHQ QUESTIONS 1-9: 3

## 2023-01-12 ASSESSMENT — ANXIETY QUESTIONNAIRES: GAD7 TOTAL SCORE: 3

## 2023-01-12 NOTE — PATIENT INSTRUCTIONS
-Continue Celexa 10 mg.  Add on Wellbutrin initially 1 time a day for 3 days then increase to twice daily  -Schedule colonoscopy  -Schedule sleep medicine visit  -Due for repeat DEXA scan, earliest summer 2023

## 2023-01-12 NOTE — PROGRESS NOTES
Assessment & Plan     Hyperlipidemia LDL goal <100  Repeat labs today. Refilled  - Lipid panel reflex to direct LDL Fasting  - simvastatin (ZOCOR) 10 MG tablet  Dispense: 90 tablet; Refill: 3  - Lipid panel reflex to direct LDL Fasting    Erectile dysfunction, unspecified erectile dysfunction type  - sildenafil (REVATIO) 20 MG tablet  Dispense: 30 tablet; Refill: 1    Anxiety  Symptoms significantly improved on Celexa.  We will plan to continue that today.  He does unfortunately have significant sexual side effects.  Is using as needed for ED. plan to augment with Wellbutrin.  Patient will send me a message if symptoms are improved.  Did discuss potentially switching from Celexa to Wellbutrin altogether, patient would prefer to leave Celexa on board as of now  - citalopram (CELEXA) 10 MG tablet  Dispense: 90 tablet; Refill: 3  - buPROPion (WELLBUTRIN SR) 150 MG 12 hr tablet  Dispense: 60 tablet; Refill: 1    Essential hypertension  stable  - Basic metabolic panel  (Ca, Cl, CO2, Creat, Gluc, K, Na, BUN)  - Basic metabolic panel  (Ca, Cl, CO2, Creat, Gluc, K, Na, BUN)    Screening for prostate cancer  - PSA, screen  - PSA, screen    No follow-ups on file.    [facility]   Elvis is a 57 year old, presenting for the following health issues:  Depression and Anxiety      History of Present Illness       Mental Health Follow-up:  Patient presents to follow-up on Depression & Anxiety.Patient's depression since last visit has been:  Better  The patient is having other symptoms associated with depression.  Patient's anxiety since last visit has been:  Better  The patient is having other symptoms associated with anxiety.  Any significant life events: No  Patient is not feeling anxious or having panic attacks.  Patient has no concerns about alcohol or drug use.    He eats 2-3 servings of fruits and vegetables daily.He consumes 1 sweetened beverage(s) daily.He  exercises with enough effort to increase his heart rate 20 to 29 minutes per day.  He exercises with enough effort to increase his heart rate 5 days per week.   He is taking medications regularly.    Today's PHQ-9         PHQ-9 Total Score: 3    PHQ-9 Q9 Thoughts of better off dead/self-harm past 2 weeks :   Not at all    How difficult have these problems made it for you to do your work, take care of things at home, or get along with other people: Not difficult at all  Today's EBONY-7 Score: 3     PHQ 6/29/2021 4/11/2022 1/10/2023   PHQ-9 Total Score 6 7 3   Q9: Thoughts of better off dead/self-harm past 2 weeks Not at all Not at all Not at all     EBONY-7 SCORE 6/29/2021 4/11/2022 1/10/2023   Total Score - 7 (mild anxiety) 3 (minimal anxiety)   Total Score 4 7 3       * would like his cholesterol levels checked.  Did have a little bit of cereal around 5:45 AM, nothing since.    -Celexa has been working well for him.  Does feel like his symptoms of anxiety have significantly improved.  He does have unfortunately significant decrease in libido.  Wondering what can be done about this.    -Due to schedule colonoscopy and sleep medicine visit    -Need for repeat DEXA earliest summer 2023    Review of Systems   Constitutional, HEENT, cardiovascular, pulmonary, gi and gu systems are negative, except as otherwise noted.      Objective    /64 (BP Location: Right arm, Patient Position: Chair, Cuff Size: Adult Large)   Pulse 56   Temp 98  F (36.7  C) (Tympanic)   Resp 14   Ht 1.829 m (6')   Wt 82 kg (180 lb 12.8 oz)   SpO2 99%   BMI 24.52 kg/m    Body mass index is 24.52 kg/m .  Physical Exam  Constitutional:       Appearance: Normal appearance.   HENT:      Head: Normocephalic.      Mouth/Throat:      Mouth: Mucous membranes are moist.   Cardiovascular:      Rate and Rhythm: Normal rate and regular rhythm.   Pulmonary:      Effort: Pulmonary effort is normal.      Breath sounds: Normal breath sounds.   Abdominal:       General: Abdomen is flat.      Palpations: Abdomen is soft.   Musculoskeletal:      Right lower leg: No edema.      Left lower leg: No edema.   Skin:     General: Skin is warm and dry.   Neurological:      Mental Status: He is alert.   Psychiatric:         Thought Content: Thought content normal.         Judgment: Judgment normal.

## 2023-01-23 ENCOUNTER — TELEPHONE (OUTPATIENT)
Dept: FAMILY MEDICINE | Facility: CLINIC | Age: 58
End: 2023-01-23
Payer: COMMERCIAL

## 2023-01-23 NOTE — TELEPHONE ENCOUNTER
COVID Positive/Requesting COVID treatment    Patient is positive for COVID and requesting treatment options.    Date of positive COVID test (PCR or at home)? 1/23 @ home test  Current COVID symptoms: fever or chills, cough, sore throat and congestion or runny nose  Date COVID symptoms began: 1/22/23    Message should be routed to clinic RN pool. Best phone number to use for call back: [phone]

## 2023-01-24 ENCOUNTER — VIRTUAL VISIT (OUTPATIENT)
Dept: URGENT CARE | Facility: CLINIC | Age: 58
End: 2023-01-24
Payer: COMMERCIAL

## 2023-01-24 DIAGNOSIS — U07.1 INFECTION DUE TO 2019 NOVEL CORONAVIRUS: Primary | ICD-10-CM

## 2023-01-24 PROCEDURE — 99213 OFFICE O/P EST LOW 20 MIN: CPT | Mod: CS

## 2023-01-24 NOTE — PROGRESS NOTES
Elvis is a 57 year old who is being evaluated via a billable video visit.        Assessment & Plan   Diagnoses and all orders for this visit:    Infection due to 2019 novel coronavirus  -     nirmatrelvir and ritonavir (PAXLOVID) therapy pack; Take 3 tablets by mouth 2 times daily for 5 days (Take 2 Nirmatrelvir tablets and 1 Ritonavir tablet twice daily for 5 days)            15 minutes spent on the date of the encounter doing chart review, patient visit and documentation        COVID-19 positive patient.  Encounter for consideration of medication intervention. Patient does qualify for a prescription. Full discussion with patient including medication options, risks and benefits. Potential drug interactions reviewed with patient.     Treatment Planned Paxlovid sent to [facility] in [place], [place]    Temporary change to home medications: Holding Sildenafil, xanax and Zocor.    None     Estimated body mass index is 24.52 kg/m  as calculated from the following:    Height as of 1/12/23: 1.829 m (6').    Weight as of 1/12/23: 82 kg (180 lb 12.8 oz).  GFR Estimate   Date Value Ref Range Status   01/12/2023 >90 >60 mL/min/1.73m2 Final     Comment:     Effective December 21, 2021 eGFRcr in adults is calculated using the 2021 CKD-EPI creatinine equation which includes age and gender (Balck et al., [place], DOI: 10.1056/GZBWdc5848895)   06/29/2021 >60 >60 mL/min/1.73m2 Final     Lab Results   Component Value Date    DEAZT87RMM Negative 04/18/2022       Return in about 1 week (around 1/31/2023), or if symptoms worsen or fail to improve.    Virtual Urgent Care  [facility] VIRTUAL URGENT CARE    Subjective   Elvis is a 57 year old, presenting for the following health issues:  No chief complaint on file.      HPI       COVID-19 Symptom Review  How many days ago did these symptoms start? 1 day ago    Are any of the following symptoms significant for you?    New or worsening difficulty breathing? No    Worsening cough? Yes, it's a  dry cough.     Fever or chills? Yes, I felt feverish or had chills.    Headache: YES    Sore throat: YES    Chest pain: No    Diarrhea: No    Body aches? YES    What treatments has patient tried? Nonsteroidals   Does patient live in a nursing home, group home, or shelter? No  Does patient have a way to get food/medications during quarantined? Yes, I have a friend or family member who can help me.      Review of Systems   Constitutional, HEENT, cardiovascular, pulmonary, gi and gu systems are negative, except as otherwise noted.      Objective           Vitals:  No vitals were obtained today due to virtual visit.    Physical Exam   GENERAL: Healthy, alert and no distress  RESP: No audible wheeze, cough, or visible cyanosis.  No visible retractions or increased work of breathing.              Video-Visit Details    Type of service:  Video Visit     Originating Location (pt. Location): Home    Distant Location (provider location):  Off-site  Platform used for Video Visit: [employer]

## 2023-01-24 NOTE — TELEPHONE ENCOUNTER
Patient advised to start an evisit as he states he is having a little shortness of breath but could just be from his nose congestion. Want a provider to have eyes on this.    Lena Montenegro RN

## 2023-01-24 NOTE — PATIENT INSTRUCTIONS
COVID-19 Outpatient Treatments  Your care team can help you find the best treatments for COVID-19. Talk to a health care provider or refer to the FDA medicine fact sheets below.    Important: You can't have Paxlovid or molnupiravir if you're starting the medicine more than 5 days after your symptoms have started.  Paxlovid: https://www.fda.gov/media/182973/download  Molnupiravir (Lagevrio): https://www.fda.gov/media/543871/download  Paxlovid (nimatrelvir and ritonavir)  How it works  Two medicines (nirmatrelvir and ritonavir) are taken together. They stop the virus from growing. Less amount of virus is easier for your body to fight.  Benefits  Lowers risk of a hospital stay or death from COVID-19.  How to take    Medicine comes in a daily container with both medicine tablets. Take by mouth twice daily (once in the morning, once at night) for 5 days.    The number of tablets to take varies by patient.    Don't chew or break capsules. Swallow whole.  When to take  Take as soon as possible after positive COVID-19 test result, and within 5 days of your first symptoms.  Who can take it  Patients must be 12 years or older, weigh at least 88 pounds, and have tested positive for COVID-19. Paxlovid is the preferred treatment for pregnant patients.  Possible side effects  Can cause altered sense of taste, diarrhea (loose, watery stools), high blood pressure, muscle aches.  Medicine conflicts    Some medicines may conflict with Paxlovid and may cause serious side effects.    Tell your care team about all the medicines you take, including prescription and over-the-counter medicines, vitamins, and herbal supplements.    Your care team will review your medicines to make sure that you can safely take Paxlovid.  Cautions    Paxlovid is not advised for patients with severe kidney or liver disease. If you have kidney or liver problems, the dose may need to be changed.    If you're pregnant or breastfeeding, talk to your care team  about your options.    If you take hormonal birth control (such as the Pill), then you or your partner should also use a non-hormonal form of birth control (such as a condom). Keep doing this for 1 menstrual cycle after your last dose of Paxlovid.  Molnupiravir (Lagevrio)  How it works  Stops the virus from growing. Less amount of virus is easier for your body to fight.  Benefits  Lowers risk of a hospital stay or death from COVID-19.  How to take  Take 4 capsules by mouth every 12 hours (4 in the morning and 4 at night) for 5 days. Don't chew or break capsules. Swallow whole.  When to take  Take as soon as possible after positive COVID-19 test result, and within 5 days of your first symptoms.  Who can take it  Patients must be 18 years or older and have tested positive for COVID-19.  Possible side effects  Diarrhea (loose, watery stools), nausea (feeling sick to your stomach), dizziness, headaches.  Medicine conflicts  Right now, there are no known conflicts with other drugs. But tell your care team about all medicines you take.  Cautions    This medicine is not advised for patients who are pregnant.    If you are someone who could become pregnant, use trusted birth control until 4 days after your last dose of molnupiravir.    If your partner could become pregnant, you should use trusted birth control until 3 months after your last dose of molnupiravir.  For informational purposes only. Not to replace the advice of your health care provider. Copyright   2022 [facility]. All rights reserved. Clinically reviewed by Catherine Serrano, PharmD, BCACP. [employer] [id number] - REV 12/22.    Coping with Life After COVID-19  Being in the hospital because of COVID-19 is scary. Going home can be scary, too. You may face changes to your life, the way you work or what you can eat. It s hard to adjust to change, and it s normal to feel afraid, frustrated or even angry. These feelings usually go away over time. If your  feelings don t start to get better, it s called  adjustment disorder.      What signs should I look out for?  Adjustment disorder can happen to anyone in a time of stress. It makes it hard to cope with daily life. You may feel lonely or fight with loved ones, even if you re glad to be home. Watch for these signs:    Fear or worry    Hard time focusing    Sadness or anger    Trouble talking to family or friends    Feeling like you don t fit in or isolating yourself    Problems with sleep     Drinking alcohol or taking drugs to cope    What can I do?  You can help yourself get better. Feeling you have control helps you move forward. You may wonder if you ll be able to do things you did before. Be patient. Do your best to make the most of every day. Try to build relationships, be as active as you can, eat right and keep a sense of humor. Avoid smoking and drinking too much alcohol. Call your family doctor or clinic if you re not sure what to do. They can guide you to care or other services.    When should I get help?  Think about getting counseling if your sadness or frustration gets worse. Together with a trained counselor, you can talk about your problems adjusting to life after your hospital stay. You can come up with new ways to handle changes that give you more control. Your family doctor or care team can help you find a counselor.     Get help if you re thinking about hurting yourself. If you need help right away, call 911 or go to the nearest emergency room. You can also try the Crisis Text Line.    Crisis Text Line: 468-390 (http://www.crisistextline.org)  The Crisis Text Line serves anyone, in any crisis. It gives free, 24/7 support. Here's how it works:  1. Text HOME to 473786 from anywhere in the [place], anytime, about any type of crisis.  2. A live, trained Crisis Counselor will text you back quickly.  3. The volunteer Crisis Counselor can help you move from a  hot moment  to a  cool moment.  They can also  help you work out a safety plan.  COVID-19 Outpatient Treatments  Your care team can help you find the best treatments for COVID-19. Talk to a health care provider or refer to the FDA medicine fact sheets below.  Important: You can't have Paxlovid or molnupiravir if you're starting the medicine more than 5 days after your symptoms have started.  Paxlovid: https://www.fda.gov/media/862922/download  Molnupiravir (Lagevrio): https://www.fda.gov/media/660430/download  Paxlovid (nimatrelvir and ritonavir)  How it works  Two medicines (nirmatrelvir and ritonavir) are taken together. They stop the virus from growing. Less amount of virus is easier for your body to fight.  Benefits  Lowers risk of a hospital stay or death from COVID-19.  How to take  Medicine comes in a daily container with both medicine tablets. Take by mouth twice daily (once in the morning, once at night) for 5 days.  The number of tablets to take varies by patient.  Don't chew or break capsules. Swallow whole.  When to take  Take as soon as possible after positive COVID-19 test result, and within 5 days of your first symptoms.  Who can take it  Patients must be 12 years or older, weigh at least 88 pounds, and have tested positive for COVID-19. Paxlovid is the preferred treatment for pregnant patients.  Possible side effects  Can cause altered sense of taste, diarrhea (loose, watery stools), high blood pressure, muscle aches.  Medicine conflicts  Some medicines may conflict with Paxlovid and may cause serious side effects.  Tell your care team about all the medicines you take, including prescription and over-the-counter medicines, vitamins, and herbal supplements.  Your care team will review your medicines to make sure that you can safely take Paxlovid.  Cautions  Paxlovid is not advised for patients with severe kidney or liver disease. If you have kidney or liver problems, the dose may need to be changed.  If you're pregnant or breastfeeding, talk to  your care team about your options.  If you take hormonal birth control (such as the Pill), then you or your partner should also use a non-hormonal form of birth control (such as a condom). Keep doing this for 1 menstrual cycle after your last dose of Paxlovid.  Molnupiravir (Lagevrio)  How it works  Stops the virus from growing. Less amount of virus is easier for your body to fight.  Benefits  Lowers risk of a hospital stay or death from COVID-19.  How to take  Take 4 capsules by mouth every 12 hours (4 in the morning and 4 at night) for 5 days. Don't chew or break capsules. Swallow whole.  When to take  Take as soon as possible after positive COVID-19 test result, and within 5 days of your first symptoms.  Who can take it  Patients must be 18 years or older and have tested positive for COVID-19.  Possible side effects  Diarrhea (loose, watery stools), nausea (feeling sick to your stomach), dizziness, headaches.  Medicine conflicts  Right now, there are no known conflicts with other drugs. But tell your care team about all medicines you take.  Cautions  This medicine is not advised for patients who are pregnant.  If you are someone who could become pregnant, use trusted birth control until 4 days after your last dose of molnupiravir.  If your partner could become pregnant, you should use trusted birth control until 3 months after your last dose of molnupiravir.  For informational purposes only. Not to replace the advice of your health care provider. Copyright   2022 [facility]. All rights reserved. Clinically reviewed by Catherine Serrano, PharmD, BCACP. [employer] [id number] - REV 12/22.  4.   Coping with Life After COVID-19  Being in the hospital because of COVID-19 is scary. Going home can be scary, too. You may face changes to your life, the way you work or what you can eat. It s hard to adjust to change, and it s normal to feel afraid, frustrated or even angry. These feelings usually go away over time.  If your feelings don t start to get better, it s called  adjustment disorder.      What signs should I look out for?  Adjustment disorder can happen to anyone in a time of stress. It makes it hard to cope with daily life. You may feel lonely or fight with loved ones, even if you re glad to be home. Watch for these signs:  Fear or worry  Hard time focusing  Sadness or anger  Trouble talking to family or friends  Feeling like you don t fit in or isolating yourself  Problems with sleep   Drinking alcohol or taking drugs to cope    What can I do?  You can help yourself get better. Feeling you have control helps you move forward. You may wonder if you ll be able to do things you did before. Be patient. Do your best to make the most of every day. Try to build relationships, be as active as you can, eat right and keep a sense of humor. Avoid smoking and drinking too much alcohol. Call your family doctor or clinic if you re not sure what to do. They can guide you to care or other services.    When should I get help?  Think about getting counseling if your sadness or frustration gets worse. Together with a trained counselor, you can talk about your problems adjusting to life after your hospital stay. You can come up with new ways to handle changes that give you more control. Your family doctor or care team can help you find a counselor.     Get help if you re thinking about hurting yourself. If you need help right away, call 911 or go to the nearest emergency room. You can also try the Crisis Text Line.    Crisis Text Line: 788-290 (http://www.crisistextline.org)  The Crisis Text Line serves anyone, in any crisis. It gives free, 24/7 support. Here's how it works:  Text HOME to 944278 from anywhere in the [place], anytime, about any type of crisis.  A live, trained Crisis Counselor will text you back quickly.  The volunteer Crisis Counselor can help you move from a  hot moment  to a  cool moment.  They can also help you work out  a safety plan.

## 2023-03-28 DIAGNOSIS — F41.9 ANXIETY: ICD-10-CM

## 2023-03-28 DIAGNOSIS — G47.00 INSOMNIA, UNSPECIFIED TYPE: ICD-10-CM

## 2023-03-29 RX ORDER — BUPROPION HYDROCHLORIDE 150 MG/1
TABLET, EXTENDED RELEASE ORAL
Qty: 60 TABLET | Refills: 1 | Status: SHIPPED | OUTPATIENT
Start: 2023-03-29 | End: 2023-09-08

## 2023-03-29 RX ORDER — ESZOPICLONE 2 MG/1
TABLET, FILM COATED ORAL
Qty: 30 TABLET | Refills: 1 | Status: SHIPPED | OUTPATIENT
Start: 2023-03-29 | End: 2023-06-01

## 2023-05-04 NOTE — TELEPHONE ENCOUNTER
Per Dr. Odonnell refilled Astelin and sent to pt pharmacy.    [facility]      Brenda Waters RN  [facility]  ENT  [address] [address]  [email or web address]   Office:[phone]  Employed by [facility]       
Photo Preface (Leave Blank If You Do Not Want): Photographs were obtained today for clinical monitoring
Detail Level: Simple

## 2023-06-20 ASSESSMENT — ANXIETY QUESTIONNAIRES
5. BEING SO RESTLESS THAT IT IS HARD TO SIT STILL: NOT AT ALL
2. NOT BEING ABLE TO STOP OR CONTROL WORRYING: MORE THAN HALF THE DAYS
7. FEELING AFRAID AS IF SOMETHING AWFUL MIGHT HAPPEN: SEVERAL DAYS
3. WORRYING TOO MUCH ABOUT DIFFERENT THINGS: MORE THAN HALF THE DAYS
7. FEELING AFRAID AS IF SOMETHING AWFUL MIGHT HAPPEN: SEVERAL DAYS
IF YOU CHECKED OFF ANY PROBLEMS ON THIS QUESTIONNAIRE, HOW DIFFICULT HAVE THESE PROBLEMS MADE IT FOR YOU TO DO YOUR WORK, TAKE CARE OF THINGS AT HOME, OR GET ALONG WITH OTHER PEOPLE: SOMEWHAT DIFFICULT
GAD7 TOTAL SCORE: 8
8. IF YOU CHECKED OFF ANY PROBLEMS, HOW DIFFICULT HAVE THESE MADE IT FOR YOU TO DO YOUR WORK, TAKE CARE OF THINGS AT HOME, OR GET ALONG WITH OTHER PEOPLE?: SOMEWHAT DIFFICULT
4. TROUBLE RELAXING: SEVERAL DAYS
6. BECOMING EASILY ANNOYED OR IRRITABLE: SEVERAL DAYS
GAD7 TOTAL SCORE: 8
1. FEELING NERVOUS, ANXIOUS, OR ON EDGE: SEVERAL DAYS

## 2023-06-21 ENCOUNTER — OFFICE VISIT (OUTPATIENT)
Dept: FAMILY MEDICINE | Facility: CLINIC | Age: 58
End: 2023-06-21
Payer: COMMERCIAL

## 2023-06-21 ENCOUNTER — LAB (OUTPATIENT)
Dept: LAB | Facility: CLINIC | Age: 58
End: 2023-06-21
Payer: COMMERCIAL

## 2023-06-21 VITALS
DIASTOLIC BLOOD PRESSURE: 76 MMHG | RESPIRATION RATE: 16 BRPM | TEMPERATURE: 97.7 F | BODY MASS INDEX: 24.22 KG/M2 | WEIGHT: 178.8 LBS | OXYGEN SATURATION: 98 % | HEIGHT: 72 IN | HEART RATE: 71 BPM | SYSTOLIC BLOOD PRESSURE: 114 MMHG

## 2023-06-21 DIAGNOSIS — M94.9 DISORDER OF BONE AND CARTILAGE: ICD-10-CM

## 2023-06-21 DIAGNOSIS — M89.9 DISORDER OF BONE AND CARTILAGE: ICD-10-CM

## 2023-06-21 DIAGNOSIS — F41.9 ANXIETY: Primary | ICD-10-CM

## 2023-06-21 DIAGNOSIS — N52.9 ERECTILE DYSFUNCTION, UNSPECIFIED ERECTILE DYSFUNCTION TYPE: ICD-10-CM

## 2023-06-21 DIAGNOSIS — Z12.11 COLON CANCER SCREENING: ICD-10-CM

## 2023-06-21 DIAGNOSIS — J34.3 NASAL TURBINATE HYPERTROPHY: ICD-10-CM

## 2023-06-21 DIAGNOSIS — G47.00 INSOMNIA, UNSPECIFIED TYPE: ICD-10-CM

## 2023-06-21 PROCEDURE — 99214 OFFICE O/P EST MOD 30 MIN: CPT | Performed by: FAMILY MEDICINE

## 2023-06-21 PROCEDURE — 36415 COLL VENOUS BLD VENIPUNCTURE: CPT

## 2023-06-21 PROCEDURE — 82306 VITAMIN D 25 HYDROXY: CPT

## 2023-06-21 RX ORDER — BUPROPION HYDROCHLORIDE 150 MG/1
TABLET, EXTENDED RELEASE ORAL
Qty: 60 TABLET | Refills: 1 | Status: CANCELLED | OUTPATIENT
Start: 2023-06-21

## 2023-06-21 RX ORDER — SILDENAFIL CITRATE 20 MG/1
10 TABLET ORAL PRN
Qty: 30 TABLET | Refills: 1 | Status: SHIPPED | OUTPATIENT
Start: 2023-06-21 | End: 2024-07-24

## 2023-06-21 RX ORDER — ALENDRONATE SODIUM 70 MG/1
TABLET ORAL
Qty: 12 TABLET | Refills: 1 | Status: SHIPPED | OUTPATIENT
Start: 2023-06-21 | End: 2024-07-24

## 2023-06-21 RX ORDER — ALPRAZOLAM 0.25 MG
0.25 TABLET ORAL DAILY PRN
Qty: 30 TABLET | Refills: 3 | Status: SHIPPED | OUTPATIENT
Start: 2023-06-21 | End: 2024-07-24

## 2023-06-21 RX ORDER — ESZOPICLONE 2 MG/1
2 TABLET, FILM COATED ORAL AT BEDTIME
Qty: 90 TABLET | Refills: 3 | Status: SHIPPED | OUTPATIENT
Start: 2023-06-21 | End: 2024-03-11

## 2023-06-21 ASSESSMENT — PAIN SCALES - GENERAL: PAINLEVEL: NO PAIN (0)

## 2023-06-21 ASSESSMENT — ANXIETY QUESTIONNAIRES: GAD7 TOTAL SCORE: 8

## 2023-06-21 NOTE — PROGRESS NOTES
Assessment & Plan     Anxiety  Patient weaned himself off of Celexa and Wellbutrin, is feeling better at this point without them.  Encourage sparingly use of Xanax.  No more than once a week as needed  - ALPRAZolam (XANAX) 0.25 MG tablet  Dispense: 30 tablet; Refill: 3  - Adult Mental Health  Referral    Disorder of bone and cartilage  Due for repeat DEXA this fall.  At that time can reassess if alendronate is needed or if time for holiday  - alendronate (FOSAMAX) 70 MG tablet  Dispense: 12 tablet; Refill: 1  - Vitamin D Deficiency  - DX Hip/Pelvis/Spine    Insomnia, unspecified type  Would be interested in meeting with sleep medicine again.  Discussed ongoing daytime fatigue.  Encouraged regular exercise.  Referral placed for sleep medicine  - Adult Sleep Eval & Management  Referral  - eszopiclone (LUNESTA) 2 MG tablet  Dispense: 90 tablet; Refill: 3    Erectile dysfunction, unspecified erectile dysfunction type  Using as needed  - sildenafil (REVATIO) 20 MG tablet  Dispense: 30 tablet; Refill: 1    Colon cancer screening  Overdue  - Colonoscopy Screening  Referral    Nasal turbinate hypertrophy  - Adult ENT  Referral      35 minutes spent by me on the date of the encounter doing chart review, patient visit and documentation     Follow-up for annual physical in November, 2023    [facility]   Elvis is a 58 year old, presenting for the following health issues:  Hypertension, Lipids, Depression, Anxiety, and Sleep Problem        6/21/2023     1:20 PM   Additional Questions   Roomed by Deyanira ANNE MA   Accompanied by Self     History of Present Illness       Mental Health Follow-up:  Patient presents to follow-up on Depression & Anxiety.Patient's depression since last visit has been:  Medium  The patient is not having other symptoms associated with depression.  Patient's anxiety since last visit has been:  Medium  The  "patient is not having other symptoms associated with anxiety.  Any significant life events: No  Patient is feeling anxious or having panic attacks.  Patient has no concerns about alcohol or drug use.    Hyperlipidemia:  He presents for follow up of hyperlipidemia.  He is taking medication to lower cholesterol. He is not having myalgia or other side effects to statin medications.    Hypertension: He presents for follow up of hypertension.  He does not check blood pressure  regularly outside of the clinic. Outside blood pressures have been over 140/90. He does not follow a low salt diet. He consumes 1 sweetened beverage(s) daily.He exercises with enough effort to increase his heart rate 20 to 29 minutes per day.  He exercises with enough effort to increase his heart rate 5 days per week.   He is taking medications regularly.  Today's EBONY-7 Score: 8         6/29/2021    11:00 AM 4/11/2022     9:34 AM 1/10/2023     6:08 PM   PHQ   PHQ-9 Total Score 6 7 3   Q9: Thoughts of better off dead/self-harm past 2 weeks Not at all Not at all Not at all         4/11/2022     9:35 AM 1/10/2023     6:08 PM 6/20/2023     8:32 PM   EBONY-7 SCORE   Total Score 7 (mild anxiety) 3 (minimal anxiety) 8 (mild anxiety)   Total Score 7 3 8       Colonoscopy-[facility] report states repeat in 5 years, colonoscopy done on 6/26/2017.  Needs new referral    Instead of going in to do a sleep study, wondering if he can do at home. Did see a sleep specialist in 2019 in [place].     Wondering of a good ENT to see as well, had nasal surgery a little over a year ago and doesn't feel it did much.  Is still plugged    Stopped taking wellbutrin about 4-5 weeks ago.  Stopped taking Citalopram 3 weeks prior to stopping Wellbutrin.  Says it makes him feel \"groggy\"--tried paxil in the past, venlafaxine     2007-fell of of ladder, noted to have osteopenia on his imaging.  Has been on alendronate he believes over 5 years.  Taking vitamin D3 and getting calcium in his " diet    Review of Systems   Constitutional, HEENT, cardiovascular, pulmonary, gi and gu systems are negative, except as otherwise noted.      Objective    /76 (BP Location: Right arm, Patient Position: Chair, Cuff Size: Adult Regular)   Pulse 71   Temp 97.7  F (36.5  C) (Tympanic)   Resp 16   Ht 1.829 m (6')   Wt 81.1 kg (178 lb 12.8 oz)   SpO2 98%   BMI 24.25 kg/m    Body mass index is 24.25 kg/m .  Physical Exam  Constitutional:       Appearance: Normal appearance.   HENT:      Right Ear: Tympanic membrane normal.      Left Ear: Tympanic membrane normal.      Nose: Nose normal.   Eyes:      Conjunctiva/sclera: Conjunctivae normal.   Cardiovascular:      Rate and Rhythm: Normal rate and regular rhythm.      Pulses: Normal pulses.   Pulmonary:      Effort: Pulmonary effort is normal.      Breath sounds: Normal breath sounds.   Abdominal:      General: Bowel sounds are normal.   Musculoskeletal:      Right lower leg: No edema.      Left lower leg: No edema.   Skin:     General: Skin is warm and dry.   Neurological:      Mental Status: He is alert.   Psychiatric:         Thought Content: Thought content normal.         Judgment: Judgment normal.

## 2023-06-21 NOTE — PATIENT INSTRUCTIONS
Physical in the fall    Plan to ride your bike 5 days a week for 15 minutes, try this for 2 to 4 weeks and see if you notice improvement in energy level

## 2023-06-22 LAB — DEPRECATED CALCIDIOL+CALCIFEROL SERPL-MC: 51 UG/L (ref 20–75)

## 2023-07-06 ENCOUNTER — MYC MEDICAL ADVICE (OUTPATIENT)
Dept: GASTROENTEROLOGY | Facility: CLINIC | Age: 58
End: 2023-07-06
Payer: COMMERCIAL

## 2023-07-10 NOTE — TELEPHONE ENCOUNTER
Screening Questions  BLUE  KIND OF PREP RED  LOCATION [review exclusion criteria] GREEN  SEDATION TYPE        y Are you active on mychart?       Anne Ordering/Referring Provider?        BCBS What type of coverage do you have?      n Have you had a positive covid test in the last 14 days?     24.25 1. BMI  [BMI 40+ - review exclusion criteria& smart-phrase document]    y  2. Are you able to give consent for your medical care? [IF NO,RN REVIEW]          n  3. Are you taking any prescription pain medications on a routine schedule   (ex narcotics: oxycodone, roxicodone, oxycontin,  and percocet)? [RN Review]        n  3a. EXTENDED PREP What kind of prescription?     n 4. Do you have any chemical dependencies such as alcohol, street drugs, or methadone?        **If yes 3- 5 , please schedule with MAC sedation.**          IF YES TO ANY 6 - 10 - HOSPITAL SETTING ONLY.     n 6.   Do you need assistance transferring?     n 7.   Have you had a heart or lung transplant?    n 8.   Are you currently on dialysis?   n 9.   Do you use daily home oxygen?   n 10. Do you take nitroglycerin?   10a. n If yes, how often?     n 11. Are you currently pregnant?    11a. n If yes, how many weeks? [ Greater than 12 weeks, OR NEEDED]    n 12. Do you have Pulmonary Hypertension? *NEED PAC APPT AT [facility] w/ [facility]*     n 13. [review exclusion criteria]  Do you have any implantable devices in your body (pacemaker, defib, LVAD)?    n 14. In the past 6 months, have you had any heart related issues including cardiomyopathy or heart attack?     14a. n If yes, did it require cardiac stenting if so when?     n 15. Have you had a stroke or Transient ischemic attack (TIA - aka  mini stroke ) within 6 months?      n 16. Do you have mod to severe Obstructive Sleep Apnea?  [Hospital only]    n 17. Do you have SEVERE AND UNCONTROLLED asthma? *NEED PAC APPT AT [facility] w/MAC*     18.Do you take blood thinners?  No    n 19. Do you take any of the following  "medications?    nPhentermine    nOzempic    nWegovy (Semaglutide)      19a. If yes, \"Hold for 7 days before procedure.  Please consult your prescribing provider if you have questions about holding this medication.\"     n  20. Do you have chronic kidney disease?      n  21. Do you have a diagnosis of diabetes?     n  22. On a regular basis do you go 3-5 days between bowel movements?     y 23. Preferred LOCAL Pharmacy for Pre Prescription         [id number] - [facility] [address] AT [facility] OF [facility] & [address]      - CLOSING REMINDERS -  You will receive a call from a Nurse to review instructions and health history.  This assessment must be completed prior to your procedure.  Failure to complete the Nurse assessment may result in the procedure being cancelled.    On the day of your procedure, please designatean adult(s) who can drive you home stay with you for the next 24 hours. The medicines used in the exam will make you sleepy. You will not be able to drive.    You cannot take public transportation, ride share services, or non-medical taxi service without a responsible caregiver.  Medical transport services are allowed with the requirement that a responsible caregiver will receive you at your destination.  We require that drivers and caregivers are confirmed prior to your procedure.      - SCHEDULING DETAILS -  n & n Hospital Setting Required & If yes, what is the exclusion?   Anabel  Surgeon    10/9/23  Date of Procedure  Lower Endoscopy [Colonoscopy]  Type of Procedure Scheduled  [facility]- If you answer yes to questions #8, #20, #21 [  pts ]Which Colonoscopy Prep was Sent?     General  Sedation Type     n PAC / Pre-op Required      "

## 2023-07-18 ENCOUNTER — HOSPITAL ENCOUNTER (OUTPATIENT)
Dept: BONE DENSITY | Facility: CLINIC | Age: 58
Discharge: HOME | End: 2023-07-18
Attending: FAMILY MEDICINE | Admitting: FAMILY MEDICINE
Payer: COMMERCIAL

## 2023-07-18 DIAGNOSIS — M89.9 DISORDER OF BONE AND CARTILAGE: ICD-10-CM

## 2023-07-18 DIAGNOSIS — M94.9 DISORDER OF BONE AND CARTILAGE: ICD-10-CM

## 2023-07-18 PROCEDURE — 77080 DXA BONE DENSITY AXIAL: CPT

## 2023-07-24 ENCOUNTER — TRANSFERRED RECORDS (OUTPATIENT)
Dept: HEALTH INFORMATION MANAGEMENT | Facility: CLINIC | Age: 58
End: 2023-07-24
Payer: COMMERCIAL

## 2023-07-30 ASSESSMENT — PATIENT HEALTH QUESTIONNAIRE - PHQ9
10. IF YOU CHECKED OFF ANY PROBLEMS, HOW DIFFICULT HAVE THESE PROBLEMS MADE IT FOR YOU TO DO YOUR WORK, TAKE CARE OF THINGS AT HOME, OR GET ALONG WITH OTHER PEOPLE: SOMEWHAT DIFFICULT
SUM OF ALL RESPONSES TO PHQ QUESTIONS 1-9: 8
SUM OF ALL RESPONSES TO PHQ QUESTIONS 1-9: 8

## 2023-07-31 ENCOUNTER — VIRTUAL VISIT (OUTPATIENT)
Dept: PSYCHIATRY | Facility: CLINIC | Age: 58
End: 2023-07-31
Attending: FAMILY MEDICINE
Payer: COMMERCIAL

## 2023-07-31 DIAGNOSIS — G47.00 INSOMNIA, UNSPECIFIED TYPE: ICD-10-CM

## 2023-07-31 DIAGNOSIS — F41.9 ANXIETY: ICD-10-CM

## 2023-07-31 DIAGNOSIS — F33.0 MAJOR DEPRESSIVE DISORDER, RECURRENT EPISODE, MILD (H): Primary | ICD-10-CM

## 2023-07-31 PROCEDURE — 99204 OFFICE O/P NEW MOD 45 MIN: CPT | Mod: VID | Performed by: PSYCHIATRY & NEUROLOGY

## 2023-07-31 RX ORDER — TRAZODONE HYDROCHLORIDE 100 MG/1
TABLET ORAL
Qty: 90 TABLET | Refills: 0 | Status: SHIPPED | OUTPATIENT
Start: 2023-07-31 | End: 2023-09-08 | Stop reason: SINTOL

## 2023-07-31 RX ORDER — VILAZODONE HYDROCHLORIDE 10 MG/1
10 TABLET ORAL DAILY
Qty: 30 TABLET | Refills: 1 | Status: SHIPPED | OUTPATIENT
Start: 2023-07-31 | End: 2023-09-08

## 2023-07-31 NOTE — Clinical Note
Dr. Anne,  Thank you for your consult and care of the patient.  Started Viibryd and referred for Pharmacogenomic testing in house (via smartset available to Primary Care as well if you are interested in using).   Sincerely, Austin Jackson M.D. Consultative Psychiatrist Program Medical Director, Lead Collaborative Care Psychiatry Service

## 2023-07-31 NOTE — NURSING NOTE
Is the patient currently in the state of [place]? YES - at home.    Visit mode:VIDEO    If the visit is dropped, the patient can be reconnected by: VIDEO VISIT: Text to cell phone:   Telephone Information:   Mobile [phone]       Will anyone else be joining the visit? No  (If patient encounters technical issues they should call [phone])    How would you like to obtain your AVS? MyChart    Are changes needed to the allergy or medication list? Yes PT reported not longer taking azelastine nasal spray, bupropion 150 mg, citalopram 10 mg, and hypertonic nasal wash bottle. Pt requesting for medication removal, will msg provider regarding pt's concern.    Rooming Documentation: Attendance Guidelines - Care team has reviewed attendance agreement with patient. Patient advised that two failed appointments within 6 months may lead to termination of current episode of care.       Reason for visit: SANTOSH Turner, KAMILAF

## 2023-07-31 NOTE — PATIENT INSTRUCTIONS
"Patient Education   Collaborative Care Psychiatry Service  What to Expect  Here's what to expect from your Collaborative Care Psychiatry Service (CCPS).   About CCPS  CCPS means 2 people work together to help you get better. You'll meet with a behavioral health clinician and a psychiatric doctor. A behavioral health clinician helps people with mental health problems by talking with them. A psychiatric doctor helps people by giving them medicine.  How it works  At every visit, you'll see the behavioral health clinician (BHC) first. They'll talk with you about how you're doing and teach you how to feel better.   Then you'll see the psychiatric doctor. This doctor can help you deal with troubling thoughts and feelings by giving you medicine. They'll make sure you know the plan for your care.   CCPS usually takes 3 to 6 visits. If you need more visits, we may have you start seeing a different psychiatric doctor for ongoing care.  If you have any questions or concerns, we'll be glad to talk with you.  About visits  Be open  At your visits, please talk openly about your problems. It may feel hard, but it's the best way for us to help you.  Cancelling visits  If you can't come to your visit, please call us right away at [phone]. If you don't cancel at least 24 hours (1 full day) before your visit, that's \"late cancellation.\"  Being late to visits  Being very late is the same as not showing up. You will be a \"no show\" if:  Your appointment starts with a BHC, and you're more than 15 minutes late for a 30-minute (half hour) visit. This will also cancel your appointment with the psychiatric doctor.  Your appointment is with a psychiatric doctor only, and you're more than 15 minutes late for a 30-minute (half hour) visit.  Your appointment is with a psychiatric doctor only, and you're more than 30 minutes late for a 60-minute (full hour) visit.  If you cancel late or don't show up 2 times within 6 months, we may end your " care.   Getting help between visits  If you need help between visits, you can call us Monday to Friday from 8 a.m. to 4:30 p.m. at [phone].  Emergency care  Call 911 or go to the nearest emergency department if your life or someone else's life is in danger.  Call 988 anytime to reach the national Suicide and Crisis hotline.  Medicine refills  To refill your medicine, call your pharmacy. You can also call [facility]'s [facility] at [phone], Monday to Friday, 8 a.m. to 4:30 p.m. It can take 1 to 3 business days to get a refill.   Forms, letters, and tests  You may have papers to fill out, like FMLA, short-term disability, and workability. We can help you with these forms at your visits, but you must have an appointment. You may need more than 1 visit for this, to be in an intensive therapy program, or both.  Before we can give you medicine for ADHD, we may refer you to get tested for it or confirm it another way.  We may not be able to give you an emotional support animal letter.  We don't do mental health checks ordered by the court.   We don't do mental health testing, but we can refer you to get tested.   Thank you for choosing us for your care.  For informational purposes only. Not to replace the advice of your health care provider. Copyright   2022 [facility]. All rights reserved. [employer] [id number] - 12/22.
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
## 2023-08-01 ENCOUNTER — TELEPHONE (OUTPATIENT)
Dept: PSYCHIATRY | Facility: CLINIC | Age: 58
End: 2023-08-01
Payer: COMMERCIAL

## 2023-08-01 NOTE — TELEPHONE ENCOUNTER
Prior Authorization Retail Medication Request    Medication/Dose: vilazodone (VIIBRYD) 10 MG TABS tablet  ICD code (if different than what is on RX):    Previously Tried and Failed:    Rationale:      Insurance Name:  [employer] Blue [employer]  Insurance ID:  Key:[id number]      Pharmacy Information (if different than what is on RX)  Name:  [facility]  Phone:  [phone]        NIMO VILLEDA RN on 8/1/2023 at 10:20 AM

## 2023-08-03 NOTE — TELEPHONE ENCOUNTER
Prior Authorization Approval    Medication: VILAZODONE HCL 10 MG PO TABS  Authorization Effective Date: 8/3/2023  Authorization Expiration Date: 8/3/2024  Approved Dose/Quantity:   Reference #:     Insurance Company: [employer] - Phone [phone] Fax [phone]  Expected CoPay:       CoPay Card Available:      Financial Assistance Needed:   Which Pharmacy is filling the prescription: [id number] - [address] AT [address]  Pharmacy Notified: Yes  Patient Notified: No  **Instructed pharmacy to notify patient when script is ready to /ship.**

## 2023-08-03 NOTE — TELEPHONE ENCOUNTER
Central Prior Authorization Team   Phone: [phone]    PA Initiation    Medication: VILAZODONE HCL 10 MG PO TABS  Insurance Company: [employer] Clinical Review - Phone [phone] Fax [phone]  Pharmacy Filling the Rx: [facility] #[id number] [address] AT [facility] OF [address] [place] [address]  Filling Pharmacy Phone: [phone]  Filling Pharmacy Fax:    Start Date: 8/3/2023

## 2023-08-24 NOTE — PROGRESS NOTES
GENETIC COUNSELING CONSULTATION NOTE    Date of visit: 08/24/23    Presenting Information:   Brennan Vazquez is a 58 year old male referred to the [facility] at the request of Dr. Austin Jackson today. Brennan was seen for a genetic counseling appointment to discuss the details of pharmacogenomic testing and to coordinate this testing.     Prior Medication Trials:  Sertraline- felt like ants in pants  Citalopram - didn't make him feel good, maybe felt more down; strong sexual dysfunction  Paroxetine - benefits but with titration didn't feel as good  Venlafaxine - may have led to feeling worse  Bupropion - did not feel good  Recalls a couple of trials with gut pain  Zolpidem  esczopiclone    Current Medications:  Current Outpatient Medications   Medication    alendronate (FOSAMAX) 70 MG tablet    ALPRAZolam (XANAX) 0.25 MG tablet    aspirin 81 MG EC tablet    atenolol (TENORMIN) 25 MG tablet    buPROPion (WELLBUTRIN SR) 150 MG 12 hr tablet    calcium carbonate-vitamin D3 (CALTRATE 600 PLUS D3) 600 mg(1,500mg) -400 unit per tablet    citalopram (CELEXA) 10 MG tablet    eszopiclone (LUNESTA) 2 MG tablet    multivitamin therapeutic tablet    sildenafil (REVATIO) 20 MG tablet    simvastatin (ZOCOR) 10 MG tablet    traZODone (DESYREL) 100 MG tablet    vilazodone (VIIBRYD) 10 MG TABS tablet    vitamin B-12 (CYANOCOBALAMIN) 100 MCG tablet     No current facility-administered medications for this visit.      Family History: A three generation pedigree was obtained and scanned into the electronic medical record. The relevant portions are described below:    Children- Elvis has two daughters. One daughter has anxiety. That same daughter has two children including a daughter with anxiety. Elvis's other daughter does not have children.  Siblings- Elvis has a younger full sister who has a son and daughter. All are well.  Parents- Elvis's mother is turning 80 years old next month and is well aside from high blood pressure  and high cholesterol. Elvis's father is 82 years old and is also well aside from high blood pressure and high cholesterol.  Maternal Relatives- Elvis has 9 aunts and uncles on his mother's side. Three of those individuals are still alive. One uncle passed away at 29 years old from muscular dystrophy (which had onset by 3 years old). No other family members have been affected. Another uncle passed away young from gun violence. Elvis's grandfather passed away at 57 years old from a heart attack thought to be due to infection post leg amputation (due to  service). Elvis's grandmother passed at 84 years old.  Paternal Relatives- Elvis's father has 4 siblings, all of whom are well. Elvis's grandfather passed in his late 70s and grandmother passed at 96 years old.    Family history is otherwise largely non-contributory.     Genetic Counseling Discussion:  For review, our bodies are made of cells that contain our chromosomes which are made up of long stretches of DNA containing our genes. Our genes serve as the instructions for our bodies to grow and function. We have two copies of each gene, one inherited from our mother and one inherited from our father.     What pharmacogenomic testing can tell us:  There are several factors that can determine how an individual responds to medications. Some of these factors include environmental factors such as lifestyle choices (diet, alcohol and/or tobacco use) or other medications an individual might be taking, and other factors can include a person's age, sex, ethnic background, disease, and underlying genetic factors. There are several genes in our body that provide instructions for breaking down the medications we take. Changes in these genes (sometimes called variants or polymorphisms/SNPs) can alter how the body breaks down certain medications. For example, a change in a gene can slow down the process of medication breakdown leading to too much of that medication/drug in the body  which can cause side effects. On the other hand, a change in a gene can speed up the breakdown of a medication so a therapeutic level is not reached and the medication may not work well at the standard doses. Therefore, identifying changes in these genes via pharmacogenomic testing can help guide which medications might work best for an individual, what dose of a medication may be best, or if a certain medication has a high risk for causing serious side effects.     The pharmacogenomic testing offered at [facility] analyzes several different polymorphisms in different genes associated with drug metabolism. These variants have been determined to be medically actionable by practice guidelines including CPIC (Clinical Pharmacogenetics Implementation Consortium), PharmGKB and/or FDA gene-drug interaction guidelines. Therefore, prescribing recommendations can be made by the results of this test, so this testing for Brennan is DIAGNOSTIC and is NOT investigational.     The results of this test can provide guidance for several different types of drugs such as antiinflammatories, antithrombotics, lipid-lowering medication, acid-lowering medications, antiemetics, immunosuppressants, antivirals, antifungals, antidepressants, ADHD medications, antimetabolites, and/or hormone antagonists. This means that, while this testing was recommended for a specific reason right now (Brennan's anxiety), it may provide guidance for future medication use.     As previously mentioned, we inherit our genes from our parents. This means that some of the pharmacogenomic variants found on this test may be shared by other relatives. These results could be helpful to share with other relatives, but it is important to remember that there are many factors that can contribute to how an individual responds to medications. Relatives should consider their own pharmacogenomics testing to better determine their recommendations and they should consult  with their health care providers before making any changes.     What pharmacogenomic testing cannot tell us:  This pharmacogenomic testing is not looking at every known pharmacogenomic polymorphism and therefore the results cannot tell us about every possible gene-drug interaction. It is also not looking at genes outside of the scope of pharmacogenomics, and therefore, the results will not tell us if Brennan is at risk for other genetic conditions. If Brennan has questions about a possible underlying genetic condition, he should talk with his primary care provider about seeking an appointment in our Genetics Clinic.     Testing logistics:  [facility] will try to obtain insurance coverage for the pharmacogenomic testing, however this is not always a covered service. A cost waiver form (Medicare replacement non-coverage form) is required, but cost to the patient is not expected to exceed $350.     A blood or buccal sample will be collected for DNA analysis. The results of this test take 1-2 weeks. An appointment will be made with the pharmacist to discuss these results in detail and to discuss the medication recommendations based on these results. These test results will be accessible in [email or web address] and may be viewable prior to the appointment with the pharmacist, but NO CHANGES SHOULD BE MADE TO MEDICATIONS BEFORE TALKING TO THE PHARMACIST OR HEALTHCARE PROVIDER.     Brennan consented to pharmacogenomic testing today. The insurance and billing were explained and Brennan agreed to the billing plan. Due to the fact that this was a virtual visit, Brennan gave me verbal permission/consent to sign the genetic testing consent form and the cost waiver form (Medicare replacement non-coverage form) on their behalf.     It was a pleasure meeting Brennan today. He was encouraged to reach out to me if he has any further questions.     Plan:  Brennan will arrange a lab appointment at a [facility] laboratory to have a blood sample drawn  for the [facility] Pharmacogenomic Test  Brennan was given the phone number to call to make the results appointment with the [facility] pharmacist ([phone]). he was instructed to call to schedule once he has his blood drawn. If he does not call to schedule someone will reach out to schedule when the Pharmacogenomics Test is completed.     Parul Odonnell MS, [facility]  Genetic Counseling  [facility]  Email: [email or web address]  Phone: [phone]  Fax: [phone]      Time spent in consultation face to face was approximately 20 minutes.    Virtual Visit Details    Type of service:  Video Visit     Originating Location (pt. Location): Home    Distant Location (provider location):  On-site  Platform used for Video Visit: Gina

## 2023-08-28 ENCOUNTER — VIRTUAL VISIT (OUTPATIENT)
Dept: CONSULT | Facility: CLINIC | Age: 58
End: 2023-08-28
Attending: PSYCHIATRY & NEUROLOGY
Payer: COMMERCIAL

## 2023-08-28 VITALS — WEIGHT: 175 LBS | BODY MASS INDEX: 23.7 KG/M2 | HEIGHT: 72 IN

## 2023-08-28 DIAGNOSIS — F41.9 ANXIETY: ICD-10-CM

## 2023-08-28 DIAGNOSIS — Z78.9 LACK OF DRUG EFFECT (PROPERLY PRESCRIBED AND ADMINISTERED): Primary | ICD-10-CM

## 2023-08-28 DIAGNOSIS — F33.0 MAJOR DEPRESSIVE DISORDER, RECURRENT EPISODE, MILD (H): ICD-10-CM

## 2023-08-28 DIAGNOSIS — Z71.83 ENCOUNTER FOR NONPROCREATIVE GENETIC COUNSELING AND TESTING: ICD-10-CM

## 2023-08-28 DIAGNOSIS — Z13.71 ENCOUNTER FOR NONPROCREATIVE GENETIC COUNSELING AND TESTING: ICD-10-CM

## 2023-08-28 PROCEDURE — 96040 HC GENETIC COUNSELING, EACH 30 MINUTES: CPT | Mod: GT,95

## 2023-08-28 ASSESSMENT — PAIN SCALES - GENERAL: PAINLEVEL: NO PAIN (0)

## 2023-08-28 NOTE — LETTER
8/28/2023      RE: Brennan Vazquez  [address]  [address]  [place] [address]     Dear Colleague,    Thank you for the opportunity to participate in the care of your patient, Brennan Vazquez, at the [facility] at [facility]. Please see a copy of my visit note below.    GENETIC COUNSELING CONSULTATION NOTE    Date of visit: 08/24/23    Presenting Information:   Brennan Vazquez is a 58 year old male referred to the [facility] at the request of Dr. Austin Jackson today. Brennan was seen for a genetic counseling appointment to discuss the details of pharmacogenomic testing and to coordinate this testing.     Prior Medication Trials:  Sertraline- felt like ants in pants  Citalopram - didn't make him feel good, maybe felt more down; strong sexual dysfunction  Paroxetine - benefits but with titration didn't feel as good  Venlafaxine - may have led to feeling worse  Bupropion - did not feel good  Recalls a couple of trials with gut pain  Zolpidem  esczopiclone    Current Medications:  Current Outpatient Medications   Medication    alendronate (FOSAMAX) 70 MG tablet    ALPRAZolam (XANAX) 0.25 MG tablet    aspirin 81 MG EC tablet    atenolol (TENORMIN) 25 MG tablet    buPROPion (WELLBUTRIN SR) 150 MG 12 hr tablet    calcium carbonate-vitamin D3 (CALTRATE 600 PLUS D3) 600 mg(1,500mg) -400 unit per tablet    citalopram (CELEXA) 10 MG tablet    eszopiclone (LUNESTA) 2 MG tablet    multivitamin therapeutic tablet    sildenafil (REVATIO) 20 MG tablet    simvastatin (ZOCOR) 10 MG tablet    traZODone (DESYREL) 100 MG tablet    vilazodone (VIIBRYD) 10 MG TABS tablet    vitamin B-12 (CYANOCOBALAMIN) 100 MCG tablet     No current facility-administered medications for this visit.      Family History: A three generation pedigree was obtained and scanned into the electronic medical record. The relevant portions are described  below:    Children- Elvis has two daughters. One daughter has anxiety. That same daughter has two children including a daughter with anxiety. Elvis's other daughter does not have children.  Siblings- Elvis has a younger full sister who has a son and daughter. All are well.  Parents- Elvis's mother is turning 80 years old next month and is well aside from high blood pressure and high cholesterol. Elvis's father is 82 years old and is also well aside from high blood pressure and high cholesterol.  Maternal Relatives- Elvis has 9 aunts and uncles on his mother's side. Three of those individuals are still alive. One uncle passed away at 29 years old from muscular dystrophy (which had onset by 3 years old). No other family members have been affected. Another uncle passed away young from gun violence. Elvis's grandfather passed away at 57 years old from a heart attack thought to be due to infection post leg amputation (due to  service). Elvis's grandmother passed at 84 years old.  Paternal Relatives- Elvis's father has 4 siblings, all of whom are well. Elvis's grandfather passed in his late 70s and grandmother passed at 96 years old.    Family history is otherwise largely non-contributory.     Genetic Counseling Discussion:  For review, our bodies are made of cells that contain our chromosomes which are made up of long stretches of DNA containing our genes. Our genes serve as the instructions for our bodies to grow and function. We have two copies of each gene, one inherited from our mother and one inherited from our father.     What pharmacogenomic testing can tell us:  There are several factors that can determine how an individual responds to medications. Some of these factors include environmental factors such as lifestyle choices (diet, alcohol and/or tobacco use) or other medications an individual might be taking, and other factors can include a person's age, sex, ethnic background, disease, and underlying genetic factors. There  are several genes in our body that provide instructions for breaking down the medications we take. Changes in these genes (sometimes called variants or polymorphisms/SNPs) can alter how the body breaks down certain medications. For example, a change in a gene can slow down the process of medication breakdown leading to too much of that medication/drug in the body which can cause side effects. On the other hand, a change in a gene can speed up the breakdown of a medication so a therapeutic level is not reached and the medication may not work well at the standard doses. Therefore, identifying changes in these genes via pharmacogenomic testing can help guide which medications might work best for an individual, what dose of a medication may be best, or if a certain medication has a high risk for causing serious side effects.     The pharmacogenomic testing offered at [facility] analyzes several different polymorphisms in different genes associated with drug metabolism. These variants have been determined to be medically actionable by practice guidelines including CPIC (Clinical Pharmacogenetics Implementation Consortium), PharmGKB and/or FDA gene-drug interaction guidelines. Therefore, prescribing recommendations can be made by the results of this test, so this testing for Brennan is DIAGNOSTIC and is NOT investigational.     The results of this test can provide guidance for several different types of drugs such as antiinflammatories, antithrombotics, lipid-lowering medication, acid-lowering medications, antiemetics, immunosuppressants, antivirals, antifungals, antidepressants, ADHD medications, antimetabolites, and/or hormone antagonists. This means that, while this testing was recommended for a specific reason right now (Brennan's anxiety), it may provide guidance for future medication use.     As previously mentioned, we inherit our genes from our parents. This means that some of the pharmacogenomic variants  found on this test may be shared by other relatives. These results could be helpful to share with other relatives, but it is important to remember that there are many factors that can contribute to how an individual responds to medications. Relatives should consider their own pharmacogenomics testing to better determine their recommendations and they should consult with their health care providers before making any changes.     What pharmacogenomic testing cannot tell us:  This pharmacogenomic testing is not looking at every known pharmacogenomic polymorphism and therefore the results cannot tell us about every possible gene-drug interaction. It is also not looking at genes outside of the scope of pharmacogenomics, and therefore, the results will not tell us if Brennan is at risk for other genetic conditions. If Brennan has questions about a possible underlying genetic condition, he should talk with his primary care provider about seeking an appointment in our Genetics Clinic.     Testing logistics:  [facility] will try to obtain insurance coverage for the pharmacogenomic testing, however this is not always a covered service. A cost waiver form (Medicare replacement non-coverage form) is required, but cost to the patient is not expected to exceed $350.     A blood or buccal sample will be collected for DNA analysis. The results of this test take 1-2 weeks. An appointment will be made with the pharmacist to discuss these results in detail and to discuss the medication recommendations based on these results. These test results will be accessible in [employer] and may be viewable prior to the appointment with the pharmacist, but NO CHANGES SHOULD BE MADE TO MEDICATIONS BEFORE TALKING TO THE PHARMACIST OR HEALTHCARE PROVIDER.     Brennan consented to pharmacogenomic testing today. The insurance and billing were explained and Brennan agreed to the billing plan. Due to the fact that this was a virtual visit, Brennan gave  me verbal permission/consent to sign the genetic testing consent form and the cost waiver form (Medicare replacement non-coverage form) on their behalf.     It was a pleasure meeting Brennan today. He was encouraged to reach out to me if he has any further questions.     Plan:  Brennan will arrange a lab appointment at a [facility] laboratory to have a blood sample drawn for the [facility] Pharmacogenomic Test  Brennan was given the phone number to call to make the results appointment with the [facility] pharmacist ([phone]). he was instructed to call to schedule once he has his blood drawn. If he does not call to schedule someone will reach out to schedule when the Pharmacogenomics Test is completed.     Parul Odonnell MS, [facility]  Genetic Counseling  [facility]  Email: [email or web address]  Phone: [phone]  Fax: [phone]      Time spent in consultation face to face was approximately 20 minutes.            Please do not hesitate to contact me if you have any questions/concerns.     Sincerely,       Parul Odonnell, GC

## 2023-08-28 NOTE — NURSING NOTE
Is the patient currently in the state of [place]? YES    Visit mode:VIDEO    If the visit is dropped, the patient can be reconnected by: VIDEO VISIT: Text to cell phone:   Telephone Information:   Mobile [phone]       Will anyone else be joining the visit? NO  (If patient encounters technical issues they should call [phone]    How would you like to obtain your AVS? MyChart    Are changes needed to the allergy or medication list? No    Reason for visit: Consult    Jasmyne JOSEPH

## 2023-08-28 NOTE — PATIENT INSTRUCTIONS
Plan:  Brennan will arrange a lab appointment at a [facility] laboratory to have a blood sample drawn for the [facility] Pharmacogenomic Test  Brennan was given the phone number to call to make the results appointment with the [facility] pharmacist ([phone]). he was instructed to call to schedule once he has his blood drawn. If he does not call to schedule someone will reach out to schedule when the Pharmacogenomics Test is completed.     Parul Odonnell MS, [facility]  Genetic Counseling  [facility]  Email: [email or web address]  Phone: [phone]  Fax: [phone]

## 2023-09-01 ENCOUNTER — LAB (OUTPATIENT)
Dept: LAB | Facility: CLINIC | Age: 58
End: 2023-09-01
Payer: COMMERCIAL

## 2023-09-01 DIAGNOSIS — Z71.83 ENCOUNTER FOR NONPROCREATIVE GENETIC COUNSELING AND TESTING: ICD-10-CM

## 2023-09-01 DIAGNOSIS — F33.0 MAJOR DEPRESSIVE DISORDER, RECURRENT EPISODE, MILD (H): ICD-10-CM

## 2023-09-01 DIAGNOSIS — Z13.71 ENCOUNTER FOR NONPROCREATIVE GENETIC COUNSELING AND TESTING: ICD-10-CM

## 2023-09-01 DIAGNOSIS — Z78.9 LACK OF DRUG EFFECT (PROPERLY PRESCRIBED AND ADMINISTERED): ICD-10-CM

## 2023-09-01 DIAGNOSIS — F41.9 ANXIETY: ICD-10-CM

## 2023-09-01 LAB — LAB DIRECTOR RESULTS: NORMAL

## 2023-09-01 PROCEDURE — 36415 COLL VENOUS BLD VENIPUNCTURE: CPT

## 2023-09-01 PROCEDURE — 81418 RX METAB GEN SEQ ALYS PNL 6: CPT | Performed by: PSYCHIATRY & NEUROLOGY

## 2023-09-01 PROCEDURE — G0452 MOLECULAR PATHOLOGY INTERPR: HCPCS | Mod: 26 | Performed by: STUDENT IN AN ORGANIZED HEALTH CARE EDUCATION/TRAINING PROGRAM

## 2023-09-08 ENCOUNTER — VIRTUAL VISIT (OUTPATIENT)
Dept: PSYCHIATRY | Facility: CLINIC | Age: 58
End: 2023-09-08
Payer: COMMERCIAL

## 2023-09-08 DIAGNOSIS — F32.0 CURRENT MILD EPISODE OF MAJOR DEPRESSIVE DISORDER WITHOUT PRIOR EPISODE (H): Primary | ICD-10-CM

## 2023-09-08 DIAGNOSIS — G47.00 INSOMNIA, UNSPECIFIED TYPE: ICD-10-CM

## 2023-09-08 DIAGNOSIS — F33.0 MAJOR DEPRESSIVE DISORDER, RECURRENT EPISODE, MILD (H): ICD-10-CM

## 2023-09-08 PROCEDURE — 99214 OFFICE O/P EST MOD 30 MIN: CPT | Mod: VID | Performed by: PSYCHIATRY & NEUROLOGY

## 2023-09-08 RX ORDER — VILAZODONE HYDROCHLORIDE 10 MG/1
10 TABLET ORAL DAILY
Qty: 30 TABLET | Refills: 1 | Status: SHIPPED | OUTPATIENT
Start: 2023-09-08 | End: 2023-10-25

## 2023-09-08 ASSESSMENT — PAIN SCALES - GENERAL: PAINLEVEL: NO PAIN (0)

## 2023-09-08 NOTE — NURSING NOTE
Is the patient currently in the state of [place]? YES    Visit mode:VIDEO    If the visit is dropped, the patient can be reconnected by: VIDEO VISIT: Text to cell phone:   Telephone Information:   Mobile [phone]       Will anyone else be joining the visit? NO  (If patient encounters technical issues they should call [phone]    How would you like to obtain your AVS? MyChart    Are changes needed to the allergy or medication list? No    Reason for visit: RECHECK    Candace JOSEPH

## 2023-09-08 NOTE — PROGRESS NOTES
Virtual Visit Details    Type of service:  Video Visit     Originating Location (pt. Location): Home    Distant Location (provider location):  Off-site  Platform used for Video Visit: [facility] Psychiatry Consult Note    IDENTIFICATION   Name: Brennan Vazquez   : 1965/58 year old      Sex:    @ male          Telemedicine Visit: The patient's condition can be safely assessed and treated via synchronous audio and visual telemedicine encounter.        Face to Face/patient Contact total time: 21 minutes  Pre Charting time: 1 minutes; Post charting time, communication and other activities: 1 minutes; Total time 23 minutes  2:02 PM - 2:23 PM          SUBJECTIVE   Mental health seems slightly better. Started feeling normal after 4 days on vilazodone and slept good however it did not full sustained. Has not had any alprazolam since starting vilazodone. Feeling more comfortable. Less dreading things.     Still a bit of anxiety, and remains tired in general. Not sleeping well. Has not been taking trazodone - due to stuffy nose side effect and lacks efficacy. Took 3-4 nights of trazodone; made him feel like he was sick. Lunesta- waking up 2-3 hours after taking.         PHQ-9 scores:      2022     9:34 AM 1/10/2023     6:08 PM 2023     4:45 PM   PHQ-9 SCORE   PHQ-9 Total Score MyChart 7 (Mild depression) 3 (Minimal depression) 8 (Mild depression)   PHQ-9 Total Score 7 3 8       EBONY-7 scores:      2022     9:35 AM 1/10/2023     6:08 PM 2023     8:32 PM   EBONY-7 SCORE   Total Score 7 (mild anxiety) 3 (minimal anxiety) 8 (mild anxiety)   Total Score 7 3 8       OBJECTIVE     Vital Signs:   There were no vitals taken for this visit.    Labs:  na    Current Medications:  Current Outpatient Medications   Medication    alendronate (FOSAMAX) 70 MG tablet    ALPRAZolam (XANAX) 0.25 MG tablet    aspirin 81 MG EC tablet    atenolol (TENORMIN) 25 MG tablet    calcium carbonate-vitamin D3 (CALTRATE  600 PLUS D3) 600 mg(1,500mg) -400 unit per tablet    eszopiclone (LUNESTA) 2 MG tablet    multivitamin therapeutic tablet    sildenafil (REVATIO) 20 MG tablet    simvastatin (ZOCOR) 10 MG tablet    vilazodone (VIIBRYD) 10 MG TABS tablet    vitamin B-12 (CYANOCOBALAMIN) 100 MCG tablet    buPROPion (WELLBUTRIN SR) 150 MG 12 hr tablet    citalopram (CELEXA) 10 MG tablet    traZODone (DESYREL) 100 MG tablet     No current facility-administered medications for this visit.        The [place] Prescription Monitoring Program has been reviewed and there are no concerns about diversionary activity for controlled substances at this time.        ADDED HISTORY   Had sleep study - noted snoring, RLS.       MENTAL STATUS EXAMINATION:   Appearance: Good attention to grooming and hygiene  Attitude: Cooperative  Eye Contact: Good  Gait and Station: Sitting  Psychomotor Behavior: Within normal limits  Oriented to: Grossly person place and time  Attention Span and Concentration: Grossly intact  Speech: Depressed tone  Language: English  Mood:  sad   Affect: Mildly constricted  Associations:  no loose associations  Thought Process:  logical, linear and goal oriented  Thought Content: No evidence of delusions or suicidal or homicidal ideation plan or intent  Memory: Grossly intact  Fund of Knowledge: Good  Insight: Good   Judgment:  intact, adequate for safety  Impulse Control:  intact        DIAGNOSES:   Major depressive disorder, mild, single episode, in partial remission  Unspecified anxiety disorder  Insomnia disorder      ASSESSMENT:   Undergoing depression symptoms in context of previous challenges as a  and snf.  May have had some degree of proneness to anxiety slightly.  Has not tolerated medications thus far, pursue Viibryd and Pharmacogenomic testing.  Switch Lunesta to trazodone.    Today Brennan Vazquez reports no suicidal ideation. In addition, he has notable risk factors for self-harm, including anxiety.  However, risk is mitigated by commitment to family, absence of past attempts, and history of seeking help when needed. Therefore, based on all available evidence including the factors cited above, he does not appear to be at imminent risk for self-harm, does not meet criteria for a 72-hr hold, and therefore remains appropriate for ongoing outpatient level of care.       PLAN:     Patient advised of consultative model. Patient will continue to be seen for ongoing consultation and stabilization.  Does not meet criteria for involuntary treatment or hospitalization  Trial of Viibryd 7/31/23 10 mg daily mild to moderate efficacy, reduced libido/sexual dysfunction-Risks, benefits and alternatives discussed.  Patient provides verbal consent to treatment. Will try am to see if sleep improves - follow-up   Continue Lunesta 2 mg nightly-Risks, benefits and alternatives discussed.  Patient provides verbal consent to treatment.  Advised of recommendation against long-term use, memory  Alprazolam 0.25 mg p.o. daily as needed anxiety  Backup - duloxetine 30 mg po qday  Discontinue trazodone (nasal conguestion)  Labs -reviewed  Referred for cognitive behavioral therapy for insomnia  Return in 4-6 weeks    Administrative Billing:   Time spent with patient was greater than 50% of time and/or significant time was spent in counseling and coordination of care regarding above diagnoses and treatment plan. Pre charting time and post charting time/documentation/coordination are done on date of service.      Signed:   Austin Jackson M.D.  [facility] Psychiatry [facility]    Disclaimer: This note consists of symbols derived from keyboarding, dictation and/or voice recognition software. As a result, there may be errors in the script that have gone undetected. Please consider this when interpreting information found in this chart.

## 2023-09-26 ENCOUNTER — VIRTUAL VISIT (OUTPATIENT)
Dept: PHARMACY | Facility: CLINIC | Age: 58
End: 2023-09-26
Attending: PSYCHIATRY & NEUROLOGY
Payer: COMMERCIAL

## 2023-09-26 DIAGNOSIS — Z78.9: ICD-10-CM

## 2023-09-26 DIAGNOSIS — F41.1 GAD (GENERALIZED ANXIETY DISORDER): ICD-10-CM

## 2023-09-26 DIAGNOSIS — Z13.79 ENCOUNTER FOR PHARMACOGENETIC TESTING: Primary | ICD-10-CM

## 2023-09-26 PROCEDURE — 99207 PR NO CHARGE LOS: CPT | Performed by: PHARMACIST

## 2023-09-26 NOTE — PROGRESS NOTES
"Disease State Management Encounter:                          Elvis Vazquez is a 58 year old male called for an initial visit. He was referred to me from psychiatry.     Reason for visit: Pgx review.     Medication Adherence/Access: no issues reported    Anxiety:   -vilazodone 10mg daily (started on 8/1/23)  -alprazolam 0.25mg daily prn    Patient reported feeling that vilazodone has been helpful. He does have some sexual side effects, though better than with some other medications in the past, especially citalopram. Overall going ok and he does not have other concerns.     Assessment/Plan:    Medication is going ok and not affected by PGx results. Continue current medication.      Pharmacogenetic (PGx) Results: Pharmacogenetic testing was ordered for Brennan Vazquez to assist in the treatment of mental health. See \"Pharmacogenomics Profile\" in lab result tab for complete list of pharmacogenetic results.     Results relevant for PGx consult reason:    [id number] normal metabolizer: normal [id number] function  CYP2D6 normal metabolizer: normal CYP2D6 function      Follow-up: psychiatry 10/25/23; MTM as needed    I spent 10 minutes with this patient today. A copy of the visit note was provided to the patient's provider(s).    A summary of these recommendations was sent via clinic portal.    Althea Humphreys, Landy  Medication Therapy Management Pharmacist  [facility] Neu[facility]     Medication Therapy Recommendations  No medication therapy recommendations to display   "

## 2023-09-26 NOTE — Clinical Note
Hello- met to review PGx. All normal :) He seems to be doing ok and will see you in a month. -Althea

## 2023-09-26 NOTE — PATIENT INSTRUCTIONS
Recommendations from today's disease management visit:                                                      Continue current medication.      Follow-up: as needed    To schedule another MTM appointment, please call the clinic directly or you may call the MTM scheduling line at [phone] or toll-free at [phone].     My Clinical Pharmacist's contact information:                                                      Please feel free to contact me with any questions or concerns you have.      Althea Humphreys, Landy  Medication Therapy Management Pharmacist  [facility]iat[facility] Neurology Clin[facility]

## 2023-09-27 DIAGNOSIS — M94.9 DISORDER OF BONE AND CARTILAGE: ICD-10-CM

## 2023-09-27 DIAGNOSIS — M89.9 DISORDER OF BONE AND CARTILAGE: ICD-10-CM

## 2023-09-28 RX ORDER — ALENDRONATE SODIUM 70 MG/1
TABLET ORAL
Qty: 12 TABLET | Refills: 1 | OUTPATIENT
Start: 2023-09-28

## 2023-10-01 ENCOUNTER — MYC MEDICAL ADVICE (OUTPATIENT)
Dept: FAMILY MEDICINE | Facility: CLINIC | Age: 58
End: 2023-10-01
Payer: COMMERCIAL

## 2023-10-02 ENCOUNTER — HOSPITAL ENCOUNTER (OUTPATIENT)
Dept: ULTRASOUND IMAGING | Facility: CLINIC | Age: 58
Discharge: HOME | End: 2023-10-02
Attending: STUDENT IN AN ORGANIZED HEALTH CARE EDUCATION/TRAINING PROGRAM | Admitting: STUDENT IN AN ORGANIZED HEALTH CARE EDUCATION/TRAINING PROGRAM
Payer: COMMERCIAL

## 2023-10-02 ENCOUNTER — OFFICE VISIT (OUTPATIENT)
Dept: FAMILY MEDICINE | Facility: CLINIC | Age: 58
End: 2023-10-02
Payer: COMMERCIAL

## 2023-10-02 VITALS
HEART RATE: 67 BPM | BODY MASS INDEX: 23.78 KG/M2 | RESPIRATION RATE: 16 BRPM | DIASTOLIC BLOOD PRESSURE: 72 MMHG | HEIGHT: 72 IN | TEMPERATURE: 97.7 F | WEIGHT: 175.6 LBS | SYSTOLIC BLOOD PRESSURE: 110 MMHG | OXYGEN SATURATION: 96 %

## 2023-10-02 DIAGNOSIS — I82.811 SUPERFICIAL THROMBOSIS OF LEG, RIGHT: ICD-10-CM

## 2023-10-02 DIAGNOSIS — R25.2 CRAMPS OF RIGHT LOWER EXTREMITY: Primary | ICD-10-CM

## 2023-10-02 DIAGNOSIS — R25.2 CRAMPS OF RIGHT LOWER EXTREMITY: ICD-10-CM

## 2023-10-02 LAB
ALBUMIN SERPL BCG-MCNC: 4.1 G/DL (ref 3.5–5.2)
ALP SERPL-CCNC: 63 U/L (ref 40–129)
ALT SERPL W P-5'-P-CCNC: 20 U/L (ref 0–70)
ANION GAP SERPL CALCULATED.3IONS-SCNC: 10 MMOL/L (ref 7–15)
AST SERPL W P-5'-P-CCNC: 20 U/L (ref 0–45)
BILIRUB SERPL-MCNC: 0.5 MG/DL
BUN SERPL-MCNC: 17.4 MG/DL (ref 6–20)
CALCIUM SERPL-MCNC: 9.2 MG/DL (ref 8.6–10)
CHLORIDE SERPL-SCNC: 104 MMOL/L (ref 98–107)
CREAT SERPL-MCNC: 0.96 MG/DL (ref 0.67–1.17)
DEPRECATED HCO3 PLAS-SCNC: 26 MMOL/L (ref 22–29)
EGFRCR SERPLBLD CKD-EPI 2021: >90 ML/MIN/1.73M2
ERYTHROCYTE [DISTWIDTH] IN BLOOD BY AUTOMATED COUNT: 12.8 % (ref 10–15)
GLUCOSE SERPL-MCNC: 99 MG/DL (ref 70–99)
HCT VFR BLD AUTO: 42.6 % (ref 40–53)
HGB BLD-MCNC: 13.9 G/DL (ref 13.3–17.7)
MAGNESIUM SERPL-MCNC: 2 MG/DL (ref 1.7–2.3)
MCH RBC QN AUTO: 29.6 PG (ref 26.5–33)
MCHC RBC AUTO-ENTMCNC: 32.6 G/DL (ref 31.5–36.5)
MCV RBC AUTO: 91 FL (ref 78–100)
PHOSPHATE SERPL-MCNC: 2.9 MG/DL (ref 2.5–4.5)
PLATELET # BLD AUTO: 183 10E3/UL (ref 150–450)
POTASSIUM SERPL-SCNC: 4.1 MMOL/L (ref 3.4–5.3)
PROT SERPL-MCNC: 6.5 G/DL (ref 6.4–8.3)
RBC # BLD AUTO: 4.7 10E6/UL (ref 4.4–5.9)
SODIUM SERPL-SCNC: 140 MMOL/L (ref 135–145)
WBC # BLD AUTO: 6.8 10E3/UL (ref 4–11)

## 2023-10-02 PROCEDURE — 90471 IMMUNIZATION ADMIN: CPT | Performed by: STUDENT IN AN ORGANIZED HEALTH CARE EDUCATION/TRAINING PROGRAM

## 2023-10-02 PROCEDURE — 90682 RIV4 VACC RECOMBINANT DNA IM: CPT | Performed by: STUDENT IN AN ORGANIZED HEALTH CARE EDUCATION/TRAINING PROGRAM

## 2023-10-02 PROCEDURE — 93971 EXTREMITY STUDY: CPT | Mod: RT

## 2023-10-02 PROCEDURE — 84100 ASSAY OF PHOSPHORUS: CPT | Performed by: STUDENT IN AN ORGANIZED HEALTH CARE EDUCATION/TRAINING PROGRAM

## 2023-10-02 PROCEDURE — 80053 COMPREHEN METABOLIC PANEL: CPT | Performed by: STUDENT IN AN ORGANIZED HEALTH CARE EDUCATION/TRAINING PROGRAM

## 2023-10-02 PROCEDURE — 99214 OFFICE O/P EST MOD 30 MIN: CPT | Mod: 25 | Performed by: STUDENT IN AN ORGANIZED HEALTH CARE EDUCATION/TRAINING PROGRAM

## 2023-10-02 PROCEDURE — 83735 ASSAY OF MAGNESIUM: CPT | Performed by: STUDENT IN AN ORGANIZED HEALTH CARE EDUCATION/TRAINING PROGRAM

## 2023-10-02 PROCEDURE — 36415 COLL VENOUS BLD VENIPUNCTURE: CPT | Performed by: STUDENT IN AN ORGANIZED HEALTH CARE EDUCATION/TRAINING PROGRAM

## 2023-10-02 PROCEDURE — 85027 COMPLETE CBC AUTOMATED: CPT | Performed by: STUDENT IN AN ORGANIZED HEALTH CARE EDUCATION/TRAINING PROGRAM

## 2023-10-02 RX ORDER — BISACODYL 5 MG/1
TABLET, DELAYED RELEASE ORAL
Qty: 4 TABLET | Refills: 0 | Status: SHIPPED | OUTPATIENT
Start: 2023-10-02 | End: 2023-10-09

## 2023-10-02 RX ORDER — NAPROXEN 500 MG/1
500 TABLET ORAL 2 TIMES DAILY PRN
Qty: 30 TABLET | Refills: 1 | Status: SHIPPED | OUTPATIENT
Start: 2023-10-02 | End: 2023-12-15

## 2023-10-02 ASSESSMENT — ENCOUNTER SYMPTOMS: LEG PAIN: 1

## 2023-10-02 ASSESSMENT — PAIN SCALES - GENERAL: PAINLEVEL: MILD PAIN (2)

## 2023-10-02 ASSESSMENT — PATIENT HEALTH QUESTIONNAIRE - PHQ9
SUM OF ALL RESPONSES TO PHQ QUESTIONS 1-9: 3
10. IF YOU CHECKED OFF ANY PROBLEMS, HOW DIFFICULT HAVE THESE PROBLEMS MADE IT FOR YOU TO DO YOUR WORK, TAKE CARE OF THINGS AT HOME, OR GET ALONG WITH OTHER PEOPLE: SOMEWHAT DIFFICULT
SUM OF ALL RESPONSES TO PHQ QUESTIONS 1-9: 3

## 2023-10-02 NOTE — PROGRESS NOTES
1. Cramps of right lower extremity  > suspect due to MSK related etiology rather than DVT or infection as patient is not having shortness of breath, chest pain, asymmetrical calf diameters, fevers, chills nausea, or vomiting    - US Lower Extremity Venous Duplex Right; Future  - Magnesium  - Phosphorus  - Comprehensive metabolic panel (BMP + Alb, Alk Phos, ALT, AST, Total. Bili, TP)  - CBC with platelets  - encouraged patient to perform home calf stretching exercises   - shared decision making was had with patient due to lower clinical suspicion for DVT patient is aware he can go home after imaging and states he would be able to return to the medical center if needed based on ultrasound findings (confirmed phone number with patient for callback)     Follow-up Plan:   - patient aware to return to clinic if symptoms do not improve for additional history and work up     ADDENDUM:   - received results of ultrasound, called patient and informed him of results, he is aware to make a follow-up appointment with me in 1 week (will also message staff for appointment request), take NSAIDs, wear compression stockings, and remain active/avoid being sedentary     Subjective   Elvis is a 58 year old, presenting for the following health issues:  Leg Pain (Right leg pain)        10/2/2023     3:27 PM   Additional Questions   Roomed by Jasmyne LEBRON LPN   Accompanied by self         10/2/2023     3:27 PM   Patient Reported Additional Medications   Patient reports taking the following new medications no new meds       History of Present Illness       Reason for visit:  Right leg pain  Symptom onset:  1-2 weeks ago    He eats 0-1 servings of fruits and vegetables daily.He consumes 1 sweetened beverage(s) daily.He exercises with enough effort to increase his heart rate 30 to 60 minutes per day.  He exercises with enough effort to increase his heart rate 5 days per week.   He is taking medications regularly.       Musculoskeletal  "Pain  Onset: 1-2 weeks ago  Description:   Location: inner thigh (started on 9/22) to mid calf on the inside of right leg (over this past weekend)   Character: Stabbing and Burning with palpation, otherwise when left alone \"feels like a dull ache\"   Intensity: moderate currently pain level is 2/10, at it's worst while walking 5/10. 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  Progression of Symptoms: worse  Accompanying Signs & Symptoms:  Other symptoms: radiation of pain to calf, tingling, and swelling  History:   Previous similar pain: no     Precipitating factors:   Trauma or overuse: no   Alleviating factors:  Improved by: rest/inactivity    Therapies Tried and outcome: just rest at bedtime helps but not much     No dyspnea, chest pain, difficulty breathing   Denies fevers, chills nausea, vomiting   Denies any trauma, injury, strain to affected leg       Review of Systems   As above       Objective    /72 (BP Location: Right arm, Patient Position: Sitting, Cuff Size: Adult Regular)   Pulse 67   Temp 97.7  F (36.5  C) (Tympanic)   Resp 16   Ht 1.829 m (6')   Wt 79.7 kg (175 lb 9.6 oz)   SpO2 96%   BMI 23.82 kg/m    Body mass index is 23.82 kg/m .  Physical Exam  Constitutional:       General: He is not in acute distress.  HENT:      Head: Normocephalic and atraumatic.      Right Ear: External ear normal.      Left Ear: External ear normal.      Mouth/Throat:      Mouth: Mucous membranes are moist.      Pharynx: Oropharynx is clear. No oropharyngeal exudate or posterior oropharyngeal erythema.   Eyes:      Extraocular Movements: Extraocular movements intact.   Cardiovascular:      Rate and Rhythm: Normal rate and regular rhythm.      Heart sounds: Normal heart sounds.   Pulmonary:      Effort: Pulmonary effort is normal. No respiratory distress.      Breath sounds: Normal breath sounds. No wheezing or rhonchi.   Abdominal:      Palpations: Abdomen is soft. There is no mass. "      Tenderness: There is no abdominal tenderness.   Musculoskeletal:         General: No deformity. Normal range of motion.      Cervical back: Normal range of motion and neck supple.      Comments: Both calves measured 14 inches in diameter    Skin:     General: Skin is warm.      Findings: No rash.   Neurological:      General: No focal deficit present.      Mental Status: He is alert and oriented to person, place, and time.   Psychiatric:         Mood and Affect: Mood normal.

## 2023-10-02 NOTE — NURSING NOTE
Immunizations Administered       Name Date Dose VIS Date Route    Influenza Vaccine 50-64 or 18-64 w/egg allergy (Flublok) 10/2/23  3:38 PM 0.5 mL 08/06/2021, Given Today Intramuscular          Prior to immunization administration, verified patients identity using patient s name and date of birth. Please see Immunization Activity for additional information.     Screening Questionnaire for Adult Immunization    Are you sick today?   No   Do you have allergies to medications, food, a vaccine component or latex?   No   Have you ever had a serious reaction after receiving a vaccination?   No   Do you have a long-term health problem with heart, lung, kidney, or metabolic disease (e.g., diabetes), asthma, a blood disorder, no spleen, complement component deficiency, a cochlear implant, or a spinal fluid leak?  Are you on long-term aspirin therapy?   No   Do you have cancer, leukemia, HIV/AIDS, or any other immune system problem?   No   Do you have a parent, brother, or sister with an immune system problem?   No   In the past 3 months, have you taken medications that affect  your immune system, such as prednisone, other steroids, or anticancer drugs; drugs for the treatment of rheumatoid arthritis, Crohn s disease, or psoriasis; or have you had radiation treatments?   No   Have you had a seizure, or a brain or other nervous system problem?   No   During the past year, have you received a transfusion of blood or blood    products, or been given immune (gamma) globulin or antiviral drug?   No   For women: Are you pregnant or is there a chance you could become       pregnant during the next month?   No   Have you received any vaccinations in the past 4 weeks?   No     Immunization questionnaire answers were all negative.      Patient instructed to remain in clinic for 15 minutes afterwards, and to report any adverse reactions.     Screening performed by Jasmyne LEBRON LPN on 10/2/2023 at 3:40 PM.

## 2023-10-03 ENCOUNTER — TELEPHONE (OUTPATIENT)
Dept: FAMILY MEDICINE | Facility: CLINIC | Age: 58
End: 2023-10-03
Payer: COMMERCIAL

## 2023-10-03 NOTE — RESULT ENCOUNTER NOTE
Devika Vazquez,     Your ultrasound showed you had a superficial clot in a varicose vein. At this time we can hold off on anticoagulation/blood thinner medications. Please keep moving, use naproxen (with food), and wear compression socks. Please make a follow-up appointment with me in 1 week to reassess.     Sincerely,     Margie Walker MD

## 2023-10-06 ENCOUNTER — ANESTHESIA EVENT (OUTPATIENT)
Dept: GASTROENTEROLOGY | Facility: CLINIC | Age: 58
End: 2023-10-06
Payer: COMMERCIAL

## 2023-10-06 ASSESSMENT — LIFESTYLE VARIABLES: TOBACCO_USE: 1

## 2023-10-06 NOTE — ANESTHESIA PREPROCEDURE EVALUATION
Anesthesia Pre-Procedure Evaluation    Patient: Brennan Vazquez   MRN: [id number] : 1965        Procedure : Procedure(s):  Colonoscopy          Past Medical History:   Diagnosis Date     Anxiety      History of angina     2021, work up no treatment needed     HLD (hyperlipidemia)      HTN (hypertension)      Osteoarthritis of left hip 2017     Osteopenia       Past Surgical History:   Procedure Laterality Date     LAPAROSCOPIC HERNIORRHAPHY INGUINAL Left 2022    Procedure: HERNIORRHAPHY, INGUINAL, LAPAROSCOPIC;  Surgeon: Bruce Roldan MD;  Location: [place] [facility]     ORTHOPEDIC SURGERY  2017, 07/15/2021    L Hip Rplacement, R Hip replacement     SEPTOPLASTY, TURBINOPLASTY, COMBINED N/A 3/29/2022    Procedure: SEPTOPLASTY, NOSE, WITH TURBINOPLASTY Ballon assisted with cryoabation of posterior nasal tissue;  Surgeon: Flavio Odonnell MD;  Location: [facility]     TOTAL HIP ARTHROPLASTY Left 2017    [facility]       No Known Allergies   Social History     Tobacco Use     Smoking status: Never     Smokeless tobacco: Former     Quit date: 1987   Substance Use Topics     Alcohol use: Yes     Comment: 3-7 beers a week      Wt Readings from Last 1 Encounters:   10/02/23 79.7 kg (175 lb 9.6 oz)        Anesthesia Evaluation            ROS/MED HX  ENT/Pulmonary:     (+)           allergic rhinitis,     tobacco use, Past use,                      Neurologic:       Cardiovascular:     (+) Dyslipidemia hypertension- -   -  - -                                 Previous cardiac testing   Echo: Date: Results:    Stress Test:  Date: 3/21 Results:  Result Text     The nuclear stress test is negative for inducible myocardial ischemia or infarction.     The left ventricular ejection fraction at stress is 65%.     The patient's exercise capacity is above average for age.     There is no prior study for comparison.    ECG Reviewed:  Date:  Results:  Sinus bradycardia   ST elevation,  consider early repolarization   Borderline ECG     Cath:  Date: Results:      METS/Exercise Tolerance:     Hematologic:       Musculoskeletal:  - neg musculoskeletal ROS (+)  arthritis,             GI/Hepatic:  - neg GI/hepatic ROS     Renal/Genitourinary:  - neg Renal ROS     Endo:  - neg endo ROS     Psychiatric/Substance Use:     (+) psychiatric history anxiety       Infectious Disease:  - neg infectious disease ROS     Malignancy:  - neg malignancy ROS     Other:            Physical Exam    Airway        Mallampati: II   TM distance: > 3 FB      Respiratory Devices and Support         Dental    unable to assess        Cardiovascular   cardiovascular exam normal          Pulmonary   pulmonary exam normal            OUTSIDE LABS:  CBC:   Lab Results   Component Value Date    WBC 6.8 10/02/2023    WBC 4.3 09/03/2021    HGB 13.9 10/02/2023    HGB 13.9 03/16/2022    HCT 42.6 10/02/2023    HCT 41.6 09/03/2021     10/02/2023     09/03/2021     BMP:   Lab Results   Component Value Date     10/02/2023     01/12/2023    POTASSIUM 4.1 10/02/2023    POTASSIUM 4.5 01/12/2023    CHLORIDE 104 10/02/2023    CHLORIDE 101 01/12/2023    CO2 26 10/02/2023    CO2 27 01/12/2023    BUN 17.4 10/02/2023    BUN 11.9 01/12/2023    CR 0.96 10/02/2023    CR 0.85 01/12/2023    GLC 99 10/02/2023     (H) 01/12/2023     COAGS: No results found for: PTT, INR, FIBR  POC: No results found for: BGM, HCG, HCGS  HEPATIC:   Lab Results   Component Value Date    ALBUMIN 4.1 10/02/2023    PROTTOTAL 6.5 10/02/2023    ALT 20 10/02/2023    AST 20 10/02/2023    ALKPHOS 63 10/02/2023    BILITOTAL 0.5 10/02/2023     OTHER:   Lab Results   Component Value Date    A1C 5.2 10/12/2017    ELEAZAR 9.2 10/02/2023    PHOS 2.9 10/02/2023    MAG 2.0 10/02/2023    LIPASE <9 09/03/2021    AMYLASE 53 09/03/2021    CRP 0.2 09/03/2021    SED 4 09/03/2021       Anesthesia Plan    ASA Status:  2       Anesthesia Type: General.   Induction:  Intravenous.           Consents    Anesthesia Plan(s) and associated risks, benefits, and realistic alternatives discussed. Questions answered and patient/representative(s) expressed understanding.     - Discussed: Risks, Benefits and Alternatives for the PROCEDURE were discussed     - Discussed with:  Patient            Postoperative Care    Pain management: IV analgesics, Oral pain medications.   PONV prophylaxis: Ondansetron (or other 5HT-3), Dexamethasone or Solumedrol     Comments:              MARVEL Bernal CRNA

## 2023-10-09 ENCOUNTER — ANESTHESIA (OUTPATIENT)
Dept: GASTROENTEROLOGY | Facility: CLINIC | Age: 58
End: 2023-10-09
Payer: COMMERCIAL

## 2023-10-09 ENCOUNTER — HOSPITAL ENCOUNTER (OUTPATIENT)
Facility: CLINIC | Age: 58
Discharge: HOME | End: 2023-10-09
Attending: SURGERY | Admitting: SURGERY
Payer: COMMERCIAL

## 2023-10-09 VITALS
TEMPERATURE: 97.9 F | DIASTOLIC BLOOD PRESSURE: 85 MMHG | RESPIRATION RATE: 16 BRPM | BODY MASS INDEX: 23.7 KG/M2 | SYSTOLIC BLOOD PRESSURE: 130 MMHG | OXYGEN SATURATION: 100 % | HEIGHT: 72 IN | HEART RATE: 61 BPM | WEIGHT: 175 LBS

## 2023-10-09 DIAGNOSIS — Z12.11 SPECIAL SCREENING FOR MALIGNANT NEOPLASMS, COLON: Primary | ICD-10-CM

## 2023-10-09 LAB — COLONOSCOPY: NORMAL

## 2023-10-09 PROCEDURE — 88305 TISSUE EXAM BY PATHOLOGIST: CPT | Mod: TC | Performed by: SURGERY

## 2023-10-09 PROCEDURE — 258N000003 HC RX IP 258 OP 636: Performed by: NURSE ANESTHETIST, CERTIFIED REGISTERED

## 2023-10-09 PROCEDURE — 45385 COLONOSCOPY W/LESION REMOVAL: CPT | Mod: PT | Performed by: SURGERY

## 2023-10-09 PROCEDURE — 88305 TISSUE EXAM BY PATHOLOGIST: CPT | Mod: 26 | Performed by: PATHOLOGY

## 2023-10-09 PROCEDURE — 370N000017 HC ANESTHESIA TECHNICAL FEE, PER MIN: Performed by: SURGERY

## 2023-10-09 PROCEDURE — 250N000009 HC RX 250: Performed by: SURGERY

## 2023-10-09 PROCEDURE — 250N000011 HC RX IP 250 OP 636: Performed by: NURSE ANESTHETIST, CERTIFIED REGISTERED

## 2023-10-09 RX ORDER — PROPOFOL 10 MG/ML
INJECTION, EMULSION INTRAVENOUS PRN
Status: DISCONTINUED | OUTPATIENT
Start: 2023-10-09 | End: 2023-10-09

## 2023-10-09 RX ORDER — NALOXONE HYDROCHLORIDE 0.4 MG/ML
0.2 INJECTION, SOLUTION INTRAMUSCULAR; INTRAVENOUS; SUBCUTANEOUS
Status: DISCONTINUED | OUTPATIENT
Start: 2023-10-09 | End: 2023-10-09 | Stop reason: HOSPADM

## 2023-10-09 RX ORDER — LIDOCAINE 40 MG/G
CREAM TOPICAL
Status: DISCONTINUED | OUTPATIENT
Start: 2023-10-09 | End: 2023-10-09 | Stop reason: HOSPADM

## 2023-10-09 RX ORDER — FLUMAZENIL 0.1 MG/ML
0.2 INJECTION, SOLUTION INTRAVENOUS
Status: DISCONTINUED | OUTPATIENT
Start: 2023-10-09 | End: 2023-10-09 | Stop reason: HOSPADM

## 2023-10-09 RX ORDER — SODIUM CHLORIDE, SODIUM LACTATE, POTASSIUM CHLORIDE, CALCIUM CHLORIDE 600; 310; 30; 20 MG/100ML; MG/100ML; MG/100ML; MG/100ML
INJECTION, SOLUTION INTRAVENOUS CONTINUOUS
Status: DISCONTINUED | OUTPATIENT
Start: 2023-10-09 | End: 2023-10-09 | Stop reason: HOSPADM

## 2023-10-09 RX ORDER — PROPOFOL 10 MG/ML
INJECTION, EMULSION INTRAVENOUS CONTINUOUS PRN
Status: DISCONTINUED | OUTPATIENT
Start: 2023-10-09 | End: 2023-10-09

## 2023-10-09 RX ORDER — NALOXONE HYDROCHLORIDE 0.4 MG/ML
0.4 INJECTION, SOLUTION INTRAMUSCULAR; INTRAVENOUS; SUBCUTANEOUS
Status: DISCONTINUED | OUTPATIENT
Start: 2023-10-09 | End: 2023-10-09 | Stop reason: HOSPADM

## 2023-10-09 RX ORDER — ONDANSETRON 2 MG/ML
4 INJECTION INTRAMUSCULAR; INTRAVENOUS
Status: DISCONTINUED | OUTPATIENT
Start: 2023-10-09 | End: 2023-10-09 | Stop reason: HOSPADM

## 2023-10-09 RX ADMIN — LIDOCAINE HYDROCHLORIDE 0.2 ML: 10 INJECTION, SOLUTION INFILTRATION; PERINEURAL at 11:46

## 2023-10-09 RX ADMIN — PROPOFOL 200 MCG/KG/MIN: 10 INJECTION, EMULSION INTRAVENOUS at 12:31

## 2023-10-09 RX ADMIN — SODIUM CHLORIDE, POTASSIUM CHLORIDE, SODIUM LACTATE AND CALCIUM CHLORIDE: 600; 310; 30; 20 INJECTION, SOLUTION INTRAVENOUS at 11:44

## 2023-10-09 RX ADMIN — PROPOFOL 100 MG: 10 INJECTION, EMULSION INTRAVENOUS at 12:36

## 2023-10-09 ASSESSMENT — ACTIVITIES OF DAILY LIVING (ADL): ADLS_ACUITY_SCORE: 35

## 2023-10-09 NOTE — ANESTHESIA POSTPROCEDURE EVALUATION
Patient: Brennan Vazquez    Procedure: Procedure(s):  COLONOSCOPY, FLEXIBLE, WITH LESION REMOVAL USING SNARE       Anesthesia Type:  General    Note:  Disposition: Outpatient   Postop Pain Control: Uneventful            Sign Out: Well controlled pain   PONV: No   Neuro/Psych: Uneventful            Sign Out: Acceptable/Baseline neuro status   Airway/Respiratory: Uneventful            Sign Out: Acceptable/Baseline resp. status   CV/Hemodynamics: Uneventful            Sign Out: Acceptable CV status; No obvious hypovolemia; No obvious fluid overload   Other NRE: NONE   DID A NON-ROUTINE EVENT OCCUR? No       Last vitals:  Vitals Value Taken Time   /68 10/09/23 1300   Temp     Pulse 65 10/09/23 1300   Resp 16 10/09/23 1300   SpO2 97 % 10/09/23 1302   Vitals shown include unvalidated device data.    Electronically Signed By: MARVEL Marrufo CRNA  October 9, 2023  1:03 PM

## 2023-10-09 NOTE — H&P
58 year old year old male here for colonoscopy for personal history of polyps.  Last colonoscopy was 2017.  Patient denies any changes in stool caliber or blood in stool. Patient denies family history of colon cancer.    Patient Active Problem List   Diagnosis    Osteopenia    Rosacea    Anxiety    Restless Legs Syndrome    Hypertension    Multiple lipomas    Seasonal allergies    Insomnia    Colon polyp 2017    Age-related osteoporosis without current pathological fracture    Hyperlipidemia LDL goal <100    Primary osteoarthritis of left hip    Nasal septal deviation    Nasal turbinate hypertrophy    Chronic rhinitis    Left inguinal hernia    [id number] normal metabolizer *1/*1    CYP2D6 normal metabolizer *1/*2       Past Medical History:   Diagnosis Date    Anxiety     History of angina     March 2021, work up no treatment needed    HLD (hyperlipidemia)     HTN (hypertension)     Osteoarthritis of left hip 6/5/2017    Osteopenia        Past Surgical History:   Procedure Laterality Date    LAPAROSCOPIC HERNIORRHAPHY INGUINAL Left 4/22/2022    Procedure: HERNIORRHAPHY, INGUINAL, LAPAROSCOPIC;  Surgeon: Bruce Roldan MD;  Location: [facility]    ORTHOPEDIC SURGERY  08/07/2017, 07/15/2021    L Hip Rplacement, R Hip replacement    SEPTOPLASTY, TURBINOPLASTY, COMBINED N/A 3/29/2022    Procedure: SEPTOPLASTY, NOSE, WITH TURBINOPLASTY Ballon assisted with cryoabation of posterior nasal tissue;  Surgeon: Flavio Odonnell MD;  Location: [place] [facility]    TOTAL HIP ARTHROPLASTY Left 08/2017    [facility]        Family History   Problem Relation Age of Onset    Hypertension Mother     Hyperlipidemia Mother     Hypertension Maternal Aunt     Hypertension Maternal Uncle     Hypertension Maternal Grandmother     Breast Cancer Maternal Grandmother     Hypertension Maternal Grandfather        Current Outpatient Rx   Medication Sig Dispense Refill    bisacodyl (DULCOLAX) 5 MG EC tablet Take 2 tablets at 3 pm the day  "before your procedure. If your procedure is before 11 am, take 2 additional tablets at 11 pm. If your procedure is after 11 am, take 2 additional tablets at 6 am. For additional instructions refer to your colonoscopy prep instructions. (Patient not taking: Reported on 10/2/2023) 4 tablet 0    polyethylene glycol (GOLYTELY) 236 g suspension The night before the exam at 6 pm drink an 8-ounce glass every 15 minutes until the jug is half empty. If you arrive before 11 AM: Drink the other half of the Golytely jug at 11 PM night before procedure. If you arrive after 11 AM: Drink the other half of the Golytely jug at 6 AM day of procedure. For additional instructions refer to your colonoscopy prep instructions. (Patient not taking: Reported on 10/2/2023) 4000 mL 0       No Known Allergies    Pt reports that he has never smoked. He quit smokeless tobacco use about 36 years ago. He reports current alcohol use. He reports that he does not use drugs.    The review of systems was positive for the following findings.  The remainder of the review of systems was unremarkable.    Exam:  BP (!) 122/95 (BP Location: Left arm)   Pulse 65   Temp 97.9  F (36.6  C) (Oral)   Resp 16   Ht 1.829 m (6' 0.01\")   Wt 79.4 kg (175 lb)   SpO2 100%   BMI 23.73 kg/m      Awake, Alert OX3  Lungs - CTA bilaterally  CV - RRR, no murmurs, distal pulses intact  Abd - soft, non-distended, non-tender, +BS  Extr - No cyanosis or edema    A/P 58 year old year old male in need of colonoscopy for personal history of polyps. Risks, benefits, alternatives, and complications were discussed including the possibility of perforation, bleeding, missed lesion and the patient agreed to proceed.    Sean Little, DO on 10/9/2023 at 11:52 AM      "

## 2023-10-09 NOTE — ANESTHESIA CARE TRANSFER NOTE
Patient: Brennan Vazquez    Procedure: Procedure(s):  COLONOSCOPY, FLEXIBLE, WITH LESION REMOVAL USING SNARE       Diagnosis: Colon cancer screening [Z12.11]  Diagnosis Additional Information: No value filed.    Anesthesia Type:   General     Note:    Oropharynx: oropharynx clear of all foreign objects  Level of Consciousness: drowsy          Vital Signs Stable: post-procedure vital signs reviewed and stable    Patient transferred to: Phase II    Handoff Report: Identifed the Patient, Identified the Reponsible Provider, Reviewed the pertinent medical history, Discussed the surgical course, Reviewed Intra-OP anesthesia mangement and issues during anesthesia, Set expectations for post-procedure period and Allowed opportunity for questions and acknowledgement of understanding  Vitals:  Vitals Value Taken Time   BP     Temp     Pulse     Resp     SpO2         Electronically Signed By: MARVEL Marrufo CRNA  October 9, 2023  12:55 PM

## 2023-10-09 NOTE — LETTER
Brennan Vazquez  [address]  [address]  [place] [address]      October 13, 2023    Dear Brennan,  This letter is written to inform you of the results of your recent colonoscopy.  Your examination showed polyp(s) in your ascending colon and sigmoid colon. All polyps were removed in their entirety and sent for review by a pathologist. As you will see on the pathology report below, the tissue(s) were tubular adenomatous polyps and hyperplastic polyps. Your examination was otherwise without abnormality.    Adenomatous polyps are entirely benign (non-cancerous); however, patients who have developed these polyps are at an increased risk for developing additional polyps in the future. If these are not eventually removed, there is a risk of developing colon cancer. We will advise more frequent examinations with you because of the risk associated with this type of polyp.  Hyperplastic polyps are entirely benign (non-cancerous) and rarely associated with the development of additional polyps or colorectal cancer.    Final Diagnosis   A: Large intestine, ascending, polypectomy:  -Serrated adenoma, no evidence of cytologic dysplasia     B: Large intestine, sigmoid, polypectomy:  -Hyperplastic polyp          Given these findings, your personal history of polyps, I recommend that you undergo a repeat colonoscopy in 5 year(s) for surveillance. We will enter you into a recall system so you receive a reminder closer to the time that you are due for repeat examination.     Please remember that this recommendation is made with the understanding that you are not experiencing persistent changes in bowel function, bleeding per rectum, and/or significant abdominal pain. If you experience these symptoms, please contact your primary care provider for a further evaluation.     If you have any questions or concerns about the results of your colonoscopy or the appropriate follow-up, please contact my assistant at  [phone]    Sincerely,      Sean Little, [facility]  ___

## 2023-10-12 ENCOUNTER — OFFICE VISIT (OUTPATIENT)
Dept: FAMILY MEDICINE | Facility: CLINIC | Age: 58
End: 2023-10-12
Payer: COMMERCIAL

## 2023-10-12 VITALS
TEMPERATURE: 97.2 F | WEIGHT: 178.2 LBS | HEART RATE: 55 BPM | SYSTOLIC BLOOD PRESSURE: 110 MMHG | BODY MASS INDEX: 24.14 KG/M2 | OXYGEN SATURATION: 99 % | RESPIRATION RATE: 20 BRPM | HEIGHT: 72 IN | DIASTOLIC BLOOD PRESSURE: 70 MMHG

## 2023-10-12 DIAGNOSIS — I82.811 SUPERFICIAL THROMBOSIS OF LEG, RIGHT: Primary | ICD-10-CM

## 2023-10-12 LAB
PATH REPORT.COMMENTS IMP SPEC: NORMAL
PATH REPORT.COMMENTS IMP SPEC: NORMAL
PATH REPORT.FINAL DX SPEC: NORMAL
PATH REPORT.GROSS SPEC: NORMAL
PATH REPORT.MICROSCOPIC SPEC OTHER STN: NORMAL
PATH REPORT.RELEVANT HX SPEC: NORMAL
PHOTO IMAGE: NORMAL

## 2023-10-12 PROCEDURE — 99213 OFFICE O/P EST LOW 20 MIN: CPT | Performed by: STUDENT IN AN ORGANIZED HEALTH CARE EDUCATION/TRAINING PROGRAM

## 2023-10-12 ASSESSMENT — PAIN SCALES - GENERAL: PAINLEVEL: NO PAIN (1)

## 2023-10-12 NOTE — PROGRESS NOTES
"1. Superficial thrombosis of leg, right  > symptoms are improving per patient (corroborated by documentation, previously pain was 5/10 with walking now at 3/10 with walking  > subjectively reports that his leg \"looks better\" than previously   - he has been compliant with NSAIDs, compression stockings, and moving   - given reported improvement in symptoms ok to continue with current treatment plan of compression socks, voltaren gel, NSAIDs (naproxen twice daily), and moving/walking     - per guidelines if a patient had a SVT >5cm he should receive LMWH, spoke with radiology to confirm size of the thrombus it measures roughly 2mm, unfortunately the report doesn't include the distance of the thrombus from the SFJ, will page vascular surgery for additional recommendations     Addendum:   - owning to ambiguity with guideline recommendations, azeem consulted vascular surgery who recommended placing the patient on anticoagulation for the next month   - vein clinic will get a hold of him earlier during the week for additional recommendations   - I called the patient and made him aware of the specialist recommendations, Elvis is aware to stop taking his NSAIDs, start taking Xarelto, he is aware that Xarelto can increase his risk for easy bruising/bleeding, and that he needs immediate evaluation if he sustains head injury while on blood thinners     Subjective   Elvis is a 58 year old, presenting for the following health issues:  Deep Vein Thrombosis (Right lower leg blood clot. Pain scale today is 1/10 while sitting and 3/10 while walking. )        10/12/2023     3:01 PM   Additional Questions   Roomed by Mariely MAO     Chief Complaint   Patient presents with    Deep Vein Thrombosis     Right lower leg blood clot. Pain scale today is 1/10 while sitting and 3/10 while walking.      Reports that his symptoms have improved and he is doing better with compression socks, exercising/walking, and has been using Naproxen 1-2 " times daily     Review of Systems   As above       Objective    /70   Pulse 55   Temp 97.2  F (36.2  C) (Tympanic)   Resp 20   Ht 1.829 m (6')   Wt 80.8 kg (178 lb 3.2 oz)   SpO2 99%   BMI 24.17 kg/m    Body mass index is 24.17 kg/m .  Physical Exam  Constitutional:       General: He is not in acute distress.     Appearance: Normal appearance.   HENT:      Head: Normocephalic.   Eyes:      General: No scleral icterus.        Right eye: No discharge.         Left eye: No discharge.      Extraocular Movements: Extraocular movements intact.      Conjunctiva/sclera: Conjunctivae normal.   Pulmonary:      Effort: Pulmonary effort is normal. No respiratory distress.   Musculoskeletal:         General: No swelling, tenderness, deformity or signs of injury. Normal range of motion.      Cervical back: Normal range of motion.   Skin:     General: Skin is warm.      Findings: No rash.      Comments: No rash on exposed skin   Neurological:      General: No focal deficit present.      Mental Status: He is alert and oriented to person, place, and time.   Psychiatric:         Mood and Affect: Mood normal.         Behavior: Behavior normal.

## 2023-10-12 NOTE — PATIENT INSTRUCTIONS
Voltaren gel in addition to naproxen twice daily for a total of 12 day, (you can finish up to the end of this weekend)

## 2023-10-24 ENCOUNTER — TELEPHONE (OUTPATIENT)
Dept: VASCULAR SURGERY | Facility: CLINIC | Age: 58
End: 2023-10-24
Payer: COMMERCIAL

## 2023-10-24 DIAGNOSIS — I80.01 SUPERFICIAL THROMBOPHLEBITIS OF RIGHT LEG: Primary | ICD-10-CM

## 2023-10-24 NOTE — TELEPHONE ENCOUNTER
----- Message from Catherine Allison sent at 10/24/2023 10:55 AM CDT -----  Hey,    I put in a right leg ultrasound order for this patient. Can someone please call this patient and schedule a right leg ultrasound and consult with Dr. Hodgson.    Please and thank you!    Catherine Allison, Surgery Scheduler  [facility]      ----- Message -----  From: Diogenes Hodgson MD  Sent: 10/14/2023   7:46 PM CDT  To: Margie Walker MD; Catherine Allison    Laddayana,   Need to see this patient.   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   Ms. Mansfieldanda, Please order reflux studies for right lower extremity.     Thanks,  Donte

## 2023-10-24 NOTE — PROGRESS NOTES
[facility] Collaborative Care Psychiatry Services [facility]  2023      Behavioral Health Clinician Progress Note    Patient Name: Brennan Vazquez           Service Type:  Individual      Service Location:   MyChart / Email (patient reached)     Session Start Time: 144pm  Session End Time: 208pm      Session Length: 16 - 37      Attendees: Patient     Service Modality:  Video Visit:      Provider verified identity through the following two step process.  Patient provided:  Patient  and Patient was verified at admission/transfer    Telemedicine Visit: The patient's condition can be safely assessed and treated via synchronous audio and visual telemedicine encounter.      Reason for Telemedicine Visit: Services only offered telehealth    Originating Site (Patient Location): Patient's home    Distant Site (Provider Location): [facility] MENTAL [facility] & ADDICTION [facility]    Consent:  The patient/guardian has verbally consented to: the potential risks and benefits of telemedicine (video visit) versus in person care; bill my insurance or make self-payment for services provided; and responsibility for payment of non-covered services.     Patient would like the video invitation sent by:  My Chart    Mode of Communication:  Video Conference via [facility]    Distant Location (Provider):  On-site    As the provider I attest to compliance with applicable laws and regulations related to telemedicine.    Visit Activities (Refresh list every visit): [facility] Only    Diagnostic Assessment Date: 2023 Austin Jackson M.D.   Treatment Plan Review Date: in the next few visits  See Flowsheets for today's PHQ-9 and EBONY-7 results  Previous PHQ-9:       1/10/2023     6:08 PM 2023     4:45 PM 10/2/2023     3:22 PM   PHQ-9 SCORE   PHQ-9 Total Score [facility] 3 (Minimal depression) 8 (Mild depression) 3 (Minimal depression)   PHQ-9 Total Score 3 8 3     Previous EBONY-7:       2022     9:35 AM 1/10/2023      6:08 PM 6/20/2023     8:32 PM   EBONY-7 SCORE   Total Score 7 (mild anxiety) 3 (minimal anxiety) 8 (mild anxiety)   Total Score 7 3 8     GAD2:       7/30/2023     4:56 PM 9/7/2023     5:56 PM 10/22/2023     9:40 AM   EBONY-2   Feeling nervous, anxious, or on edge 1 1 1    1   Not being able to stop or control worrying 1 1 1    1   EBONY-2 Total Score 2 2 2    2       DATA  Extended Session (60+ minutes): No  Interactive Complexity: No  Crisis: No  [facility] Patient: No    Treatment Objective(s) Addressed in This Session:  Target Behavior(s):  improve excitement, reduce worry and feeling down and improve sleep    Depressed Mood: Increase interest, engagement, and pleasure in doing things  Decrease frequency and intensity of feeling down, depressed, hopeless  Improve quantity and quality of night time sleep / decrease daytime naps  Anxiety: will experience a reduction in anxiety, will develop more effective coping skills to manage anxiety symptoms, and will increase ability to function adaptively  PTSD Symptom Management  Psychological distress related to Sleep Disturbance    Current Stressors / Issues:  Medication Questions/Requests: no     update: Taking Vybrid. Which helps with grey and gloomy days. Nothing excites them too much. Pt is having a few days where they feel down. Before COVID they had excitement. Pt will felt guilty about collecting a pension they earned. They are working to figure out the next steps and insurance. From taking the medication they slept well and felt good and then it tapered off. Job is physically active. Pt will they not to watch the news.   Pt has a blood clot in their leg and are working to get this fixed. Will see vascular surgery in November.   Side Effects: Vybrid has a bit of sexual side effects     Sleep: usually get 3-4 hours of sleep and not getting a lot, go to bed between 9-10 am, get up at 530 am for work, will wake up at 1 and maybe get back to sleep, last night slept until  330am, takes Lunesta- gets 3-4 hours of sleep  Usually will watch comedy, then go to bed, within 15 min fall asleep, when waking up will go to the restroom    [facility] recommends to get out of bed to improve sleep.     Had 2 hip replacements- no pain wakes them up     Impulsive spending/spending sprees:   Suicidality: no  Self-harm: no  Substance Use: beer occasionally   Caffeine: little caffeine   Therapist: tried in past, didn't see much results from chart review        Progress on Treatment Objective(s) / Homework:  Satisfactory progress - ACTION (Actively working towards change); Intervened by reinforcing change plan / affirming steps taken    CBT: Pt reports that they are working to not watch the news and are working which involves a lot of lifting and moving. Pt will watch comedy as a way to cope. He explains that COVID was hard and he felt helpless and was exposed to a lot in his job. He feels guilt for leaving and knowing others are still in a helping role. 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     CBTi: [facility] encourages pt to do relaxing activities before bed and to get up at the same time daily and get sunshine first thing in the morning. [facility] will send pt information about improving sleep through CBTi and ways to relax and wind down before bed. [facility] encourages pt to keep a sleep log and will explain more at the next visit.     Motivational Interviewing    MI Intervention: Co-Developed Goal: improve sleep and feeling excited about things, cope with PTSD sx and anxiety, Expressed Empathy/Understanding, Supported Autonomy, Collaboration, Evocation, Permission to raise concern or advise, Open-ended questions, Reflections: simple and complex, Change talk (evoked), and Reframe     Change Talk Expressed by the Patient: Need to change Committment to change Taking steps    Provider Response to Change Talk: E - Evoked more info  from patient about behavior change, A - Affirmed patient's thoughts, decisions, or attempts at behavior change, R - Reflected patient's change talk, and S - Summarized patient's change talk statements    Also provided psychoeducation about behavioral health condition, symptoms, and treatment options    Assessments completed prior to visit:  The following assessments were completed by patient for this visit:  GAD2:       7/30/2023     4:56 PM 9/7/2023     5:56 PM 10/22/2023     9:40 AM   EBONY-2   Feeling nervous, anxious, or on edge 1 1 1    1   Not being able to stop or control worrying 1 1 1    1   EBONY-2 Total Score 2 2 2    2       Care Plan review completed: No    Medication Review:  No changes to current psychiatric medication(s)    Medication Compliance:  Yes    Changes in Health Issues:   None reported    Chemical Use Review:   Substance Use: Chemical use reviewed, no active concerns identified      Tobacco Use: No current tobacco use.      Assessment: Current Emotional / Mental Status (status of significant symptoms):  Risk status (Self / Other harm or suicidal ideation)  Patient denies a history of suicidal ideation, suicide attempts, self-injurious behavior, homicidal ideation, homicidal behavior, and and other safety concerns  Patient denies current fears or concerns for personal safety.  Patient denies current or recent suicidal ideation or behaviors.  Patient denies current or recent homicidal ideation or behaviors.  Patient denies current or recent self injurious behavior or ideation.  Patient denies other safety concerns.  A safety and risk management plan has not been developed at this time, however patient was encouraged to call [place] [phone] should there be a change in any of these risk factors.    Appearance:   Appropriate   Eye Contact:   Good   Psychomotor Behavior: Normal   Attitude:   Cooperative  Interested Friendly  Orientation:   All  Speech   Rate / Production: Normal     Volume:  Normal   Mood:    Anxious  Sad   Affect:    Appropriate   Thought Content:  Clear   Thought Form:  Coherent  Logical   Insight:    Good     Diagnoses:  1. Mild episode of recurrent major depressive disorder (H24)    2. Generalized anxiety disorder    3. Trauma and stressor-related disorder        Collateral Reports Completed:  Communicated with: Austin Jackson M.D.     Plan: (Homework, other):  Patient was given information about behavioral services and encouraged to schedule a follow up appointment with the clinic [facility] in 1 month.  He was also given information about mental health symptoms and treatment options  and ways to track sleep and improve it.  CD Recommendations: No indications of CD issues. [facility] will work with pt to schedule a visit to talk about sleep.       Elle Chen, [facility]    ______________________________________________________________________    Integrated Primary Care Behavioral Health Treatment Plan    Patient's Name: Brennan Vazquez  YOB: 1965    Date of Creation: in the next few visits  Date Treatment Plan Last Reviewed/Revised: next few visits    DSM5 Diagnoses:   Mild episode of recurrent major depressive disorder (H24)    Generalized anxiety disorder    Trauma and stressor-related disorder      Psychosocial / Contextual Factors: retired as , anxiety as a child, sleep difficulty, works and gets exercise at job  PROMIS (reviewed every 90 days):   PROMIS 10-Global Health (only subscores and total score):       7/30/2023     4:57 PM 10/25/2023    12:57 PM   PROMIS-10 Scores Only   Global Mental Health Score 10 11   Global Physical Health Score 17 15   PROMIS TOTAL - SUBSCORES 27 26       Referral / Collaboration:  Referral to another professional/service is not indicated at this time.    Anticipated number of session for this episode of care: 5-6  Anticipation frequency of session: Monthly  Anticipated Duration of each session: 16-37 minutes  Treatment plan  will be reviewed in 90 days or when goals have been changed.       Patient has reviewed and agreed to the above plan.      ERIN Parker  October 25, 2023

## 2023-10-24 NOTE — TELEPHONE ENCOUNTER
Patient was called to make an appointment for ultrasound and visit with Dr. Hodgson. Patient stated he will call back.

## 2023-10-25 ENCOUNTER — VIRTUAL VISIT (OUTPATIENT)
Dept: BEHAVIORAL HEALTH | Facility: CLINIC | Age: 58
End: 2023-10-25
Payer: COMMERCIAL

## 2023-10-25 ENCOUNTER — VIRTUAL VISIT (OUTPATIENT)
Dept: PSYCHIATRY | Facility: CLINIC | Age: 58
End: 2023-10-25
Payer: COMMERCIAL

## 2023-10-25 DIAGNOSIS — F33.0 MAJOR DEPRESSIVE DISORDER, RECURRENT EPISODE, MILD (H): ICD-10-CM

## 2023-10-25 DIAGNOSIS — F41.1 GENERALIZED ANXIETY DISORDER: ICD-10-CM

## 2023-10-25 DIAGNOSIS — F33.0 MILD EPISODE OF RECURRENT MAJOR DEPRESSIVE DISORDER (H): Primary | ICD-10-CM

## 2023-10-25 DIAGNOSIS — F43.9 TRAUMA AND STRESSOR-RELATED DISORDER: ICD-10-CM

## 2023-10-25 PROCEDURE — 90832 PSYTX W PT 30 MINUTES: CPT | Mod: 95 | Performed by: COUNSELOR

## 2023-10-25 PROCEDURE — 99214 OFFICE O/P EST MOD 30 MIN: CPT | Mod: VID | Performed by: PSYCHIATRY & NEUROLOGY

## 2023-10-25 RX ORDER — VILAZODONE HYDROCHLORIDE 20 MG/1
20 TABLET ORAL DAILY
Qty: 90 TABLET | Refills: 0 | Status: SHIPPED | OUTPATIENT
Start: 2023-10-25 | End: 2024-01-25

## 2023-10-25 ASSESSMENT — COLUMBIA-SUICIDE SEVERITY RATING SCALE - C-SSRS
3. HAVE YOU BEEN THINKING ABOUT HOW YOU MIGHT KILL YOURSELF?: NO
1. HAVE YOU WISHED YOU WERE DEAD OR WISHED YOU COULD GO TO SLEEP AND NOT WAKE UP?: NO
4. HAVE YOU HAD THESE THOUGHTS AND HAD SOME INTENTION OF ACTING ON THEM?: NO
TOTAL  NUMBER OF ABORTED OR SELF INTERRUPTED ATTEMPTS LIFETIME: NO
TOTAL  NUMBER OF INTERRUPTED ATTEMPTS LIFETIME: NO
2. HAVE YOU ACTUALLY HAD ANY THOUGHTS OF KILLING YOURSELF?: NO
2. HAVE YOU ACTUALLY HAD ANY THOUGHTS OF KILLING YOURSELF?: NO
ATTEMPT LIFETIME: NO
5. HAVE YOU STARTED TO WORK OUT OR WORKED OUT THE DETAILS OF HOW TO KILL YOURSELF? DO YOU INTEND TO CARRY OUT THIS PLAN?: NO

## 2023-10-25 NOTE — PROGRESS NOTES
Virtual Visit Details    Type of service:  Video Visit     Originating Location (pt. Location): Home    Distant Location (provider location):  On-site  Platform used for Video Visit: [facility] Psychiatry Consult Note    IDENTIFICATION   Name: Brennan Vazquez   : 1965/58 year old      Sex:    @ male          Telemedicine Visit: The patient's condition can be safely assessed and treated via synchronous audio and visual telemedicine encounter.        Face to Face/patient Contact total time: 14 minutes  Pre Charting time: 1 minutes; Post charting time, communication and other activities: 1 minutes; Total time 16 minutes  2:15 PM -2:29 PM          SUBJECTIVE   Vilazodone having efficacy. Depression improved and in particular on gray and gloomy days. Maybe a little enthusiasm. Has done a couple of things fun. Still spent after 6 hours of work. Energy does remain somewhat low. Some motivaton difficulties continue. Hard to mow or sit/relax.     Generally has not slept good for 10 years. Vilazodone not clearly making sleep worse and things it likely isn't. Sometimes getting up to 5 hours with lunesta 2mg - taking nightly.       The following assessments were completed by patient for this visit:  PHQ9:       2021    12:00 PM 2021    11:00 AM 2022     9:34 AM 1/10/2023     6:08 PM 2023     4:45 PM 10/2/2023     3:22 PM   PHQ-9 SCORE   PHQ-9 Total Score MyChart   7 (Mild depression) 3 (Minimal depression) 8 (Mild depression) 3 (Minimal depression)   PHQ-9 Total Score 4 6 7 3 8 3     GAD7:       3/25/2021    10:35 AM 2021    12:00 PM 2021    11:00 AM 2022     9:35 AM 1/10/2023     6:08 PM 2023     8:32 PM   EBONY-7 SCORE   Total Score    7 (mild anxiety) 3 (minimal anxiety) 8 (mild anxiety)   Total Score 15 10 4 7 3 8     PROMIS 10-Global Health (only subscores and total score):       2023     4:57 PM 10/25/2023    12:57 PM   PROMIS-10 Scores Only   Global Mental  Health Score 10 11   Global Physical Health Score 17 15   PROMIS TOTAL - SUBSCORES 27 26             1/10/2023     6:08 PM 7/30/2023     4:45 PM 10/2/2023     3:22 PM   PHQ   PHQ-9 Total Score 3 8 3   Q9: Thoughts of better off dead/self-harm past 2 weeks Not at all Not at all Not at all          4/11/2022     9:35 AM 1/10/2023     6:08 PM 6/20/2023     8:32 PM   EBONY-7 SCORE   Total Score 7 (mild anxiety) 3 (minimal anxiety) 8 (mild anxiety)   Total Score 7 3 8        OBJECTIVE     Vital Signs:   There were no vitals taken for this visit.    Labs:  na    Current Medications:  Current Outpatient Medications   Medication    alendronate (FOSAMAX) 70 MG tablet    ALPRAZolam (XANAX) 0.25 MG tablet    aspirin 81 MG EC tablet    atenolol (TENORMIN) 25 MG tablet    calcium carbonate-vitamin D3 (CALTRATE 600 PLUS D3) 600 mg(1,500mg) -400 unit per tablet    eszopiclone (LUNESTA) 2 MG tablet    multivitamin therapeutic tablet    naproxen (NAPROSYN) 500 MG tablet    rivaroxaban ANTICOAGULANT (XARELTO) 10 MG TABS tablet    sildenafil (REVATIO) 20 MG tablet    simvastatin (ZOCOR) 10 MG tablet    vilazodone (VIIBRYD) 10 MG TABS tablet    vitamin B-12 (CYANOCOBALAMIN) 100 MCG tablet     No current facility-administered medications for this visit.        The [place] Prescription Monitoring Program has been reviewed and there are no concerns about diversionary activity for controlled substances at this time.        ADDED HISTORY   Went to [email or web address] with a friend a few weeks ago.     MENTAL STATUS EXAMINATION:   Appearance: Good attention to grooming and hygiene  Attitude: Cooperative  Eye Contact: Good  Gait and Station: Sitting  Psychomotor Behavior: Within normal limits  Oriented to: Grossly person place and time  Attention Span and Concentration: Grossly intact  Speech: Depressed tone  Language: English  Mood:  sad   Affect: Mildly constricted  Associations:  no loose associations  Thought Process:  logical, linear and goal  oriented  Thought Content: No evidence of delusions or suicidal or homicidal ideation plan or intent  Memory: Grossly intact  Fund of Knowledge: Good  Insight: Good   Judgment:  intact, adequate for safety  Impulse Control:  intact        DIAGNOSES:   Major depressive disorder, mild, single episode, in partial remission  Unspecified anxiety disorder  Insomnia disorder      ASSESSMENT:   Undergoing depression symptoms in context of previous challenges as a  and half-way.  May have had some degree of proneness to anxiety slightly.  Has not tolerated medications thus far, pursue Viibryd and Pharmacogenomic testing.  Switch Lunesta to trazodone.    Today Brennan Vazquez reports no suicidal ideation. In addition, he has notable risk factors for self-harm, including anxiety. However, risk is mitigated by commitment to family, absence of past attempts, and history of seeking help when needed. Therefore, based on all available evidence including the factors cited above, he does not appear to be at imminent risk for self-harm, does not meet criteria for a 72-hr hold, and therefore remains appropriate for ongoing outpatient level of care.       PLAN:     Patient advised of consultative model. Patient will continue to be seen for ongoing consultation and stabilization.  Does not meet criteria for involuntary treatment or hospitalization  Trial of Viibryd 7/31/23 10 mg daily mild to moderate efficacy, reduced libido/sexual dysfunction, some remnant challenges with energy and motivation => 10/25/23 20 mg po qday -Risks, benefits and alternatives discussed.  Patient provides verbal consent to treatment.   Continue Lunesta 2 mg nightly-Risks, benefits and alternatives discussed.  Patient provides verbal consent to treatment.  Advised of recommendation against long-term use, memory  Alprazolam 0.25 mg p.o. daily as needed anxiety  Backup - duloxetine 30 mg po qday  Discontinue trazodone (nasal conguestion)  Labs  -reviewed  CBTi, behavioral activation with Elle FORBES  Return in 4-6 weeks    Administrative Billing:   Time spent with patient was greater than 50% of time and/or significant time was spent in counseling and coordination of care regarding above diagnoses and treatment plan. Pre charting time and post charting time/documentation/coordination are done on date of service.      Signed:   Austin Jackson M.D.  [facility] Psychiatry Service    Disclaimer: This note consists of symbols derived from keyboarding, dictation and/or voice recognition software. As a result, there may be errors in the script that have gone undetected. Please consider this when interpreting information found in this chart.

## 2023-10-25 NOTE — NURSING NOTE
Is the patient currently in the state of [place]? YES    Visit mode:VIDEO    If the visit is dropped, the patient can be reconnected by: VIDEO VISIT: Text to cell phone:   Telephone Information:   Mobile [phone]       Will anyone else be joining the visit? NO  (If patient encounters technical issues they should call [phone]    How would you like to obtain your AVS? MyChart    Are changes needed to the allergy or medication list? Pt stated no changes to allergies and Pt stated no med changes    Reason for visit: SANTOSH JOSEPH

## 2023-10-31 ENCOUNTER — MYC MEDICAL ADVICE (OUTPATIENT)
Dept: FAMILY MEDICINE | Facility: CLINIC | Age: 58
End: 2023-10-31
Payer: COMMERCIAL

## 2023-11-01 NOTE — TELEPHONE ENCOUNTER
Covering for primary/ordering provider    Patient should make appointment to discuss anticoagulation alternatives that are cheaper

## 2023-11-08 ENCOUNTER — VIRTUAL VISIT (OUTPATIENT)
Dept: BEHAVIORAL HEALTH | Facility: CLINIC | Age: 58
End: 2023-11-08
Payer: COMMERCIAL

## 2023-11-08 DIAGNOSIS — F33.0 MILD EPISODE OF RECURRENT MAJOR DEPRESSIVE DISORDER (H): Primary | ICD-10-CM

## 2023-11-08 DIAGNOSIS — F43.9 TRAUMA AND STRESSOR-RELATED DISORDER: ICD-10-CM

## 2023-11-08 DIAGNOSIS — F41.1 GENERALIZED ANXIETY DISORDER: ICD-10-CM

## 2023-11-08 PROCEDURE — 90832 PSYTX W PT 30 MINUTES: CPT | Mod: 95 | Performed by: COUNSELOR

## 2023-11-08 ASSESSMENT — SLEEP AND FATIGUE QUESTIONNAIRES
HOW LIKELY ARE YOU TO NOD OFF OR FALL ASLEEP WHILE WATCHING TV: SLIGHT CHANCE OF DOZING
HOW LIKELY ARE YOU TO NOD OFF OR FALL ASLEEP WHILE SITTING INACTIVE IN A PUBLIC PLACE: WOULD NEVER DOZE
HOW LIKELY ARE YOU TO NOD OFF OR FALL ASLEEP WHILE WATCHING TV: SLIGHT CHANCE OF DOZING
HOW LIKELY ARE YOU TO NOD OFF OR FALL ASLEEP WHILE LYING DOWN TO REST IN THE AFTERNOON WHEN CIRCUMSTANCES PERMIT: SLIGHT CHANCE OF DOZING
HOW LIKELY ARE YOU TO NOD OFF OR FALL ASLEEP WHILE SITTING QUIETLY AFTER LUNCH WITHOUT ALCOHOL: SLIGHT CHANCE OF DOZING
HOW LIKELY ARE YOU TO NOD OFF OR FALL ASLEEP WHILE LYING DOWN TO REST IN THE AFTERNOON WHEN CIRCUMSTANCES PERMIT: SLIGHT CHANCE OF DOZING
HOW LIKELY ARE YOU TO NOD OFF OR FALL ASLEEP WHILE SITTING AND READING: SLIGHT CHANCE OF DOZING
HOW LIKELY ARE YOU TO NOD OFF OR FALL ASLEEP WHILE SITTING AND TALKING TO SOMEONE: WOULD NEVER DOZE
HOW LIKELY ARE YOU TO NOD OFF OR FALL ASLEEP IN A CAR, WHILE STOPPED FOR A FEW MINUTES IN TRAFFIC: WOULD NEVER DOZE
HOW LIKELY ARE YOU TO NOD OFF OR FALL ASLEEP IN A CAR, WHILE STOPPED FOR A FEW MINUTES IN TRAFFIC: WOULD NEVER DOZE
HOW LIKELY ARE YOU TO NOD OFF OR FALL ASLEEP WHILE SITTING INACTIVE IN A PUBLIC PLACE: WOULD NEVER DOZE
HOW LIKELY ARE YOU TO NOD OFF OR FALL ASLEEP WHEN YOU ARE A PASSENGER IN A CAR FOR AN HOUR WITHOUT A BREAK: WOULD NEVER DOZE
HOW LIKELY ARE YOU TO NOD OFF OR FALL ASLEEP WHEN YOU ARE A PASSENGER IN A CAR FOR AN HOUR WITHOUT A BREAK: WOULD NEVER DOZE
HOW LIKELY ARE YOU TO NOD OFF OR FALL ASLEEP WHILE SITTING AND READING: SLIGHT CHANCE OF DOZING
HOW LIKELY ARE YOU TO NOD OFF OR FALL ASLEEP WHILE SITTING QUIETLY AFTER LUNCH WITHOUT ALCOHOL: SLIGHT CHANCE OF DOZING
HOW LIKELY ARE YOU TO NOD OFF OR FALL ASLEEP WHILE SITTING AND TALKING TO SOMEONE: WOULD NEVER DOZE

## 2023-11-08 NOTE — PROGRESS NOTES
[facility]  2023      Behavioral Health Clinician Progress Note    Patient Name: Brennan Vazquez           Service Type:  Individual      Service Location:   MyChart / Email (patient reached)     Session Start Time: 128pm  Session End Time: 205pm      Session Length: 16 - 37      Attendees: Patient     Service Modality:  Video Visit:      Provider verified identity through the following two step process.  Patient provided:  Patient  and Patient was verified at admission/transfer    Telemedicine Visit: The patient's condition can be safely assessed and treated via synchronous audio and visual telemedicine encounter.      Reason for Telemedicine Visit: Services only offered telehealth    Originating Site (Patient Location): Patient's home    Distant Site (Provider Location): [facility] MENT[facility] HEALTH & [facility]    Consent:  The patient/guardian has verbally consented to: the potential risks and benefits of telemedicine (video visit) versus in person care; bill my insurance or make self-payment for services provided; and responsibility for payment of non-covered services.     Patient would like the video invitation sent by:  My Chart    Mode of Communication:  Video Conference via [facility]    Distant Location (Provider):  On-site    As the provider I attest to compliance with applicable laws and regulations related to telemedicine.    Visit Activities (Refresh list every visit): [facility] Only    Diagnostic Assessment Date: 2023 Austin Jackson M.D.   Treatment Plan Review Date: in the next few visits  See Flowsheets for today's PHQ-9 and EBONY-7 results  Previous PHQ-9:       1/10/2023     6:08 PM 2023     4:45 PM 10/2/2023     3:22 PM   PHQ-9 SCORE   PHQ-9 Total Score [facility] 3 (Minimal depression) 8 (Mild depression) 3 (Minimal depression)   PHQ-9 Total Score 3 8 3     Previous EBONY-7:       2022     9:35 AM 1/10/2023      6:08 PM 6/20/2023     8:32 PM   EBONY-7 SCORE   Total Score 7 (mild anxiety) 3 (minimal anxiety) 8 (mild anxiety)   Total Score 7 3 8     GAD2:       7/30/2023     4:56 PM 9/7/2023     5:56 PM 10/22/2023     9:40 AM   EBONY-2   Feeling nervous, anxious, or on edge 1 1 1    1   Not being able to stop or control worrying 1 1 1    1   EBONY-2 Total Score 2 2 2    2       DATA  Extended Session (60+ minutes): No  Interactive Complexity: No  Crisis: No  [facility] Patient: No    Treatment Objective(s) Addressed in This Session:  Target Behavior(s):  improve excitement, reduce worry and feeling down and improve sleep    Depressed Mood: Increase interest, engagement, and pleasure in doing things  Decrease frequency and intensity of feeling down, depressed, hopeless  Improve quantity and quality of night time sleep / decrease daytime naps  Anxiety: will experience a reduction in anxiety, will develop more effective coping skills to manage anxiety symptoms, and will increase ability to function adaptively  PTSD Symptom Management  Psychological distress related to Sleep Disturbance    Current Stressors / Issues:  Pt stopped taking CDB gummies to help with sleep and this has helped and pt is working on a routine for sleep. Trying to read before bed and turning off their phone. Their Luesta helps then fall asleep pretty quick. Some days they can tell that they have tossed and turned. He can tell when it is bad his head and eyes hurt.   He will lower his intake of liquids before bed. He denies any chronic pain. Pt has a blood clot in their leg.     Even after working and coming home at 1pm they will feel tired and nap briefly. They are excited for bed and looking forwards to it. They will go to bed between 9-10am and that then weekends will be in bed for a bit after waking.     Their right nostril is clogged and they have trouble breathing. He had surgery on this and it hasn't seemed to help much. Has exercising a couple hours before  bed as well to see if this helps.      Some nights when feeling like they slept they will feel fatigue.     They liked to get [place] still [place] felt [place] and wasn't wanting to do it and there was wind.     They won't be able to play pickle bill. They will always push themselves to go out to dinner with friends. With days that are cloudy and grey the medication has helped them to get out of bed.     The winter drags on and is dark. Grand kids and kids help them to feel excited. Pt would be doing tennis, pickle ball, go to [place] and being out on the boat. He has a membership for the [employer] for exercise.     Progress on Treatment Objective(s) / Homework:  Satisfactory progress - ACTION (Actively working towards change); Intervened by reinforcing change plan / affirming steps taken    CBT: Pt has read before bed to start to wind down and increase exercise a bit an hour and a half before bed. Pt denies feeling anxious about sleep. He reports that this use to be a concern for him. He says that feeling fatigued has kept him from doing things. Pt reports that having grey gloomy days affects depressive symptoms. He says he feels like the medication Dr. Jackson prescribed is working for him and helping.     [facility] encourages pt to explore other activities at the [employer] that he might be interested since it is a ways to play hockey in [place] and to see if he can get his wife on board to go with him.   CBTi: [facility] praises and celebrates changed in sleep hygiene that pt has already made. [facility] encourages pt to continue to exercise, get out of bed on weekends as able instead of being in bed a bit longer. [facility] recommends pt to use a therapy light 10,000 lux in the morning briefly to see if this will help with mood and fatigue. [facility] reviews the Stop Bang questionnaire with him and says that this may be a concern and that it is good that he has a sleep medicine appt  soon. This could also be impacting his fatigue. [facility] encourages pt to schedule fun activities into his day to see if this will help reduce depressive symptoms. He denies feeling anxious about sleep and reports that this use to be a concern.     Motivational Interviewing    MI Intervention: Co-Developed Goal: improve sleep and feeling excited about things and reducing fatigue, Expressed Empathy/Understanding, Supported Autonomy, Collaboration, Evocation, Permission to raise concern or advise, Open-ended questions, Reflections: simple and complex, Change talk (evoked), and Reframe     Change Talk Expressed by the Patient: Need to change Committment to change Taking steps    Provider Response to Change Talk: E - Evoked more info from patient about behavior change, A - Affirmed patient's thoughts, decisions, or attempts at behavior change, R - Reflected patient's change talk, and S - Summarized patient's change talk statements    Also provided psychoeducation about behavioral health condition, symptoms, and treatment options    Assessments completed prior to visit:  The following assessments were completed by patient for this visit:  GAD2:       7/30/2023     4:56 PM 9/7/2023     5:56 PM 10/22/2023     9:40 AM   EBONY-2   Feeling nervous, anxious, or on edge 1 1 1    1   Not being able to stop or control worrying 1 1 1    1   EBONY-2 Total Score 2 2 2    2           Insomnia Severity Index    Difficulty falling asleep 1    Difficulty staying asleep 2    Problems waking up too early 2    How SATISFIED/DISSATISFIED are you with your CURRENT sleep pattern? 3    How NOTICEABLE to others do you think your sleep problem is in terms of impairing the quality of your life? 2    How WORRIED/DISTRESSED are you about your current sleep problem? 3    To what extent do you consider your sleep problem to INTERFERE with your daily functioning (e.g. daytime fatigue, mood, ability to function at work/daily chores, concentration, memory, mood,  etc.) CURRENTLY? 3    Total Score 16           11/8/2023     1:23 PM   STOP BANG Questionnaire (  2008, the American Society of Anesthesiologists, Inc[employer]   1. Snoring - Do you snore loudly (louder than talking or loud enough to be heard through closed doors)? Yes   2. Tired - Do you often feel tired, fatigued, or sleepy during daytime? Yes   3. Observed - Has anyone observed you stop breathing during your sleep? No   4. Blood pressure - Do you have or are you being treated for high blood pressure? Yes   5. BMI - BMI more than 35 kg/m2? No   6. Age - Age over 50 yr old? Yes   7. Neck circumference - Neck circumference greater than 40 cm? Yes   8. Gender - Gender male? Yes   STOP BANG Score (MC): 5 (High risk of GERALD)     EPWORTH SLEEPINESS SCALE    Likeliness of dozing or falling  asleep:    Sitting and reading: Slight chance of dozing    Watching TV: Slight chance of dozing    Sitting, inactive in a public place (e.g. a theatre or a meeting): Would never doze    As a passenger in a car for an hour without a break: Would never doze    Lying down to rest in the afternoon when circumstances permit: Slight chance of dozing    Sitting and talking to someone: Would never doze    Sitting quietly after a lunch without alcohol: Slight chance of dozing    In a car, while stopped for a few minutes in traffic: Would never doze    Total score - [place]: 4      INSOMNIA SEVERITY INDEX (MACEY)      The Insomnia Severity Index measures the severity of insomnia symptoms over the past two weeks.    1. Difficulty falling asleep: Mild    2. Difficulty staying asleep: Moderate    3. Problems waking up too early: Moderate    4. How SATISFIED/DISSATISFIED are youwith your CURRENT sleep pattern? Moderately Satisfied    5. How NOTICEABLE to others do you think your sleep problem is in terms of impairing the quality of your life? A Little      6. How WORRIED/DISTRESSED are you about your sleep problem?  Somewhat    7. To what extent do you consider your sleep problem to INTERFERE with your daily functioning (e.g. daytime fatigue, mood, ability to functon at work/daily chores, concentration, memory, mood, etc.) CURRENTLY?   Much     MACEY Total Score: 13    Guidelines for Scoring/Interpretation:   0-7 = No clinically significant insomnia   8-14 = Subthreshold insomnia   15-21 = Clinical insomnia (moderate severity)  22-28 = Clinical insomnia (severe)      STOP BANG     1. Snoring: Do you snore loudly (louder than talking or loud enough to be heard through closed doors)?  Yes    2. Tired: Do you often feel tired, fatigued, or sleepy during daytime?  Yes    3. Observed: Has anyone observed you stop breathing during your sleep?  No    4. Blood pressure: Do you have or are you being treated for high blood pressure?  Yes    5. BMI: BMI more than 35 kg/m2?  No    6. Age: Age over 50 yr old?  Yes    7. Neck circumference: Neck circumference greater than 40 cm?  Yes    8. Gender: Gender male?  Yes    STOP-BANG Total Score: 6    GERALD Risk  0-2 Low risk for GERALD  3-4  Intermediate risk for GERALD  5-8 High risk        Care Plan review completed: No    Medication Review:  No changes to current psychiatric medication(s)    Medication Compliance:  Yes    Changes in Health Issues:   None reported    Chemical Use Review:   Substance Use: Chemical use reviewed, no active concerns identified      Tobacco Use: No current tobacco use.      Assessment: Current Emotional / Mental Status (status of significant symptoms):  Risk status (Self / Other harm or suicidal ideation)  Patient denies a history of suicidal ideation, suicide attempts, self-injurious behavior, homicidal ideation, homicidal behavior, and and other safety concerns  Patient denies current fears or concerns for personal safety.  Patient denies current or recent suicidal ideation or behaviors.  Patient denies current or recent homicidal ideation or behaviors.  Patient denies  current or recent self injurious behavior or ideation.  Patient denies other safety concerns.  A safety and risk management plan has not been developed at this time, however patient was encouraged to call [place] [phone] should there be a change in any of these risk factors.    Appearance:   Appropriate   Eye Contact:   Good   Psychomotor Behavior: Normal   Attitude:   Cooperative  Interested Friendly  Orientation:   All  Speech   Rate / Production: Normal    Volume:  Normal   Mood:    Sad   Affect:    Appropriate   Thought Content:  Clear   Thought Form:  Coherent  Logical   Insight:    Good     Diagnoses:  1. Mild episode of recurrent major depressive disorder (H24)    2. Generalized anxiety disorder    3. Trauma and stressor-related disorder          Collateral Reports Completed:  Communicated with: Austin Jackson M.D.     Plan: (Homework, other):  Patient was given information about behavioral services and encouraged to schedule a follow up appointment with the clinic [facility] in 1 month.  He was also given information about mental health symptoms and treatment options  and ways to track sleep and improve it. CD Recommendations: No indications of CD issues. [facility] will send a [employer] message on the 22nd to check in to see how pt is doing related to depressive and sleep sx.       Elle Chen, [facility]    ______________________________________________________________________    Integrated Primary Care Behavioral Health Treatment Plan    Patient's Name: Brennan Vazquez  YOB: 1965    Date of Creation: in the next few visits  Date Treatment Plan Last Reviewed/Revised: next few visits    DSM5 Diagnoses:   Mild episode of recurrent major depressive disorder (H24)    Generalized anxiety disorder    Trauma and stressor-related disorder      Psychosocial / Contextual Factors: retired as , anxiety as a child, sleep difficulty, works and gets exercise at job  PROMIS (reviewed every 90 days):   PROMIS  10-Global Health (only subscores and total score):       7/30/2023     4:57 PM 10/25/2023    12:57 PM   PROMIS-10 Scores Only   Global Mental Health Score 10 11   Global Physical Health Score 17 15   PROMIS TOTAL - SUBSCORES 27 26       Referral / Collaboration:  Referral to another professional/service is not indicated at this time.    Anticipated number of session for this episode of care: 5-6  Anticipation frequency of session: Monthly  Anticipated Duration of each session: 16-37 minutes  Treatment plan will be reviewed in 90 days or when goals have been changed.       Patient has reviewed and agreed to the above plan.      Elle Chen, [facility]  November 8, 2023

## 2023-11-09 ENCOUNTER — VIRTUAL VISIT (OUTPATIENT)
Dept: SLEEP MEDICINE | Facility: CLINIC | Age: 58
End: 2023-11-09
Attending: FAMILY MEDICINE
Payer: COMMERCIAL

## 2023-11-09 VITALS
WEIGHT: 175 LBS | BODY MASS INDEX: 23.7 KG/M2 | DIASTOLIC BLOOD PRESSURE: 76 MMHG | SYSTOLIC BLOOD PRESSURE: 118 MMHG | HEIGHT: 72 IN

## 2023-11-09 DIAGNOSIS — G47.30 SLEEP-RELATED BREATHING DISORDER: Primary | ICD-10-CM

## 2023-11-09 DIAGNOSIS — G47.00 INSOMNIA, UNSPECIFIED TYPE: ICD-10-CM

## 2023-11-09 PROCEDURE — 99205 OFFICE O/P NEW HI 60 MIN: CPT | Mod: VID | Performed by: NURSE PRACTITIONER

## 2023-11-09 RX ORDER — ZOLPIDEM TARTRATE 10 MG/1
TABLET ORAL
Qty: 2 TABLET | Refills: 0 | Status: SHIPPED | OUTPATIENT
Start: 2023-11-09 | End: 2023-12-15

## 2023-11-09 RX ORDER — ZOLPIDEM TARTRATE 10 MG/1
TABLET ORAL
Qty: 1 TABLET | Refills: 0 | Status: SHIPPED | OUTPATIENT
Start: 2023-11-09 | End: 2023-11-09

## 2023-11-09 ASSESSMENT — PAIN SCALES - GENERAL: PAINLEVEL: NO PAIN (0)

## 2023-11-09 NOTE — PROGRESS NOTES
Outpatient Sleep Medicine Consultation:      Name: Brennan Vazquez MRN# [id number]   Age: 58 year old YOB: 1965     Date of Consultation: November 9, 2023  Consultation is requested by: Veronica Anne DO  [address] [place] [address] Veronica Anne  Primary care provider: Veronica Anne       Reason for Sleep Consult:     Brennan Vazquez is sent by Veronica Anne for a sleep consultation regarding insomnia.    Patient s Reason for visit  Brennan Vazquez main reason for visit: Snoring and I don't ever feel like I slept good  Patient states problem(s) started: 10 years ago  Brennan Vazquez's goals for this visit: To do an at home sleep study.           Assessment and Plan:     Summary Sleep Diagnoses:  Sleep-related breathing disorder  Insomnia, sleep initiation, sleep maintenance  Nonrestorative sleep   Socially disruptive snoring  Multiple nocturnal awakenings  Restless leg syndrome    Comorbid Diagnoses:  Hypertension  Hyperlipidemia  Nasal septal deviation, nasal turbinate hypertrophy  Chronic rhinitis  Osteoporosis  Osteoarthritis of left hip  Anxiety  Seasonal allergies    Summary Recommendations:  Orders Placed This Encounter   Procedures    HST-Home Sleep Apnea Test - Noxturnal Returnable   1.  Recommend patient undergo sleep testing.  Patient STOP-BANG score is 5/8 indicating an increased pretest probability for obstructive sleep apnea.  HST ordered.  Zolpidem 10 mg ordered to assist in sleep during the study.  2.  Recommend patient return to clinic approximately 2 weeks after sleep study has been completed.  At that time we will review the results as well as to determine plan of care going forward.  3.  Recommend patient optimize sleep schedule as well as his sleep hygiene practices.  This will mitigate any future sleep intrusion.  4.  Recommend patient employ safe driving practices such as not driving a car if drowsy.  5.  Weight management to BMI of 30    Summary  Counseling:    Sleep Testing Reviewed: HST  Obstructive Sleep Apnea Reviewed   -Discussed pathophysiology of obstructive sleep apnea as well as central sleep apnea   -Discussed all methods of treatment of sleep apnea including PAP therapy, mandibular advancement device, surgical options  Complications of Untreated Sleep Apnea Reviewed in the context of his medical diagnoses      Medical Decision-making:   Educational materials provided in instructions    Total time spent reviewing medical records, history and physical examination, review of previous testing and interpretation as well as documentation on this date: 60 minutes    CC: Veronica Anne          History of Present Illness:   Brennan Vazquez is a pleasant 58-year-old gentleman who presents to the sleep medicine clinic upon the advice of his medical provider, Veronica Anne, for insomnia.    Patient notices significant challenges with insomnia, both sleep initiation as well as sleep maintenance.  He has used different agents and currently uses Lunesta to assist him with his sleep.  In the past he has used Ambien.    Patient also notices that he snores significantly and when he awakens he does not feel as if he slept well.  He attempts to go to sleep between 9 and 10:00 PM notices he has a 10-20-minute sleep latency with his Lunesta and awakens 1-3 episodes per night for elimination purposes as well as external stimulation.  It may take him 30-90 minutes to return to sleep.  He starts his day at 5:30 AM with the use of an alarm.      We did discuss sleep hygiene practices and methods he could show improvement upon which could reflect an improved sleep.  Patient does nap occasionally for periods of about 10 minutes and he does not feel better after his naps.  He does doze unintentionally as well.    Patient suffers from socially disruptive snoring, snort arousals, morning headache, nasal congestion upon awakening, dry mouth upon awakening, he experiences  heart racing during the night, headaches in the morning, as well as depression and anxiety related to sleep.    Patient would prefer a home sleep test, and that request is accommodated with zolpidem 10 mg p.o. for the night of his sleep.  Patient will return to the sleep clinic approximately 2 weeks later when we will discuss his sleep testing results as well as determine the next steps in his plan of care.    Past Sleep Evaluations: None    SLEEP-WAKE SCHEDULE:     Work/School Days: Patient goes to school/work: Yes   Usually gets into bed at between 9pm and 10pm  Takes patient about 10 to 20 minutes after taking Lunesta to fall asleep  Has trouble falling asleep Always without Lunesta nights per week  Wakes up in the middle of the night 1 to 3 times times.  Wakes up due to External stimuli (bed partner, pets, noise, etc);Use the bathroom  He has trouble falling back asleep 7 times a week.   It usually takes 30 to 90 minutes to get back to sleep  Patient is usually up at 5:30 am  Uses alarm: Yes    Weekends/Non-work Days/All Other Days:  Usually gets into bed at between 10pm and midnight   Takes patient about 10 to 20 minutes with Lunesta to fall asleep  Patient is usually up at Between 8am and 9am  Uses alarm: No    Sleep Need  Patient gets  3 to 6 hours sleep on average   Patient thinks he needs about 6 to 8 hours sleep    Brennan Vazquez prefers to sleep in this position(s): Back;Side   Patient states they do the following activities in bed: Read;Use phone, computer, or tablet    Naps  Patient takes a purposeful nap 1-2 times a week and naps are usually 10 minutes in duration  He feels better after a nap: No  He dozes off unintentionally 1-2 days per week  Patient has had a driving accident or near-miss due to sleepiness/drowsiness: No      SLEEP DISRUPTIONS:    Breathing/Snoring  Patient snores:Yes  Other people complain about his snoring: Yes  Patient has been told he stops breathing in his sleep:No  He has  issues with the following: Morning headaches;Stuffy nose when you wake up;Getting up to urinate more than once    Movement:  Patient gets pain, discomfort, with an urge to move:  No  It happens when he is resting:     It happens more at night:     Patient has been told he kicks his legs at night:  Yes     Behaviors in Sleep:  Brennna Vazquez has experienced the following behaviors while sleeping:    He has experienced sudden muscle weakness during the day: No      Is there anything else you would like your sleep provider to know:          CAFFEINE AND OTHER SUBSTANCES:    Patient consumes caffeinated beverages per day:  0  Last caffeine use is usually: None  List of any prescribed or over the counter stimulants that patient takes:    List of any prescribed or over the counter sleep medication patient takes: Lunesta  List of previous sleep medications that patient has tried: Ambien  Patient drinks alcohol to help them sleep: No  Patient drinks alcohol near bedtime: No    Family History:  Patient has a family member been diagnosed with a sleep disorder: No            SCALES:    EPWORTH SLEEPINESS SCALE         11/8/2023     1:23 PM    [place] Sleepiness Scale ( LAUREANO Landaverde  [id number]<br>ESS - [place]/English - Final version - 21 Nov 07 - [facility] [place].)   Sitting and reading Slight chance of dozing   Watching TV Slight chance of dozing   Sitting, inactive in a public place (e.g. a theatre or a meeting) Would never doze   As a passenger in a car for an hour without a break Would never doze   Lying down to rest in the afternoon when circumstances permit Slight chance of dozing   Sitting and talking to someone Would never doze   Sitting quietly after a lunch without alcohol Slight chance of dozing   In a car, while stopped for a few minutes in traffic Would never doze   [place] Score (MC) 4   [place] Score (Sleep) 4    4         INSOMNIA SEVERITY INDEX (MACEY)          11/8/2023     2:20 PM   Insomnia Severity Index  (MACEY)   Difficulty falling asleep 1   Difficulty staying asleep 2   Problems waking up too early 2   How SATISFIED/DISSATISFIED are you with your CURRENT sleep pattern? 3   How NOTICEABLE to others do you think your sleep problem is in terms of impairing the quality of your life? 2   How WORRIED/DISTRESSED are you about your current sleep problem? 3   To what extent do you consider your sleep problem to INTERFERE with your daily functioning (e.g. daytime fatigue, mood, ability to function at work/daily chores, concentration, memory, mood, etc.) CURRENTLY? 3   MACEY Total Score 16       Guidelines for Scoring/Interpretation:  Total score categories:  0-7 = No clinically significant insomnia   8-14 = Subthreshold insomnia   15-21 = Clinical insomnia (moderate severity)  22-28 = Clinical insomnia (severe)  Used via courtesy of www.Kontest.va.gov with permission from Lyle Sagastume PhD., [facility]      STOP BANG 6/8 11/9/2023     1:38 PM   STOP BANG Questionnaire (  2008, the American Society of Anesthesiologists, Inc. [employer]   B/P Clinic: 118/76   BMI Clinic: 23.73         GAD7        6/20/2023     8:32 PM   EBONY-7    1. Feeling nervous, anxious, or on edge 1   2. Not being able to stop or control worrying 2   3. Worrying too much about different things 2   4. Trouble relaxing 1   5. Being so restless that it is hard to sit still 0   6. Becoming easily annoyed or irritable 1   7. Feeling afraid, as if something awful might happen 1   EBONY-7 Total Score 8   If you checked any problems, how difficult have they made it for you to do your work, take care of things at home, or get along with other people? Somewhat difficult         CAGE-AID        7/30/2023     4:58 PM   CAGE-AID Flowsheet   Have you ever felt you should Cut down on your drinking or drug use? 0   Have people Annoyed you by criticizing your drinking or drug use? 0   Have you ever felt bad or Guilty about your  "drinking or drug use? 1   Have you ever had a drink or used drugs first thing in the morning to steady your nerves or to get rid of a hangover? (Eye opener) 0   CAGE-AID SCORE 1   Have you ever felt you should Cut down on your drinking or drug use? No   Have people Annoyed you by criticizing your drinking or drug use? No   Have you ever felt bad or Guilty about your drinking or drug use? Yes   Have you ever had a drink or used drugs first thing in the morning to steady your nerves or to get rid of a hangover? (Eye opener) No   CAGE-AID SCORE 1 (A total score of 2 or greater is considered clinically significant)       CAGE-AID reprinted with permission from the [place] Medical Journal, MAXIMO Odonnell. and YON Mayorga, \"Conjoint screening questionnaires for alcohol and drug abuse\" [place] Medical Journal 94: 135-140, 1995.      PATIENT HEALTH QUESTIONNAIRE-9 (PHQ - 9)        10/2/2023     3:22 PM   PHQ-9 ([employer])   1.  Little interest or pleasure in doing things 1   2.  Feeling down, depressed, or hopeless 1   3.  Trouble falling or staying asleep, or sleeping too much 0   4.  Feeling tired or having little energy 1   5.  Poor appetite or overeating 0   6.  Feeling bad about yourself - or that you are a failure or have let yourself or your family down 0   7.  Trouble concentrating on things, such as reading the newspaper or watching television 0   8.  Moving or speaking so slowly that other people could have noticed. Or the opposite - being so fidgety or restless that you have been moving around a lot more than usual 0   9.  Thoughts that you would be better off dead, or of hurting yourself in some way 0   PHQ-9 Total Score 3   6.  Feeling bad about yourself 0   7.  Trouble concentrating 0   8.  Moving slowly or restless 0   9.  Suicidal or self-harm thoughts 0   1.  Little interest or pleasure in doing things Several days   2.  Feeling down, depressed, or hopeless Several days   3.  Trouble falling or staying asleep, " or sleeping too much Not at all   4.  Feeling tired or having little energy Several days   5.  Poor appetite or overeating Not at all   6.  Feeling bad about yourself Not at all   7.  Trouble concentrating Not at all   8.  Moving slowly or restless Not at all   9.  Suicidal or self-harm thoughts Not at all   PHQ-9 via [facility] TOTAL SCORE-----> 3 (Minimal depression)   Difficulty at work, home, or with people Somewhat difficult       Developed by Poonam Seth, Ana Luis, Jermaine Moncada and colleagues, with an educational ind[place] from [employer] No permission required to reproduce, translate, display or distribute.        Allergies:    No Known Allergies    Medications:    Current Outpatient Medications   Medication Sig Dispense Refill    alendronate (FOSAMAX) 70 MG tablet TAKE 1 TABLET BY MOUTH EVERY 7 DAYS IN THE MORNING AND 30 MINUTES BEFORE FOOD ON AN EMPTY STOMACH AND WITH A FULL GLASS OF WATER Strength: 70 mg 12 tablet 1    ALPRAZolam (XANAX) 0.25 MG tablet Take 1 tablet (0.25 mg) by mouth daily as needed for anxiety 30 tablet 3    aspirin 81 MG EC tablet Take 81 mg by mouth daily      atenolol (TENORMIN) 25 MG tablet TAKE 1 TABLET(25 MG) BY MOUTH DAILY 90 tablet 3    calcium carbonate-vitamin D3 (CALTRATE 600 PLUS D3) 600 mg(1,500mg) -400 unit per tablet [CALCIUM CARBONATE-VITAMIN D3 (CALTRATE 600 PLUS D3) 600 MG(1,500MG) -400 UNIT PER TABLET] Take 1 tablet by mouth daily.      eszopiclone (LUNESTA) 2 MG tablet Take 1 tablet (2 mg) by mouth At Bedtime 90 tablet 3    multivitamin therapeutic tablet [MULTIVITAMIN THERAPEUTIC TABLET] Take 1 tablet by mouth daily.      sildenafil (REVATIO) 20 MG tablet Take 0.5 tablets (10 mg) by mouth as needed (erectile dysfunction) TAKE 1 TABLET BY MOUTH 1 HOUR PRIOR TO SEXUAL INTERCOURSE Strength: 20 mg 30 tablet 1    simvastatin (ZOCOR) 10 MG tablet Take 1 tablet (10 mg) by mouth At Bedtime 90 tablet 3    vilazodone (VIIBRYD) 20 MG TABS tablet Take 1 tablet  (20 mg) by mouth daily 90 tablet 0    vitamin B-12 (CYANOCOBALAMIN) 100 MCG tablet Take 1,000 mcg by mouth daily      naproxen (NAPROSYN) 500 MG tablet Take 1 tablet (500 mg) by mouth 2 times daily as needed for moderate pain Take with meals/food (Patient not taking: Reported on 11/9/2023) 30 tablet 1    rivaroxaban ANTICOAGULANT (XARELTO) 10 MG TABS tablet Take 1 tablet (10 mg) by mouth daily (with dinner) for 45 days (Patient not taking: Reported on 11/9/2023) 45 tablet 0       Problem List:  Patient Active Problem List    Diagnosis Date Noted    [id number] normal metabolizer *1/*1 09/26/2023     Priority: Medium    CYP2D6 normal metabolizer *1/*2 09/26/2023     Priority: Medium    Left inguinal hernia 03/04/2022     Priority: Medium     Added automatically from request for surgery [id number]      Nasal septal deviation 11/19/2021     Priority: Medium     Added automatically from request for surgery [id number]      Nasal turbinate hypertrophy 11/19/2021     Priority: Medium     Added automatically from request for surgery [id number]      Chronic rhinitis 11/19/2021     Priority: Medium     Added automatically from request for surgery [id number]      Primary osteoarthritis of left hip 06/29/2021     Priority: Medium    Age-related osteoporosis without current pathological fracture      Priority: Medium     Created by Conversion  Replacement Utility updated for latest IMO load        Hyperlipidemia LDL goal <100      Priority: Medium     Created by Conversion        Colon polyp 2017 10/15/2017     Priority: Medium     Every 5 years         Seasonal allergies 06/05/2017     Priority: Medium    Insomnia 06/05/2017     Priority: Medium     Difficulty staying asleep         Multiple lipomas 11/17/2016     Priority: Medium    Osteopenia      Priority: Medium     Created by Conversion  Replacement Utility updated for latest IMO load        Anxiety      Priority: Medium     Created by Conversion  Replacement Utility updated for latest  IMO load        Hypertension      Priority: Medium     Created by Conversion  Replacement Utility updated for latest IMO load        Rosacea      Priority: Medium     Created by Conversion        Restless Legs Syndrome      Priority: Medium     Created by Conversion            Past Medical/Surgical History:  Past Medical History:   Diagnosis Date    Anxiety     History of angina     March 2021, work up no treatment needed    HLD (hyperlipidemia)     HTN (hypertension)     Osteoarthritis of left hip 6/5/2017    Osteopenia      Past Surgical History:   Procedure Laterality Date    COLONOSCOPY N/A 10/9/2023    Procedure: COLONOSCOPY, FLEXIBLE, WITH LESION REMOVAL USING SNARE;  Surgeon: Sean Little DO;  Location: [facility]    LAPAROSCOPIC HERNIORRHAPHY INGUINAL Left 4/22/2022    Procedure: HERNIORRHAPHY, INGUINAL, LAPAROSCOPIC;  Surgeon: Bruce Roldan MD;  Location: [facility] OR    ORTHOPEDIC SURGERY  08/07/2017, 07/15/2021    L Hip Rplacement, R Hip replacement    SEPTOPLASTY, TURBINOPLASTY, COMBINED N/A 3/29/2022    Procedure: SEPTOPLASTY, NOSE, WITH TURBINOPLASTY Ballon assisted with cryoabation of posterior nasal tissue;  Surgeon: Flavio Odonnell MD;  Location: [facility] OR    TOTAL HIP ARTHROPLASTY Left 08/2017    [facility]        Social History:  Social History     Socioeconomic History    Marital status:      Spouse name: Not on file    Number of children: 2    Years of education: Not on file    Highest education level: Not on file   Occupational History    Not on file   Tobacco Use    Smoking status: Never    Smokeless tobacco: Former     Quit date: 6/1/1987   Vaping Use    Vaping Use: Never used   Substance and Sexual Activity    Alcohol use: Yes     Comment: 3-7 beers a week    Drug use: No    Sexual activity: Yes     Partners: Female     Birth control/protection: None   Other Topics Concern    Parent/sibling w/ CABG, MI or angioplasty before 65F 55M? No   Social History  Narrative    His daughters are both teachers -  and art. He has two grandchildren now, a 4 year old girl and 2 year old boy. He has worked for [employer] for 22 years and [employer] for 19 years. 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      Social Determinants of Health     Financial Resource Strain: Low Risk  (10/2/2023)    Financial Resource Strain     Within the past 12 months, have you or your family members you live with been unable to get utilities (heat, electricity) when it was really needed?: No   Food Insecurity: Low Risk  (10/2/2023)    Food Insecurity     Within the past 12 months, did you worry that your food would run out before you got money to buy more?: No     Within the past 12 months, did the food you bought just not last and you didn t have money to get more?: No   Transportation Needs: Low Risk  (10/2/2023)    Transportation Needs     Within the past 12 months, has lack of transportation kept you from medical appointments, getting your medicines, non-medical meetings or appointments, work, or from getting things that you need?: No   Physical Activity: Not on file   Stress: Not on file   Social Connections: Not on file   Interpersonal Safety: Low Risk  (10/2/2023)    Interpersonal Safety     Do you feel physically and emotionally safe where you currently live?: Yes     Within the past 12 months, have you been hit, slapped, kicked or otherwise physically hurt by someone?: No     Within the past 12 months, have you been humiliated or emotionally abused in other ways by your partner or ex-partner?: No   Housing Stability: Low Risk  (10/2/2023)    Housing Stability     Do you have housing? : Yes     Are you worried about losing your housing?: No       Family History:  Family History   Problem Relation Age of Onset    Hypertension Mother     Hyperlipidemia Mother     Hypertension Maternal Aunt     Hypertension Maternal Uncle     Hypertension  Maternal Grandmother     Breast Cancer Maternal Grandmother     Hypertension Maternal Grandfather        Review of Systems:  A complete review of systems reviewed by me is negative with the exeption of what has been mentioned in the history of present illness.  In the last TWO WEEKS have you experienced any of the following symptoms?  Fevers: No  Night Sweats: No  Weight Gain: No  Pain at Night: No  Double Vision: No  Changes in Vision: No  Difficulty Breathing through Nose: Yes  Sore Throat in Morning: No  Dry Mouth in the Morning: Yes  Shortness of Breath Lying Flat: No  Shortness of Breath With Activity: No  Awakening with Shortness of Breath: No  Increased Cough: No  Heart Racing at Night: Yes  Swelling in Feet or Legs: No  Diarrhea at Night: No  Heartburn at Night: No  Urinating More than Once at Night: Yes  Losing Control of Urine at Night: No  Joint Pains at Night: No  Headaches in Morning: Yes  Weakness in Arms or Legs: No  Depressed Mood: Yes  Anxiety: Yes     Physical Examination:  Vitals: /76   Ht 1.829 m (6')   Wt 79.4 kg (175 lb)   BMI 23.73 kg/m    BMI= Body mass index is 23.73 kg/m .       Physical Exam  Vitals and nursing note reviewed.   Constitutional:       General: He is not in acute distress.     Appearance: Normal appearance. He is well-developed, well-groomed and normal weight. He is not ill-appearing, toxic-appearing or diaphoretic.   HENT:      Head: Normocephalic and atraumatic.      Right Ear: External ear normal.      Left Ear: External ear normal.      Nose: Nose normal.      Mouth/Throat:      Mouth: Mucous membranes are moist.      Pharynx: Oropharynx is clear.   Eyes:      Conjunctiva/sclera: Conjunctivae normal.   Pulmonary:      Effort: Pulmonary effort is normal.   Musculoskeletal:      Cervical back: Normal range of motion and neck supple.   Neurological:      General: No focal deficit present.      Mental Status: He is alert and oriented to person, place, and time.  "  Psychiatric:         Mood and Affect: Mood normal.         Behavior: Behavior normal. Behavior is cooperative.         Thought Content: Thought content normal.         Judgment: Judgment normal.           Physical examination is limited by the nature of a virtual visit           Data: All pertinent previous laboratory data reviewed     Recent Labs   Lab Test 10/02/23  1638 01/12/23  1436    140   POTASSIUM 4.1 4.5   CHLORIDE 104 101   CO2 26 27   ANIONGAP 10 12   GLC 99 102*   BUN 17.4 11.9   CR 0.96 0.85   ELEAZAR 9.2 9.8       Recent Labs   Lab Test 10/02/23  1638   WBC 6.8   RBC 4.70   HGB 13.9   HCT 42.6   MCV 91   MCH 29.6   MCHC 32.6   RDW 12.8          Recent Labs   Lab Test 10/02/23  1638   PROTTOTAL 6.5   ALBUMIN 4.1   BILITOTAL 0.5   ALKPHOS 63   AST 20   ALT 20       No results found for: \"TSH\"    No results found for: \"UAMP\", \"UBARB\", \"BENZODIAZEUR\", \"UCANN\", \"UCOC\", \"OPIT\", \"UPCP\"    No results found for: \"IRONSAT\", \"PN81781\", \"JERRY\"    No results found for: \"PH\", \"PHARTERIAL\", \"PO2\", \"TE9PLYDGICL\", \"SAT\", \"PCO2\", \"HCO3\", \"BASEEXCESS\", \"SIMON\", \"BEB\"    @LABRCNTIPR(phv:4,pco2v:4,po2v:4,hco3v:4,yomaira:4,o2per:4)@    Echocardiology: No results found for this or any previous visit (from the past 4320 hour(s)).    Chest x-ray: No results found for this or any previous visit from the past 365 days.      Chest CT: No results found for this or any previous visit from the past 365 days.      PFT: Most Recent [facility] Pulmonary Function Testing    No results found for: \"20001\"  No results found for: \"20002\"  No results found for: \"20003\"  No results found for: \"20015\"  No results found for: \"20016\"  No results found for: \"20027\"  No results found for: \"20028\"  No results found for: \"20029\"  No results found for: \"20079\"  No results found for: \"20080\"  No results found for: \"20081\"  No results found for: \"20335\"  No results found for: \"20105\"  No results found for: \"20053\"  No results found for: \"20054\"  No " "results found for: \"20055\"      Soraya Briones, MARVEL CNP 11/9/2023   Sleep Medicine    This note was written with the assistance of the Dragon voice-dictation technology software. The final document, although reviewed, may contain errors. For corrections, please contact the office.          "

## 2023-11-09 NOTE — PATIENT INSTRUCTIONS
"          MY TREATMENT INFORMATION FOR SLEEP APNEA-  Brennan Vazquez    DOCTOR : MARVEL Mcbride CNP    Am I having a sleep study at a sleep center?  --->Due to normal delays, you will be contacted within 2-4 weeks to schedule    Am I having a home sleep study?  --->Watch the video for the device you are using:    -/drop off device-   https://www.[employer].com/watch?v=yGGFBdELGhk    -Disposable device sent out require phone/computer application-   https://www.[employer].com/watch?v=BCce_vbiwxE      Frequently asked questions:  1. What is Obstructive Sleep Apnea (GERALD)? GERALD is the most common type of sleep apnea. Apnea means, \"without breath.\"  Apnea is most often caused by narrowing or collapse of the upper airway as muscles relax during sleep.   Almost everyone has occasional apneas. Most people with sleep apnea have had brief interruptions at night frequently for many years.  The severity of sleep apnea is related to how frequent and severe the events are.   2. What are the consequences of GERALD? Symptoms include: feeling sleepy during the day, snoring loudly, gasping or stopping of breathing, trouble sleeping, and occasionally morning headaches or heartburn at night.  Sleepiness can be serious and even increase the risk of falling asleep while driving. Other health consequences may include development of high blood pressure and other cardiovascular disease in persons who are susceptible. Untreated GERALD  can contribute to heart disease, stroke and diabetes.   3. What are the treatment options? In most situations, sleep apnea is a lifelong disease that must be managed with daily therapy. Medications are not effective for sleep apnea and surgery is generally not considered until other therapies have been tried. Your treatment is your choice . Continuous Positive Airway (CPAP) works right away and is the therapy that is effective in nearly everyone. An oral device to hold your jaw forward is usually the next " most reliable option. Other options include postioning devices (to keep you off your back), weight loss, and surgery including a tongue pacing device. There is more detail about some of these options below.  4. Are my sleep studies covered by insurance? Although we will request verification of coverage, we advise you also check in advance of the study to ensure there is coverage.    Important tips for those choosing CPAP and similar devices  For new devices, sign up for device EMI to monitor your device for your followup visits  We encourage you to utilize the [employer] emi or website (myAir web (resmed.com) ) to monitor your therapy progress and share the data with your healthcare team when you discuss your sleep apnea.                                                    Know your equipment:  CPAP is continuous positive airway pressure that prevents obstructive sleep apnea by keeping the throat from collapsing while you are sleeping. In most cases, the device is  smart  and can slowly self-adjusts if your throat collapses and keeps a record every day of how well you are treated-this information is available to you and your care team.  BPAP is bilevel positive airway pressure that keeps your throat open and also assists each breath with a pressure boost to maintain adequate breathing.  Special kinds of BPAP are used in patients who have inadequate breathing from lung or heart disease. In most cases, the device is  smart  and can slowly self-adjusts to assist breathing. Like CPAP, the device keeps a record of how well you are treated.  Your mask is your connection to the device. You get to choose what feels most comfortable and the staff will help to make sure if fits. Here: are some examples of the different masks that are available:       Key points to remember on your journey with sleep apnea:  Sleep study.  PAP devices often need to be adjusted during a sleep study to show that they are effective and  adjusted right.  Good tips to remember: Try wearing just the mask during a quiet time during the day so your body adapts to wearing it. A humidifier is recommended for comfort in most cases to prevent drying of your nose and throat. Allergy medication from your provider may help you if you are having nasal congestion.  Getting settled-in. It takes more than one night for most of us to get used to wearing a mask. Try wearing just the mask during a quiet time during the day so your body adapts to wearing it. A humidifier is recommended for comfort in most cases. Our team will work with you carefully on the first day and will be in contact within 4 days and again at 2 and 4 weeks for advice and remote device adjustments. Your therapy is evaluated by the device each day.   Use it every night. The more you are able to sleep naturally for 7-8 hours, the more likely you will have good sleep and to prevent health risks or symptoms from sleep apnea. Even if you use it 4 hours it helps. Occasionally all of us are unable to use a medical therapy, in sleep apnea, it is not dangerous to miss one night.   Communicate. Call our skilled team on the number provided on the first day if your visit for problems that make it difficult to wear the device. Over 2 out of 3 patients can learn to wear the device long-term with help from our team. Remember to call our team or your sleep providers if you are unable to wear the device as we may have other solutions for those who cannot adapt to mask CPAP therapy. It is recommended that you sleep your sleep provider within the first 3 months and yearly after that if you are not having problems.   Use it for your health. We encourage use of CPAP masks during daytime quiet periods to allow your face and brain to adapt to the sensation of CPAP so that it will be a more natural sensation to awaken to at night or during naps. This can be very useful during the first few weeks or months of adapting to  CPAP though it does not help medically to wear CPAP during wakefulness and  should not be used as a strategy just to meet guidelines.  Take care of your equipment. Make sure you clean your mask and tubing using directions every day and that your filter and mask are replaced as recommended or if they are not working.     BESIDES CPAP, WHAT OTHER THERAPIES ARE THERE?    Positioning Device  Positioning devices are generally used when sleep apnea is mild and only occurs on your back.This example shows a pillow that straps around the waist. It may be appropriate for those whose sleep study shows milder sleep apnea that occurs primarily when lying flat on one's back. Preliminary studies have shown benefit but effectiveness at home may need to be verified by a home sleep test. These devices are generally not covered by medical insurance.  Examples of devices that maintain sleeping on the back to prevent snoring and mild sleep apnea.    Belt type body positioner  http://[employer]/    Electronic reminder  http://nightshifttherapy.[employer]/            Oral Appliance  What is oral appliance therapy?  An oral appliance device fits on your teeth at night like a retainer used after having braces. The device is made by a specialized dentist and requires several visits over 1-2 months before a manufactured device is made to fit your teeth and is adjusted to prevent your sleep apnea. Once an oral device is working properly, snoring should be improved. A home sleep test may be recommended at that time if to determine whether the sleep apnea is adequately treated.       Some things to remember:  -Oral devices are often, but not always, covered by your medical insurance. Be sure to check with your insurance provider.   -If you are referred for oral therapy, you will be given a list of specialized dentists to consider or you may choose to visit the Web site of the American Academy of Dental Sleep Medicine  -Oral devices are less likely to  work if you have severe sleep apnea or are extremely overweight.     More detailed information  An oral appliance is a small acrylic device that fits over the upper and lower teeth  (similar to a retainer or a mouth guard). This device slightly moves jaw forward, which moves the base of the tongue forward, opens the airway, improves breathing for effective treat snoring and obstructive sleep apnea in perhaps 7 out of 10 people .  The best working devices are custom-made by a dental device  after a mold is made of the teeth 1, 2, 3.  When is an oral appliance indicated?  Oral appliance therapy is recommended as a first-line treatment for patients with primary snoring, mild sleep apnea, and for patients with moderate sleep apnea who prefer appliance therapy to use of CPAP4, 5. Severity of sleep apnea is determined by sleep testing and is based on the number of respiratory events per hour of sleep.   How successful is oral appliance therapy?  The success rate of oral appliance therapy in patients with mild sleep apnea is 75-80% while in patients with moderate sleep apnea it is 50-70%. The chance of success in patients with severe sleep apnea is 40-50%. The research also shows that oral appliances have a beneficial effect on the cardiovascular health of GERALD patients at the same magnitude as CPAP therapy7.  Oral appliances should be a second-line treatment in cases of severe sleep apnea, but if not completely successful then a combination therapy utilizing CPAP plus oral appliance therapy may be effective. Oral appliances tend to be effective in a broad range of patients although studies show that the patients who have the highest success are females, younger patients, those with milder disease, and less severe obesity. 3, 6.   Finding a dentist that practices dental sleep medicine  Specific training is available through the American Academy of Dental Sleep Medicine for dentists interested in working in the  field of sleep. To find a dentist who is educated in the field of sleep and the use of oral appliances, near you, visit the Web site of the American Academy of Dental Sleep Medicine.    References  1. Anne et al. Objectively measured vs self-reported compliance during oral appliance therapy for sleep-disordered breathing. Chest 2013; 144(5): 7363-2426.  2. Chelsea, et al. Objective measurement of compliance during oral appliance therapy for sleep-disordered breathing. Thorax 2013; 68(1): 91-96.  3. René et al. Mandibular advancement devices in 620 men and women with GERALD and snoring: tolerability and predictors of treatment success. Chest 2004; 125: 0126-8686.  4. Virgilio et al. Oral appliances for snoring and GERALD: a review. Sleep 2006; 29: 244-262.  5. Erasto et al. Oral appliance treatment for GERALD: an update. J Clin Sleep Med 2014; 10(2): 215-227.  6. Mack et al. Predictors of OSAH treatment outcome. J Dent Res 2007; 86: 8975-8995.      Weight Loss:    Weight loss is a long-term strategy that may improve sleep apnea in some patients.    Weight management is a personal decision and the decision should be based on your interest and the potential benefits.  If you are interested in exploring weight loss strategies, the following discussion covers the impact on weight loss on sleep apnea and the approaches that may be successful.    Being overweight does not necessarily mean you will have health consequences.  Those who have BMI over 35 or over 27 with existing medical conditions carries greater risk.   Weight loss decreases severity of sleep apnea in most people with obesity. For those with mild obesity who have developed snoring with weight gain, even 15-30 pound weight loss can improve and occasionally eliminate sleep apnea.  Structured and life-long dietary and health habits are necessary to lose weight and keep healthier weight levels.     Though there may be significant health benefits  from weight loss, long-term weight loss is very difficult to achieve- studies show success with dietary management in less than 10% of people. In addition, substantial weight loss may require years of dietary control and may be difficult if patients have severe obesity. In these cases, surgical management may be considered.  Finally, older individuals who have tolerated obesity without health complications may be less likely to benefit from weight loss strategies.      [unfilled]    Surgery:    Surgery for obstructive sleep apnea is considered generally only when other therapies fail to work. Surgery may be discussed with you if you are having a difficult time tolerating CPAP and or when there is an abnormal structure that requires surgical correction.  Nose and throat surgeries often enlarge the airway to prevent collapse.  Most of these surgeries create pain for 1-2 weeks and up to half of the most common surgeries are not effective throughout life.  You should carefully discuss the benefits and drawbacks to surgery with your sleep provider and surgeon to determine if it is the best solution for you.   More information  Surgery for GERALD is directed at areas that are responsible for narrowing or complete obstruction of the airway during sleep.  There are a wide range of procedures available to enlarge and/or stabilize the airway to prevent blockage of breathing in the three major areas where it can occur: the palate, tongue, and nasal regions.  Successful surgical treatment depends on the accurate identification of the factors responsible for obstructive sleep apnea in each person.  A personalized approach is required because there is no single treatment that works well for everyone.  Because of anatomic variation, consultation with an examination by a sleep surgeon is a critical first step in determining what surgical options are best for each patient.  In some cases, examination during sedation may be recommended  in order to guide the selection of procedures.  Patients will be counseled about risks and benefits as well as the typical recovery course after surgery. Surgery is typically not a cure for a person s GERALD.  However, surgery will often significantly improve one s GERALD severity (termed  success rate ).  Even in the absence of a cure, surgery will decrease the cardiovascular risk associated with OSA7; improve overall quality of life8 (sleepiness, functionality, sleep quality, etc).      Palate Procedures:  Patients with GERALD often have narrowing of their airway in the region of their tonsils and uvula.  The goals of palate procedures are to widen the airway in this region as well as to help the tissues resist collapse.  Modern palate procedure techniques focus on tissue conservation and soft tissue rearrangement, rather than tissue removal.  Often the uvula is preserved in this procedure. Residual sleep apnea is common in patient after pharyngoplasty with an average reduction in sleep apnea events of 33%2.      Tongue Procedures:  ExamWhile patients are awake, the muscles that surround the throat are active and keep this region open for breathing. These muscles relax during sleep, allowing the tongue and other structures to collapse and block breathing.  There are several different tongue procedures available.  Selection of a tongue base procedure depends on characteristics seen on physical exam.  Generally, procedures are aimed at removing bulky tissues in this area or preventing the back of the tongue from falling back during sleep.  Success rates for tongue surgery range from 50-62%3.    Hypoglossal Nerve Stimulation:  Hypoglossal nerve stimulation has recently received approval from the [place] Food and Drug Administration for the treatment of obstructive sleep apnea.  This is based on research showing that the system was safe and effective in treating sleep apnea6.  Results showed that the median AHI score  decreased 68%, from 29.3 to 9.0. This therapy uses an implant system that senses breathing patterns and delivers mild stimulation to airway muscles, which keeps the airway open during sleep.  The system consists of three fully implanted components: a small generator (similar in size to a pacemaker), a breathing sensor, and a stimulation lead.  Using a small handheld remote, a patient turns the therapy on before bed and off upon awakening.    Candidates for this device must be greater than 18 years of age, have moderate to severe GERALD (AHI between 15-65), BMI less than 35, have tried CPAP/oral appliance for at least 8 weeks without success, and have appropriate upper airway anatomy (determined by a sleep endoscopy performed by Dr. Matt Dasilva).    Hypoglossal Nerve Stimulation Pathway:    The sleep surgeon s office will work with the patient through the insurance prior-authorization process (including communications and appeals).    Nasal Procedures:  Nasal obstruction can interfere with nasal breathing during the day and night.  Studies have shown that relief of nasal obstruction can improve the ability of some patients to tolerate positive airway pressure therapy for obstructive sleep apnea1.  Treatment options include medications such as nasal saline, topical corticosteroid and antihistamine sprays, and oral medications such as antihistamines or decongestants. Non-surgical treatments can include external nasal dilators for selected patients. If these are not successful by themselves, surgery can improve the nasal airway either alone or in combination with these other options.      Combination Procedures:  Combination of surgical procedures and other treatments may be recommended, particularly if patients have more than one area of narrowing or persistent positional disease.  The success rate of combination surgery ranges from 66-80%2,3.    References  Ivania TALLEY. The Role of the Nose in Snoring and Obstructive  Sleep Apnoea: An Update.  Eur Arch Otorhinolaryngol. 2011; 268: 1365-73.   Peyton SM; Chantelle JA; Ena JR; Pallanch JF; Sal MB; João SG; Federico MENDOZA. Surgical modifications of the upper airway for obstructive sleep apnea in adults: a systematic review and meta-analysis. SLEEP 2010;33(10):5141-4829. Rico SMITH. Hypopharyngeal surgery in obstructive sleep apnea: an evidence-based medicine review.  Arch Otolaryngol Head Neck Surg. 2006 Feb;132(2):206-13.  Enrique YH1, Alon Y, Danyel IVÁN. The efficacy of anatomically based multilevel surgery for obstructive sleep apnea. Otolaryngol Head Neck Surg. 2003 Oct;129(4):327-35.  Kezirian E, Goldberg A. Hypopharyngeal Surgery in Obstructive Sleep Apnea: An Evidence-Based Medicine Review. Arch Otolaryngol Head Neck Surg. 2006 Feb;132(2):206-13.  Joe AARON et al. Upper-Airway Stimulation for Obstructive Sleep Apnea.  N Engl J Med. 2014 Jan 9;370(2):139-49.  Peker Y et al. Increased Incidence of Cardiovascular Disease in Middle-aged Men with Obstructive Sleep Apnea. Am J Respir Crit Care Med; 2002 166: 159-165  Saba EM et al. Studying Life Effects and Effectiveness of Palatopharyngoplasty (SLEEP) study: Subjective Outcomes of Isolated Uvulopalatopharyngoplasty. Otolaryngol Head Neck Surg. 2011; 144: 623-631.        WHAT IF I ONLY HAVE SNORING?    Mandibular advancement devices, lateral sleep positioning, long-term weight loss and treatment of nasal allergies have been shown to improve snoring.  Exercising tongue muscles with a game (https://apps.[employer]/us/emi/soundly-reduce-snoring/ut1062158494) or stimulating the tongue during the day with a device (https://doi.org/10.3390/fgo16732329) have improved snoring in some individuals.    Remember to Drive Safe... Drive Alive     Sleep health profoundly affects your health, mood, and your safety.  Thirty three percent of the population (one in three of us) is not getting enough sleep and many have a sleep disorder. Not getting  enough sleep or having an untreated / undertreated sleep condition may make us sleepy without even knowing it. In fact, our driving could be dramatically impaired due to our sleep health. As your provider, here are some things I would like you to know about driving:     Here are some warning signs for impairment and dangerous drowsy driving:              -Having been awake more than 16 hours               -Looking tired               -Eyelid drooping              -Head nodding (it could be too late at this point)              -Driving for more than 30 minutes     Some things you could do to make the driving safer if you are experiencing some drowsiness:              -Stop driving and rest              -Call for transportation              -Make sure your sleep disorder is adequately treated     Some things that have been shown NOT to work when experiencing drowsiness while driving:              -Turning on the radio              -Opening windows              -Eating any  distracting  /  entertaining  foods (e.g., sunflower seeds, candy, or any other)              -Talking on the phone      Your decision may not only impact your life, but also the life of others. Please, remember to drive safe for yourself and all of us.

## 2023-11-09 NOTE — PROGRESS NOTES
Virtual Visit Details  2:00 PM-2:35 PM  Type of service:  Video Visit     Originating Location (pt. Location): Home    Distant Location (provider location):  Off-site  Platform used for Video Visit: Gina

## 2023-11-09 NOTE — NURSING NOTE
Has patient had flu shot for current/most recent flu season? If so, when? Yes: 10/2/23      Is the patient currently in the state of [place]? YES    Visit mode:VIDEO    If the visit is dropped, the patient can be reconnected by: VIDEO VISIT: Text to cell phone:   Telephone Information:   Mobile [phone]       Will anyone else be joining the visit? NO  (If patient encounters technical issues they should call [phone]    How would you like to obtain your AVS? MyChart    Are changes needed to the allergy or medication list? No    Reason for visit: Consult    Katrina JOSEPH

## 2023-11-16 ENCOUNTER — OFFICE VISIT (OUTPATIENT)
Dept: VASCULAR SURGERY | Facility: CLINIC | Age: 58
End: 2023-11-16
Attending: SURGERY
Payer: COMMERCIAL

## 2023-11-16 ENCOUNTER — ANCILLARY PROCEDURE (OUTPATIENT)
Dept: ULTRASOUND IMAGING | Facility: CLINIC | Age: 58
End: 2023-11-16
Attending: SURGERY
Payer: COMMERCIAL

## 2023-11-16 ENCOUNTER — TELEPHONE (OUTPATIENT)
Dept: OTHER | Facility: CLINIC | Age: 58
End: 2023-11-16

## 2023-11-16 DIAGNOSIS — I80.01 THROMBOPHLEBITIS OF SUPERFICIAL VEINS OF RIGHT LOWER EXTREMITY: Primary | ICD-10-CM

## 2023-11-16 DIAGNOSIS — I80.01 SUPERFICIAL THROMBOPHLEBITIS OF RIGHT LEG: ICD-10-CM

## 2023-11-16 PROCEDURE — 99203 OFFICE O/P NEW LOW 30 MIN: CPT | Performed by: SURGERY

## 2023-11-16 PROCEDURE — 93971 EXTREMITY STUDY: CPT | Mod: RT | Performed by: SURGERY

## 2023-11-16 NOTE — NURSING NOTE
Patient Reported symptoms:    Right leg   Heaviness None of the time   Achiness A little of the time   Swelling None of the time   Throbbing None of the time   Itching None of the time   Appearance Slightly noticeable   Impact on work/activities Symptoms but full able to participate

## 2023-11-16 NOTE — TELEPHONE ENCOUNTER
----- Message from Diogenes Hodgson MD sent at 11/16/2023 11:40 AM CST -----  Regarding: work up  Dr. Manuel,   Requesting your consultation for a thrombophilia work up.  Donte       Please arrange for new patient consult with Dr. Manuel at next available.      Appt Note:  Ref by Dr. Hodgson for thrombophilia workup.

## 2023-11-16 NOTE — LETTER
11/16/2023         RE: Brennan Vazquez  [address] [address]  [address]  [place] [address]        Dear Colleague,    Thank you for referring your patient, Brennan Vazquez, to the [facility] [place]. Please see a copy of my visit note below.    I had the pleasure of seeing Mr. Brennan Vazquez in the vein clinic today.  This a kind referral from Dr. Walker.  Patient is a 58-year-old otherwise healthy male who developed an episode of superficial thrombophlebitis in a varicose vein in the right lower extremity.  This happened sometime at the end of September and early part of October of this year.  Patient underwent sonography which showed superficial thrombophlebitis involving varicose veins in the right and mid distal thigh.    He does not recall any inciting event.  He was not immobilized.  He had not traveled.  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  He does not think he has had a previous deep venous thrombosis or has a history of thrombophilia.    On examination I feel the superficial right thigh and distal thigh varicose veins which is now occluded.    Imaging data shows that the great saphenous vein is competent from the saphenofemoral junction to the distal thigh.  The varicose vein branch in question is arising from the great saphenous vein in the mid thigh.  There is segmental incompetence of the great saphenous vein below the knee.  There is also not occlusive thrombus in the small saphenous vein.    I am not fully convinced that there is a possible structural reason for his episode of thrombophlebitis.  It would be worthwhile to get a thrombophilia work-up.  Of note patient did not take Xarelto because of the heavy co-pay.  I requested Dr. Manuel to evaluate the patient for thrombophilia work-up.  No need for surgical intervention.      Again, thank you for allowing me to participate  in the care of your patient.        Sincerely,        Diogenes Hodgson MD

## 2023-11-16 NOTE — PROGRESS NOTES
I had the pleasure of seeing Mr. Brennan Vazquez in the vein clinic today.  This a kind referral from Dr. Walker.  Patient is a 58-year-old otherwise healthy male who developed an episode of superficial thrombophlebitis in a varicose vein in the right lower extremity.  This happened sometime at the end of September and early part of October of this year.  Patient underwent sonography which showed superficial thrombophlebitis involving varicose veins in the right and mid distal thigh.    He does not recall any inciting event.  He was not immobilized.  He had not traveled.  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  He does not think he has had a previous deep venous thrombosis or has a history of thrombophilia.    On examination I feel the superficial right thigh and distal thigh varicose veins which is now occluded.    Imaging data shows that the great saphenous vein is competent from the saphenofemoral junction to the distal thigh.  The varicose vein branch in question is arising from the great saphenous vein in the mid thigh.  There is segmental incompetence of the great saphenous vein below the knee.  There is also not occlusive thrombus in the small saphenous vein.    I am not fully convinced that there is a possible structural reason for his episode of thrombophlebitis.  It would be worthwhile to get a thrombophilia work-up.  Of note patient did not take Xarelto because of the heavy co-pay.  I requested Dr. Manuel to evaluate the patient for thrombophilia work-up.  No need for surgical intervention.

## 2023-11-22 ASSESSMENT — SLEEP AND FATIGUE QUESTIONNAIRES
HOW LIKELY ARE YOU TO NOD OFF OR FALL ASLEEP WHILE SITTING QUIETLY AFTER LUNCH WITHOUT ALCOHOL: SLIGHT CHANCE OF DOZING
HOW LIKELY ARE YOU TO NOD OFF OR FALL ASLEEP IN A CAR, WHILE STOPPED FOR A FEW MINUTES IN TRAFFIC: WOULD NEVER DOZE
HOW LIKELY ARE YOU TO NOD OFF OR FALL ASLEEP WHILE SITTING AND TALKING TO SOMEONE: WOULD NEVER DOZE
HOW LIKELY ARE YOU TO NOD OFF OR FALL ASLEEP WHEN YOU ARE A PASSENGER IN A CAR FOR AN HOUR WITHOUT A BREAK: SLIGHT CHANCE OF DOZING
HOW LIKELY ARE YOU TO NOD OFF OR FALL ASLEEP WHILE LYING DOWN TO REST IN THE AFTERNOON WHEN CIRCUMSTANCES PERMIT: MODERATE CHANCE OF DOZING
HOW LIKELY ARE YOU TO NOD OFF OR FALL ASLEEP WHILE SITTING AND READING: SLIGHT CHANCE OF DOZING
HOW LIKELY ARE YOU TO NOD OFF OR FALL ASLEEP WHILE WATCHING TV: SLIGHT CHANCE OF DOZING
HOW LIKELY ARE YOU TO NOD OFF OR FALL ASLEEP WHILE SITTING INACTIVE IN A PUBLIC PLACE: SLIGHT CHANCE OF DOZING

## 2023-11-27 ENCOUNTER — OFFICE VISIT (OUTPATIENT)
Dept: SLEEP MEDICINE | Facility: CLINIC | Age: 58
End: 2023-11-27
Attending: NURSE PRACTITIONER
Payer: COMMERCIAL

## 2023-11-27 DIAGNOSIS — G47.30 SLEEP-RELATED BREATHING DISORDER: ICD-10-CM

## 2023-11-27 PROCEDURE — G0399 HOME SLEEP TEST/TYPE 3 PORTA: HCPCS | Performed by: INTERNAL MEDICINE

## 2023-11-27 NOTE — PROGRESS NOTES
Pt is completing a home sleep test. Pt was instructed on how to put on the Noxturnal T3 device and associated equipment before going to bed and given the opportunity to practice putting it on before leaving the sleep center. Pt was reminded to bring the home sleep test kit back to the center tomorrow, at agreed upon time for download and reporting.   Neck circumference: 39.5 CM / 15.5 inches.  Pratima Ayala CMA, HST Specialist  [facility] [facility]

## 2023-11-28 ENCOUNTER — DOCUMENTATION ONLY (OUTPATIENT)
Dept: SLEEP MEDICINE | Facility: CLINIC | Age: 58
End: 2023-11-28
Payer: COMMERCIAL

## 2023-11-28 ENCOUNTER — MYC MEDICAL ADVICE (OUTPATIENT)
Dept: SLEEP MEDICINE | Facility: CLINIC | Age: 58
End: 2023-11-28

## 2023-11-29 ENCOUNTER — OFFICE VISIT (OUTPATIENT)
Dept: OTHER | Facility: CLINIC | Age: 58
End: 2023-11-29
Attending: INTERNAL MEDICINE
Payer: COMMERCIAL

## 2023-11-29 VITALS
HEART RATE: 65 BPM | WEIGHT: 177.4 LBS | HEIGHT: 72 IN | BODY MASS INDEX: 24.03 KG/M2 | OXYGEN SATURATION: 99 % | DIASTOLIC BLOOD PRESSURE: 81 MMHG | SYSTOLIC BLOOD PRESSURE: 126 MMHG

## 2023-11-29 DIAGNOSIS — I80.01 THROMBOPHLEBITIS OF SUPERFICIAL VEINS OF RIGHT LOWER EXTREMITY: ICD-10-CM

## 2023-11-29 PROCEDURE — 99205 OFFICE O/P NEW HI 60 MIN: CPT | Performed by: INTERNAL MEDICINE

## 2023-11-29 PROCEDURE — 99213 OFFICE O/P EST LOW 20 MIN: CPT | Performed by: INTERNAL MEDICINE

## 2023-11-29 NOTE — PROGRESS NOTES
INITIAL VASCULAR MEDICAL ASSESSMENT  REFERRAL SOURCE: Dr. 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 He did not take Xarelto as prescribed due to costs, and had sxs progress. This correlated with anatomical progression of SVT such that it has now begin to involve the thigh and calf of the RLE. The anatomical extent of thrombus exceeds 5 cm in size. He has not worn compression hosiery.  The patient presents today to address the above. He has children and would like to know if he has a thrombophilia for their sake more so than his own. He does not smoke. There is not a FHx of VTE. This is his first lifetime VTE, no h/o DVT/PE. He works as a short . No antecedent long driving, flying, or immobilization.     Review Of Systems  Skin: negative  Eyes: negative  Ears/Nose/Throat: negative  Respiratory: No shortness of breath, dyspnea on exertion, cough, or hemoptysis  Cardiovascular: negative  Gastrointestinal: negative  Genitourinary: negative  Musculoskeletal: positive for palpable cords in the LRE.  Neurologic: negative  Psychiatric: negative  Hematologic/Lymphatic/Immunologic: negative  Endocrine: negative      PAST MEDICAL HISTORY:                  Past Medical History:   Diagnosis Date    Anxiety     History of angina     March 2021, work up no treatment needed    HLD (hyperlipidemia)     HTN (hypertension)     Osteoarthritis of left hip 6/5/2017    Osteopenia        PAST SURGICAL HISTORY:                  Past Surgical History:   Procedure Laterality Date    COLONOSCOPY N/A 10/9/2023    Procedure: COLONOSCOPY, FLEXIBLE, WITH LESION REMOVAL USING SNARE;  Surgeon: Sean Little DO;  Location: [facility]    LAPAROSCOPIC HERNIORRHAPHY INGUINAL Left 4/22/2022    Procedure: HERNIORRHAPHY, INGUINAL, LAPAROSCOPIC;  Surgeon: Bruce Roldan MD;  Location: [facility]  OR    ORTHOPEDIC SURGERY  08/07/2017, 07/15/2021    L Hip Rplacement, R Hip replacement    SEPTOPLASTY, TURBINOPLASTY, COMBINED N/A 3/29/2022    Procedure: SEPTOPLASTY, NOSE, WITH TURBINOPLASTY [place] assisted with cryoabation of posterior nasal tissue;  Surgeon: Flavio Odonnell MD;  Location: [facility]    TOTAL HIP ARTHROPLASTY Left 08/2017    [facility]        CURRENT MEDICATIONS:                  Current Outpatient Medications   Medication Sig Dispense Refill    alendronate (FOSAMAX) 70 MG tablet TAKE 1 TABLET BY MOUTH EVERY 7 DAYS IN THE MORNING AND 30 MINUTES BEFORE FOOD ON AN EMPTY STOMACH AND WITH A FULL GLASS OF WATER Strength: 70 mg 12 tablet 1    ALPRAZolam (XANAX) 0.25 MG tablet Take 1 tablet (0.25 mg) by mouth daily as needed for anxiety 30 tablet 3    aspirin 81 MG EC tablet Take 81 mg by mouth daily      atenolol (TENORMIN) 25 MG tablet TAKE 1 TABLET(25 MG) BY MOUTH DAILY 90 tablet 3    calcium carbonate-vitamin D3 (CALTRATE 600 PLUS D3) 600 mg(1,500mg) -400 unit per tablet [CALCIUM CARBONATE-VITAMIN D3 (CALTRATE 600 PLUS D3) 600 MG(1,500MG) -400 UNIT PER TABLET] Take 1 tablet by mouth daily.      eszopiclone (LUNESTA) 2 MG tablet Take 1 tablet (2 mg) by mouth At Bedtime 90 tablet 3    multivitamin therapeutic tablet [MULTIVITAMIN THERAPEUTIC TABLET] Take 1 tablet by mouth daily.      naproxen (NAPROSYN) 500 MG tablet Take 1 tablet (500 mg) by mouth 2 times daily as needed for moderate pain Take with meals/food 30 tablet 1    sildenafil (REVATIO) 20 MG tablet Take 0.5 tablets (10 mg) by mouth as needed (erectile dysfunction) TAKE 1 TABLET BY MOUTH 1 HOUR PRIOR TO SEXUAL INTERCOURSE Strength: 20 mg 30 tablet 1    simvastatin (ZOCOR) 10 MG tablet Take 1 tablet (10 mg) by mouth At Bedtime 90 tablet 3    vilazodone (VIIBRYD) 20 MG TABS tablet Take 1 tablet (20 mg) by mouth daily 90 tablet 0    vitamin B-12 (CYANOCOBALAMIN) 100 MCG tablet Take 1,000 mcg by mouth daily      zolpidem (AMBIEN) 10 MG  tablet Take tablet by mouth 15 minutes prior to sleep, for Sleep Study 2 tablet 0    rivaroxaban ANTICOAGULANT (XARELTO) 10 MG TABS tablet Take 1 tablet (10 mg) by mouth daily (with dinner) for 45 days (Patient not taking: Reported on 11/16/2023) 45 tablet 0       ALLERGIES:                No Known Allergies    SOCIAL HISTORY:                  Social History     Socioeconomic History    Marital status:      Spouse name: Not on file    Number of children: 2    Years of education: Not on file    Highest education level: Not on file   Occupational History    Not on file   Tobacco Use    Smoking status: Never    Smokeless tobacco: Former     Quit date: 6/1/1987   Vaping Use    Vaping Use: Never used   Substance and Sexual Activity    Alcohol use: Yes     Comment: 3-7 beers a week    Drug use: No    Sexual activity: Yes     Partners: Female     Birth control/protection: None   Other Topics Concern    Parent/sibling w/ CABG, MI or angioplasty before 65F 55M? No   Social History Narrative    His daughters are both teachers -  and art. He has two grandchildren now, a 4 year old girl and 2 year old boy. He has worked for [employer] for 22 years and [employer] for 19 years. 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      Social Determinants of Health     Financial Resource Strain: Low Risk  (10/2/2023)    Financial Resource Strain     Within the past 12 months, have you or your family members you live with been unable to get utilities (heat, electricity) when it was really needed?: No   Food Insecurity: Low Risk  (10/2/2023)    Food Insecurity     Within the past 12 months, did you worry that your food would run out before you got money to buy more?: No     Within the past 12 months, did the food you bought just not last and you didn t have money to get more?: No   Transportation Needs: Low Risk  (10/2/2023)    Transportation Needs     Within the past 12 months, has  lack of transportation kept you from medical appointments, getting your medicines, non-medical meetings or appointments, work, or from getting things that you need?: No   Physical Activity: Not on file   Stress: Not on file   Social Connections: Not on file   Interpersonal Safety: Low Risk  (10/2/2023)    Interpersonal Safety     Do you feel physically and emotionally safe where you currently live?: Yes     Within the past 12 months, have you been hit, slapped, kicked or otherwise physically hurt by someone?: No     Within the past 12 months, have you been humiliated or emotionally abused in other ways by your partner or ex-partner?: No   Housing Stability: Low Risk  (10/2/2023)    Housing Stability     Do you have housing? : Yes     Are you worried about losing your housing?: No       FAMILY HISTORY:                   Family History   Problem Relation Age of Onset    Hypertension Mother     Hyperlipidemia Mother     Hypertension Maternal Aunt     Hypertension Maternal Uncle     Hypertension Maternal Grandmother     Breast Cancer Maternal Grandmother     Hypertension Maternal Grandfather          Physical exam Reveals:    O/P: WNL  HEENT: WNL  NECK: No JVD, thyromegaly, or lymphadenopathy  HEART: RRR, no murmurs, gallops, or rubs  LUNGS: CTA bilaterally without rales, wheezes, or rhonchi  GI: NABS, nondistended, nontender, soft  EXT:without cyanosis, clubbing, or edema  NEURO: nonfocal  : no flank tenderness            Name: Brennan Vazquez    Patient ID: [id number]   Date: 2023     : 1965   Sex: male      Examined by: NATHALIE Spaulding RVT   Age:  58 year old     Reading MD: KATHERYN Hodgson MD     INDICATION: Right lower extremity superficial thrombophlebitis     EXAM TYPE   RIGHT LOWER EXTREMITY VENOUS DUPLEX FOR VENOUS INSUFFICIENCY   TECHNICAL SUMMARY     A duplex ultrasound study using color flow was performed, to evaluate the right lower extremity veins for valvular incompetence with the patient  in a steep reversed trendelenberg.     RIGHT:     The deep veins demonstrate phasic flow, compress and respond to augmentations.  There is no DVT.  The common femoral vein is incompetent and free of thrombus. The remaining deep veins are competent and free of thrombus.     The GSV demonstrates phasic flow, compresses and responds to augmentations from the saphenofemoral junction to the ankle with no evidence of thrombus. The great saphenous vein measures 5.4 mm at the saphenofemoral junction, 4.6 mm at the proximal thigh, and 2.3 mm at the knee. The GSV is incompetent at the Knee  and the Proximal Calf, with the greatest reflux time of 1782 milliseconds.  The GSV is continuous from the SFJ to the knee where it joins a thrombosed superficial branch.  Acute occlusive thrombus is visualized in a superficial branch of the GSV.  The branch courses down the medial thigh into the calf, and is occluded from mid thigh to the knee, non-occlusive in the high calf.     The AASV is not visualized.     Acute occlusive thrombus is seen in the Giacomini vein at the distal thigh.  The Giacomini vein is not visualized in the mid thigh.     Chronic occlusive (distal calf) and non-occlusive (proximal to mid calf) thrombus is visualized in the SSV.  The saphenopopliteal junction is competent ( 3.7 mm).     Perforators: There is no evidence of incompetent  veins at any level.     LEFT:     The CFV demonstrate phasic flow, compress and respond to augmentations.  There is no DVT.      FINAL SUMMARY:   1.  No evidence of deep venous thrombosis in the right lower extremity.  2.  Right common femoral vein incompetence.  3.  Segmental right great saphenous vein incompetence.  4.  Occlusive thrombus in the left great saphenous vein superficial branch.  5.  Occlusive thrombus in the Giacomini vein in the distal thigh.  6.  Occlusive and nonocclusive thrombus in the small saphenous vein.     Incompetence Criteria: Greater than 500  milliseconds reflux in the superficial and  veins and greater than 1000 milliseconds reflux in the deep veins.      ANDERS BARRY M.D., FACS, RPVI         EXAM: US LOWER EXTREMITY VENOUS DUPLEX RIGHT  LOCATION: [facility]  DATE: 10/2/2023     INDICATION:  Cramps of right lower extremity  COMPARISON: None.  TECHNIQUE: Venous Duplex ultrasound of the right lower extremity with and without compression, augmentation and duplex. Color flow and spectral Doppler with waveform analysis performed.     FINDINGS: Exam includes the common femoral, femoral, popliteal, and contralateral common femoral veins as well as segmentally visualized deep calf veins and greater saphenous vein.      RIGHT: No deep vein thrombosis. Nonocclusive thrombus within the varicose vein at the right mid thigh. No thrombus within a varicose vein in the distal right thigh. No popliteal cyst.                                                                      IMPRESSION:  1.  No deep venous thrombosis in the right lower extremity.  2.  Superficial venous thrombosis involving varicose veins within the right mid and distal thigh.       A/P:    (I80.01) Thrombophlebitis of superficial veins of right lower extremity  Comment: I informed him that it is not regularly recommended to undertake a thrombophilia eval after a first VTE, and an SVT at that. He nonetheless has children and would like to know for their sake. Given anatomical extent of progression off of AC, he must be ACd, and with DVT treatment doses and for at least three months. He should wear thigh high compression.   Plan: Check US Venous Competency Bilateral, D dimer         Quantitative in three months, RTC one week later. Consider cessation of AC at that time.  Rivaroxaban ANTICOAGULANT 15 & 20 MG TBPK Starter Therapy Pack, rivaroxaban ANTICOAGULANT        (XARELTO) 20 MG TABS tablet, Check Antithrombin III,         Beta 2 Glycoprotein 1 Antibody IgG,  Beta 2         Glycoprotein 1 Antibody IgM, Cardiolipin Dalia         IgG and IgM, Factor 2 and 5 mutation analysis,         Lupus Anticoagulant Panel, Protein C         chromogenic, Protein S Antigen Free, immunofixation now, RTC two weeks later.              65 minutes total medical care for this first visit with myself and with Vascular Medicine.

## 2023-11-29 NOTE — PROGRESS NOTES
Patient is here to discuss consult    /81 (BP Location: Right arm, Patient Position: Chair, Cuff Size: Adult Regular)   Pulse 64   Ht 6' (1.829 m)   Wt 177 lb 6.4 oz (80.5 kg)   SpO2 99%   BMI 24.06 kg/m      Questions patient would like addressed today are: N/A.    Refills are needed: No    Has homecare services and agency name:  Melanie ROSENTHAL

## 2023-11-29 NOTE — PROGRESS NOTES
This HSAT was performed using a Noxturnal T3 device which recorded snore, sound, movement activity, body position, nasal pressure, oronasal thermal airflow, pulse, oximetry and both chest and abdominal respiratory effort. HSAT data was restricted to the time patient states they were in bed.     HSAT was scored using 1B 4% hypopnea rule.     HST AHI (Non-PAT): 12.6  Snoring was reported as moderate.  Time with SpO2 below 89% was 13.2 minutes.   Overall signal quality was good     Pt will follow up with sleep provider to determine appropriate therapy.

## 2023-11-29 NOTE — PROGRESS NOTES
HST POST-STUDY QUESTIONNAIRE    What time did you go to bed?  22:15  How long do you think it took to fall asleep?  15 min  What time did you wake up to start the day?  08:05  Did you get up during the night at all?  yes  If you woke up, do you remember approximately what time(s)? 02:30  Did you have any difficulty with the equipment?  No  Did you us any type of treatment with this study?  Other Lunesta  Was the head of the bed elevated? No  Did you sleep in a recliner?  No  Did you stop using CPAP at least 3 days before this test?  NA  Any other information you'd like us to know?

## 2023-11-30 ENCOUNTER — LAB (OUTPATIENT)
Dept: LAB | Facility: CLINIC | Age: 58
End: 2023-11-30
Payer: COMMERCIAL

## 2023-11-30 DIAGNOSIS — I80.01 THROMBOPHLEBITIS OF SUPERFICIAL VEINS OF RIGHT LOWER EXTREMITY: ICD-10-CM

## 2023-11-30 LAB
FACTOR 2 INTERPRETATION: ABNORMAL
FACTOR V INTERPRETATION: ABNORMAL
LAB DIRECTOR COMMENTS: ABNORMAL
LAB DIRECTOR DISCLAIMER: ABNORMAL
LAB DIRECTOR INTERPRETATION: ABNORMAL
LAB DIRECTOR METHODOLOGY: ABNORMAL
LAB DIRECTOR RESULTS: ABNORMAL
SPECIMEN DESCRIPTION: ABNORMAL

## 2023-11-30 PROCEDURE — 86146 BETA-2 GLYCOPROTEIN ANTIBODY: CPT

## 2023-11-30 PROCEDURE — 86334 IMMUNOFIX E-PHORESIS SERUM: CPT | Performed by: PATHOLOGY

## 2023-11-30 PROCEDURE — 36415 COLL VENOUS BLD VENIPUNCTURE: CPT

## 2023-11-30 PROCEDURE — 85306 CLOT INHIBIT PROT S FREE: CPT

## 2023-11-30 PROCEDURE — 85300 ANTITHROMBIN III ACTIVITY: CPT

## 2023-11-30 PROCEDURE — 85390 FIBRINOLYSINS SCREEN I&R: CPT | Performed by: PATHOLOGY

## 2023-11-30 PROCEDURE — 86147 CARDIOLIPIN ANTIBODY EA IG: CPT

## 2023-11-30 PROCEDURE — 85303 CLOT INHIBIT PROT C ACTIVITY: CPT

## 2023-11-30 PROCEDURE — G0452 MOLECULAR PATHOLOGY INTERPR: HCPCS | Performed by: PATHOLOGY

## 2023-11-30 PROCEDURE — 81241 F5 GENE: CPT

## 2023-11-30 PROCEDURE — 85730 THROMBOPLASTIN TIME PARTIAL: CPT

## 2023-11-30 PROCEDURE — 81240 F2 GENE: CPT

## 2023-11-30 PROCEDURE — 85613 RUSSELL VIPER VENOM DILUTED: CPT

## 2023-12-01 LAB
AT III ACT/NOR PPP CHRO: 101 % (ref 85–135)
DRVVT SCREEN RATIO: 0.75
INR PPP: 0.97 (ref 0.85–1.15)
LA PPP-IMP: NEGATIVE
LUPUS INTERPRETATION: NORMAL
PROT PATTERN SERPL IFE-IMP: NORMAL
PTT RATIO: 0.94
THROMBIN TIME: 16.4 SECONDS (ref 13–19)

## 2023-12-01 NOTE — PROCEDURES
HOME SLEEP STUDY INTERPRETATION    Patient: Brennan Vazquez  MRN: [id number]  YOB: 1965  Study Date: 11/27/2023  Referring Provider: Veronica Anne DO  Ordering Provider: Soraya GRIER CNP     Indications for Home Study: Brennan Vazquez is a 58 year old male who presents with symptoms suggestive of obstructive sleep apnea.    Estimated body mass index is 24.06 kg/m  as calculated from the following:    Height as of 11/29/23: 1.829 m (6').    Weight as of 11/29/23: 80.5 kg (177 lb 6.4 oz).  Total score - [place]: 7 (11/22/2023  4:06 PM)  Total Score: 6 (11/22/2023  4:08 PM)    Data: A full night home sleep study was performed recording the standard physiologic parameters including body position, movement, sound, nasal pressure, thermal oral airflow, chest and abdominal movements with respiratory inductance plethysmography, and oxygen saturation by pulse oximetry. Pulse rate was estimated by oximetry recording. This study was considered adequate based on > 4 hours of quality oximetry and respiratory recording. As specified by the AASM Manual for the Scoring of Sleep and Associated events, version 2.3, Rule VIII.D 1B, 4% oxygen desaturation scoring for hypopneas is used as a standard of care on all home sleep apnea testing.    Analysis Time:  587.4 minutes    Respiration:   Sleep Associated Hypoxemia: sustained hypoxemia was present. Baseline oxygen saturation was 94%.  Time with saturation less than or equal to 88% was 9.7 minutes. The lowest oxygen saturation was 80%.   Snoring: Snoring was present.  Respiratory events: The home study revealed a presence of 63 obstructive apneas and 15 mixed and central apneas. There were 44 hypopneas resulting in a combined apnea/hypopnea index [AHI] of 12.6 events per hour.  AHI was 14.7 per hour supine, 0 per hour prone, 3.8 per hour on left side, and 15.9 per hour on right side.   Pattern: Excluding events noted above, respiratory rate and pattern was  Normal.    Position: Percent of time spent: supine - 60.5%, prone - 0%, on left - 21.4%, on right - 17.3%.    Heart Rate: By pulse oximetry normal rate was noted.     Assessment:   Mild obstructive sleep apnea.  Sleep associated hypoxemia was present.    Recommendations:  Consider auto-CPAP at 5-15 cmH2O, oral appliance therapy, or positional therapy.  Suggest optimizing sleep hygiene and avoiding sleep deprivation.  Weight management.    Diagnosis Code(s): Obstructive Sleep Apnea G47.33, Hypoxemia G47.36    Robb Feliz DO, December 1, 2023   Diplomate, American Board of Internal Medicine, Sleep Medicine

## 2023-12-04 LAB
B2 GLYCOPROT1 IGG SERPL IA-ACNC: 1.8 U/ML
B2 GLYCOPROT1 IGM SERPL IA-ACNC: 2.6 U/ML
CARDIOLIPIN IGG SER IA-ACNC: 2.1 GPL-U/ML
CARDIOLIPIN IGG SER IA-ACNC: NEGATIVE
CARDIOLIPIN IGM SER IA-ACNC: 2.9 MPL-U/ML
CARDIOLIPIN IGM SER IA-ACNC: NEGATIVE
PROT C ACT/NOR PPP CHRO: 109 % (ref 70–170)
PROT S FREE AG ACT/NOR PPP IA: 104 % (ref 70–148)

## 2023-12-06 ENCOUNTER — VIRTUAL VISIT (OUTPATIENT)
Dept: BEHAVIORAL HEALTH | Facility: CLINIC | Age: 58
End: 2023-12-06
Payer: COMMERCIAL

## 2023-12-06 ENCOUNTER — VIRTUAL VISIT (OUTPATIENT)
Dept: PSYCHIATRY | Facility: CLINIC | Age: 58
End: 2023-12-06
Payer: COMMERCIAL

## 2023-12-06 DIAGNOSIS — G47.00 INSOMNIA, UNSPECIFIED TYPE: ICD-10-CM

## 2023-12-06 DIAGNOSIS — F33.0 MILD EPISODE OF RECURRENT MAJOR DEPRESSIVE DISORDER (H): Primary | ICD-10-CM

## 2023-12-06 DIAGNOSIS — F51.01 PRIMARY INSOMNIA: Primary | ICD-10-CM

## 2023-12-06 DIAGNOSIS — F43.9 TRAUMA AND STRESSOR-RELATED DISORDER: ICD-10-CM

## 2023-12-06 DIAGNOSIS — F41.1 GENERALIZED ANXIETY DISORDER: ICD-10-CM

## 2023-12-06 PROCEDURE — 90832 PSYTX W PT 30 MINUTES: CPT | Mod: 95 | Performed by: COUNSELOR

## 2023-12-06 PROCEDURE — 99214 OFFICE O/P EST MOD 30 MIN: CPT | Mod: VID | Performed by: PSYCHIATRY & NEUROLOGY

## 2023-12-06 ASSESSMENT — ANXIETY QUESTIONNAIRES
2. NOT BEING ABLE TO STOP OR CONTROL WORRYING: SEVERAL DAYS
GAD7 TOTAL SCORE: 5
4. TROUBLE RELAXING: SEVERAL DAYS
7. FEELING AFRAID AS IF SOMETHING AWFUL MIGHT HAPPEN: SEVERAL DAYS
IF YOU CHECKED OFF ANY PROBLEMS ON THIS QUESTIONNAIRE, HOW DIFFICULT HAVE THESE PROBLEMS MADE IT FOR YOU TO DO YOUR WORK, TAKE CARE OF THINGS AT HOME, OR GET ALONG WITH OTHER PEOPLE: SOMEWHAT DIFFICULT
6. BECOMING EASILY ANNOYED OR IRRITABLE: NOT AT ALL
1. FEELING NERVOUS, ANXIOUS, OR ON EDGE: SEVERAL DAYS
GAD7 TOTAL SCORE: 5
3. WORRYING TOO MUCH ABOUT DIFFERENT THINGS: SEVERAL DAYS
5. BEING SO RESTLESS THAT IT IS HARD TO SIT STILL: NOT AT ALL

## 2023-12-06 ASSESSMENT — PATIENT HEALTH QUESTIONNAIRE - PHQ9
SUM OF ALL RESPONSES TO PHQ QUESTIONS 1-9: 5
10. IF YOU CHECKED OFF ANY PROBLEMS, HOW DIFFICULT HAVE THESE PROBLEMS MADE IT FOR YOU TO DO YOUR WORK, TAKE CARE OF THINGS AT HOME, OR GET ALONG WITH OTHER PEOPLE: SOMEWHAT DIFFICULT
SUM OF ALL RESPONSES TO PHQ QUESTIONS 1-9: 5

## 2023-12-06 ASSESSMENT — PAIN SCALES - GENERAL: PAINLEVEL: NO PAIN (0)

## 2023-12-06 NOTE — NURSING NOTE
Is the patient currently in the state of [place]? YES    Visit mode:VIDEO    If the visit is dropped, the patient can be reconnected by: VIDEO VISIT: Send to e-mail at: [email or web address]    Will anyone else be joining the visit? NO  (If patient encounters technical issues they should call [phone]    How would you like to obtain your AVS? MyChart    Are changes needed to the allergy or medication list? Pt stated no changes to allergies and Pt stated no med changes    Reason for visit: SANTOSH JOSEPH

## 2023-12-06 NOTE — PATIENT INSTRUCTIONS
CBT-I  Waketime - 5:30am  Most important to be up and out of bed  Sets the biological sleep clock   Bedtime 10:30PM   Avoid any sleep before bedtime or after waketime unless an emergency  Stimulus Control  -our brain can get mixed up with what bed is for. the brain can associate the bed with alertness, thinking, anxiety, etc.  Sometimes we need to re train the brain that bed is for sleeping. Here's how to do it:  -if waking up in the middle of the night and think you cannot fall asleep, recommend leaving the bed, relaxing, and returning to bed when you feel like you can fall asleep quickly  -We set a recommended bedtime; however, if you are wide-awake, angry, anxious or thinking a lot we recommend waiting until you feel like you could fall asleep quickly then going to bed.    Patient Education   Collaborative Care Psychiatry Service  What to Expect  Here's what to expect from your Collaborative Care Psychiatry Service (CCPS).   About CCPS  CCPS means 2 people work together to help you get better. You'll meet with a behavioral health clinician and a psychiatric doctor. A behavioral health clinician helps people with mental health problems by talking with them. A psychiatric doctor helps people by giving them medicine.  How it works  At every visit, you'll see the behavioral health clinician (BHC) first. They'll talk with you about how you're doing and teach you how to feel better.   Then you'll see the psychiatric doctor. This doctor can help you deal with troubling thoughts and feelings by giving you medicine. They'll make sure you know the plan for your care.   CCPS usually takes 3 to 6 visits. If you need more visits, we may have you start seeing a different psychiatric doctor for ongoing care.  If you have any questions or concerns, we'll be glad to talk with you.  About visits  Be open  At your visits, please talk openly about your problems. It may feel hard, but it's the best way for us to help you.  Cancelling  "visits  If you can't come to your visit, please call us right away at [phone]. If you don't cancel at least 24 hours (1 full day) before your visit, that's \"late cancellation.\"  Being late to visits  Being very late is the same as not showing up. You will be a \"no show\" if:  Your appointment starts with a [facility], and you're more than 15 minutes late for a 30-minute (half hour) visit. This will also cancel your appointment with the psychiatric doctor.  Your appointment is with a psychiatric doctor only, and you're more than 15 minutes late for a 30-minute (half hour) visit.  Your appointment is with a psychiatric doctor only, and you're more than 30 minutes late for a 60-minute (full hour) visit.  If you cancel late or don't show up 2 times within 6 months, we may end your care.   Getting help between visits  If you need help between visits, you can call us Monday to Friday from 8 a.m. to 4:30 p.m. at [phone].  Emergency care  Call 911 or go to the nearest emergency department if your life or someone else's life is in danger.  Call 988 anytime to reach the national Suicide and Crisis hotline.  Medicine refills  To refill your medicine, call your pharmacy. You can also call [facility]'s Behavioral Access at [phone], Monday to Friday, 8 a.m. to 4:30 p.m. It can take 1 to 3 business days to get a refill.   Forms, letters, and tests  You may have papers to fill out, like FMLA, short-term disability, and workability. We can help you with these forms at your visits, but you must have an appointment. You may need more than 1 visit for this, to be in an intensive therapy program, or both.  Before we can give you medicine for ADHD, we may refer you to get tested for it or confirm it another way.  We may not be able to give you an emotional support animal letter.  We don't do mental health checks ordered by the court.   We don't do mental health testing, but we can refer you to get tested.   Thank you " for choosing us for your care.  For informational purposes only. Not to replace the advice of your health care provider. Copyright   2022 [facility]. All rights reserved. [employer] [id number] - 12/22.

## 2023-12-06 NOTE — PROGRESS NOTES
Virtual Visit Details    Type of service:  Video Visit     Originating Location (pt. Location): Home    Distant Location (provider location):  On-site  Platform used for Video Visit: [facility] Psychiatry Consult Note    IDENTIFICATION   Name: Brennan Vazquez   : 1965/58 year old      Sex:    @ male          Telemedicine Visit: The patient's condition can be safely assessed and treated via synchronous audio and visual telemedicine encounter.        Face to Face/patient Contact total time: 19 minutes  Pre Charting time: 2 minutes; Post charting time, communication and other activities: 0 minutes; Total time 21 minutes  2:15 PM -2:34 PM          SUBJECTIVE   Fatigue is the challenge. Has been that way for awhile. Seems it's related to sleep. If he can get 5-6 hours straight he feels like a different person. Maybe 4-5 hours total last night, split.     Otherwise vilazodone is efficacious. Used to be very down on cloudy days and now tolerates better. Tolerable sexual dysfunction, managed with sildafenil.    Looks forward to going to bed, with lunesta falls asleep easily - within 10-15 minutes. Wakes up at 530am on work day; non work days stays in bed until 10pm.       The following assessments were completed by patient for this visit:          2023     4:45 PM 10/2/2023     3:22 PM 2023    12:44 PM   PHQ   PHQ-9 Total Score 8 3 5   Q9: Thoughts of better off dead/self-harm past 2 weeks Not at all Not at all Not at all          1/10/2023     6:08 PM 2023     8:32 PM 2023    12:44 PM   EBOYN-7 SCORE   Total Score 3 (minimal anxiety) 8 (mild anxiety) 5 (mild anxiety)   Total Score 3 8 5        OBJECTIVE     Vital Signs:   There were no vitals taken for this visit.    Labs:  na    Current Medications:  Current Outpatient Medications   Medication    alendronate (FOSAMAX) 70 MG tablet    ALPRAZolam (XANAX) 0.25 MG tablet    aspirin 81 MG EC tablet    atenolol (TENORMIN) 25 MG tablet     calcium carbonate-vitamin D3 (CALTRATE 600 PLUS D3) 600 mg(1,500mg) -400 unit per tablet    eszopiclone (LUNESTA) 2 MG tablet    multivitamin therapeutic tablet    naproxen (NAPROSYN) 500 MG tablet    rivaroxaban ANTICOAGULANT (XARELTO) 20 MG TABS tablet    Rivaroxaban ANTICOAGULANT 15 & 20 MG TBPK Starter Therapy Pack    sildenafil (REVATIO) 20 MG tablet    simvastatin (ZOCOR) 10 MG tablet    vilazodone (VIIBRYD) 20 MG TABS tablet    vitamin B-12 (CYANOCOBALAMIN) 100 MCG tablet    zolpidem (AMBIEN) 10 MG tablet     No current facility-administered medications for this visit.        The [place] Prescription Monitoring Program has been reviewed and there are no concerns about diversionary activity for controlled substances at this time.        ADDED HISTORY   Did not tolerate xarelto - had a rash. Sees provider on the 14th or tomorrow.     MENTAL STATUS EXAMINATION:   Appearance: Good attention to grooming and hygiene  Attitude: Cooperative  Eye Contact: Good  Gait and Station: Sitting  Psychomotor Behavior: Within normal limits  Oriented to: Grossly person place and time  Attention Span and Concentration: Grossly intact  Speech: Depressed tone  Language: English  Mood:  sad   Affect: Mildly constricted  Associations:  no loose associations  Thought Process:  logical, linear and goal oriented  Thought Content: No evidence of delusions or suicidal or homicidal ideation plan or intent  Memory: Grossly intact  Fund of Knowledge: Good  Insight: Good   Judgment:  intact, adequate for safety  Impulse Control:  intact        DIAGNOSES:   Major depressive disorder, mild, single episode, in partial remission  Unspecified anxiety disorder  Insomnia disorder      ASSESSMENT:   Undergoing depression symptoms in context of previous challenges as a  and FDC.  May have had some degree of proneness to anxiety slightly.  Has not tolerated medications thus far, pursue Viibryd and Pharmacogenomic testing.  Switch  Lunesta to trazodone.    Today Brennan Vazquez reports no suicidal ideation. In addition, he has notable risk factors for self-harm, including anxiety. However, risk is mitigated by commitment to family, absence of past attempts, and history of seeking help when needed. Therefore, based on all available evidence including the factors cited above, he does not appear to be at imminent risk for self-harm, does not meet criteria for a 72-hr hold, and therefore remains appropriate for ongoing outpatient level of care.       PLAN:     Patient advised of consultative model. Patient will continue to be seen for ongoing consultation and stabilization.  Does not meet criteria for involuntary treatment or hospitalization  Trial of Viibryd 7/31/23 10 mg daily mild to moderate efficacy, reduced libido/sexual dysfunction, some remnant challenges with energy and motivation => 10/25/23 20 mg po qday efficacy, tolerable sexual dysfunction, viagra works -Risks, benefits and alternatives discussed.  Patient provides verbal consent to treatment.   Continue Lunesta 2 mg nightly-Risks, benefits and alternatives discussed.  Patient provides verbal consent to treatment.  Advised of recommendation against long-term use, memory  Alprazolam 0.25 mg p.o. daily as needed anxiety  Backup - duloxetine 30 mg po qday  Discontinue trazodone (nasal conguestion)  Labs -reviewed  CBT-I  Waketime - 5:30am  Most important to be up and out of bed  Sets the biological sleep clock   Bedtime 10:30PM   Avoid any sleep before bedtime or after waketime unless an emergency  Stimulus Control  -our brain can get mixed up with what bed is for. the brain can associate the bed with alertness, thinking, anxiety, etc.  Sometimes we need to re train the brain that bed is for sleeping. Here's how to do it:  -if waking up in the middle of the night and think you cannot fall asleep, recommend leaving the bed, relaxing, and returning to bed when you feel like you can fall  asleep quickly  -We set a recommended bedtime; however, if you are wide-awake, angry, anxious or thinking a lot we recommend waiting until you feel like you could fall asleep quickly then going to bed.    sleep, behavioral activation with Elle FORBES  Return in 4-6 weeks    Administrative Billing:   Time spent with patient was greater than 50% of time and/or significant time was spent in counseling and coordination of care regarding above diagnoses and treatment plan. Pre charting time and post charting time/documentation/coordination are done on date of service.      Signed:   Austin Jackson M.D.  [facility] Psychiatry Service    Disclaimer: This note consists of symbols derived from keyboarding, dictation and/or voice recognition software. As a result, there may be errors in the script that have gone undetected. Please consider this when interpreting information found in this chart.

## 2023-12-06 NOTE — PROGRESS NOTES
[facility] Collaborative Care Psychiatry Services [facility]  2023      Behavioral Health Clinician Progress Note    Patient Name: Brennan Vazquez           Service Type:  Individual      Service Location:   MyChart / Email (patient reached)     Session Start Time: 129pm  Session End Time: 204pm      Session Length: 16 - 37      Attendees: Patient     Service Modality:  Video Visit:      Provider verified identity through the following two step process.  Patient provided:  Patient  and Patient was verified at admission/transfer    Telemedicine Visit: The patient's condition can be safely assessed and treated via synchronous audio and visual telemedicine encounter.      Reason for Telemedicine Visit: Services only offered telehealth    Originating Site (Patient Location): Patient's home    Distant Site (Provider Location): [facility] MENTAL [facility] & ADDICTION [facility]    Consent:  The patient/guardian has verbally consented to: the potential risks and benefits of telemedicine (video visit) versus in person care; bill my insurance or make self-payment for services provided; and responsibility for payment of non-covered services.     Patient would like the video invitation sent by:  My Chart    Mode of Communication:  Video Conference via [facility]    Distant Location (Provider):  On-site    As the provider I attest to compliance with applicable laws and regulations related to telemedicine.    Visit Activities (Refresh list every visit): [facility] Only    Diagnostic Assessment Date: 2023 Austin Jackson M.D.   Treatment Plan Review Date: 3/6/2023  See Flowsheets for today's PHQ-9 and EBONY-7 results  Previous PHQ-9:       2023     4:45 PM 10/2/2023     3:22 PM 2023    12:44 PM   PHQ-9 SCORE   PHQ-9 Total Score [facility] 8 (Mild depression) 3 (Minimal depression) 5 (Mild depression)   PHQ-9 Total Score 8 3 5     Previous EBONY-7:       1/10/2023     6:08 PM 2023     8:32 PM  "12/6/2023    12:44 PM   EBONY-7 SCORE   Total Score 3 (minimal anxiety) 8 (mild anxiety) 5 (mild anxiety)   Total Score 3 8 5     GAD2:       7/30/2023     4:56 PM 9/7/2023     5:56 PM 10/22/2023     9:40 AM 12/6/2023    12:47 PM   EBONY-2   Feeling nervous, anxious, or on edge 1 1 1    1 1   Not being able to stop or control worrying 1 1 1    1 1   EBONY-2 Total Score 2 2 2    2 2       DATA  Extended Session (60+ minutes): No  Interactive Complexity: No  Crisis: No  [facility] Patient: No    Treatment Objective(s) Addressed in This Session:  Target Behavior(s):   reduce worry and staying present and improve sleep hygiene    Depressed Mood: Increase interest, engagement, and pleasure in doing things  Decrease frequency and intensity of feeling down, depressed, hopeless  Improve quantity and quality of night time sleep / decrease daytime naps  Anxiety: will experience a reduction in anxiety, will develop more effective coping skills to manage anxiety symptoms, and will increase ability to function adaptively  PTSD Symptom Management  Psychological distress related to Sleep Disturbance    Current Stressors / Issues:  Medication Questions/Requests: no, feeling even from when talking to him last     update: Pt did the sleep study. Worry and depression is able the same. Pt has been walking outside since it is nice out and he has a total gym. Work is busy and going well. Being outside a lot when the sun is shining helps them feel different. He reports that it is fatigue that is impacting him to be able to do things. Pt's aunt passed away in a car accident. Close friend was dx with ALS and he passed away. Other people passed away 59 or 50s.   Stressors: worried about the future  Side Effects:   Mood: \"pretty good\" moment here and there will ticked off at work  Appetite: unremarkable  Sleep: pt started xarelto and maybe had an allergic reaction which affected their sleep, besides this they are moving in the right direction, he has " been using sleepy time tea, not use phone before bed, in bed between 9-10pm and will try to get 8 hours    Impulsive spending/spending sprees: no    Caffeine: nothing  [facility]  Interventions: therapy light, Mindfulness , send info of mindfulness activities    Progress on Treatment Objective(s) / Homework:  Satisfactory progress - ACTION (Actively working towards change); Intervened by reinforcing change plan / affirming steps taken    CBT: Pt reports that they have a wind down routine before bed. He is working to improve sleep habits and completed the sleep study. Pt is working to still exercise and be outside.     [facility] encourages pt to look into therapy light and Mindfulness  emi to help him stay in the moment. [facility] explains that the goal of mindfulness isn't to clear you mind, but to be more away of thoughts and not let them carry you away with them, but to redirect to things present around you using all 5 senses.     Motivational Interviewing    MI Intervention: Co-Developed Goal: improve sleep and reducing fatigue, Expressed Empathy/Understanding, Supported Autonomy, Collaboration, Evocation, Permission to raise concern or advise, Open-ended questions, Reflections: simple and complex, Change talk (evoked), and Reframe     Change Talk Expressed by the Patient: Need to change Committment to change Taking steps    Provider Response to Change Talk: E - Evoked more info from patient about behavior change, A - Affirmed patient's thoughts, decisions, or attempts at behavior change, R - Reflected patient's change talk, and S - Summarized patient's change talk statements    Also provided psychoeducation about behavioral health condition, symptoms, and treatment options    Assessments completed prior to visit:  The following assessments were completed by patient for this visit:  GAD2:       7/30/2023     4:56 PM 9/7/2023     5:56 PM 10/22/2023     9:40 AM 12/6/2023    12:47 PM   EBONY-2    Feeling nervous, anxious, or on edge 1 1 1    1 1   Not being able to stop or control worrying 1 1 1    1 1   EBONY-2 Total Score 2 2 2    2 2         Care Plan review completed: No    Medication Review:  No changes to current psychiatric medication(s)    Medication Compliance:  Yes    Changes in Health Issues:   None reported    Chemical Use Review:   Substance Use: Chemical use reviewed, no active concerns identified      Tobacco Use: No current tobacco use.      Assessment: Current Emotional / Mental Status (status of significant symptoms):  Risk status (Self / Other harm or suicidal ideation)  Patient denies a history of suicidal ideation, suicide attempts, self-injurious behavior, homicidal ideation, homicidal behavior, and and other safety concerns  Patient denies current fears or concerns for personal safety.  Patient denies current or recent suicidal ideation or behaviors.  Patient denies current or recent homicidal ideation or behaviors.  Patient denies current or recent self injurious behavior or ideation.  Patient denies other safety concerns.  A safety and risk management plan has not been developed at this time, however patient was encouraged to call [place] [phone] should there be a change in any of these risk factors.    Appearance:   Appropriate   Eye Contact:   Good   Psychomotor Behavior: Normal   Attitude:   Cooperative  Interested Friendly  Orientation:   All  Speech   Rate / Production: Normal    Volume:  Normal   Mood:    Anxious   Affect:    Appropriate   Thought Content:  Clear   Thought Form:  Coherent  Logical   Insight:    Good     Diagnoses:  1. Mild episode of recurrent major depressive disorder (H24)    2. Generalized anxiety disorder    3. Trauma and stressor-related disorder          Collateral Reports Completed:  Communicated with: Austin Jackson M.D.     Plan: (Homework, other):  Patient was given information about behavioral services and encouraged to schedule a follow up  appointment with the clinic [facility] in 1 month.  He was also given information about mental health symptoms and treatment options  and ways to track sleep and improve it. CD Recommendations: No indications of CD issues. [facility] will provide pt with information mentioned about mindfulness activities through [employer].       Elle Chen, LICSW    ______________________________________________________________________    Integrated Primary Care Behavioral Health Treatment Plan    Patient's Name: Brennan Vazquez  YOB: 1965    Date of Creation: 12/6/2023  Date Treatment Plan Last Reviewed/Revised: 12/6/2023    DSM5 Diagnoses:   Mild episode of recurrent major depressive disorder (H24)    Generalized anxiety disorder    Trauma and stressor-related disorder      Psychosocial / Contextual Factors: retired as , anxiety as a child, sleep difficulty, works and gets exercise at job  PROMIS (reviewed every 90 days):   PROMIS 10-Global Health (only subscores and total score):       7/30/2023     4:57 PM 10/25/2023    12:57 PM   PROMIS-10 Scores Only   Global Mental Health Score 10 11   Global Physical Health Score 17 15   PROMIS TOTAL - SUBSCORES 27 26   Will be updated next visit.    Referral / Collaboration:  Referral to another professional/service is not indicated at this time.    Anticipated number of session for this episode of care: 5-6  Anticipation frequency of session: Monthly  Anticipated Duration of each session: 16-37 minutes  Treatment plan will be reviewed in 90 days or when goals have been changed.       MeasurableTreatment Goal(s) related to diagnosis / functional impairment(s)  Goal 1: Patient will improve anxiety and sleep                 I will know I've met my goal when I am able to improve my sleep and continue to exercise and get outside       Objective #A (Patient Action)                          Patient will improve sleep hygiene through continuing to exercise and practice positive  sleep hygiene activities.  Status:  New: 11/17/2023     Intervention(s)  Therapist will provide support through MI, Acceptance and Commitment Therapy and, guided imagery, problem solving model to explore and overcome barriers.    Patient has reviewed and agreed to the above plan.      ERIN Parker  December 6, 2023

## 2023-12-07 ENCOUNTER — VIRTUAL VISIT (OUTPATIENT)
Dept: OTHER | Facility: CLINIC | Age: 58
End: 2023-12-07
Attending: INTERNAL MEDICINE
Payer: COMMERCIAL

## 2023-12-07 DIAGNOSIS — I80.01 THROMBOPHLEBITIS OF SUPERFICIAL VEINS OF RIGHT LOWER EXTREMITY: Primary | ICD-10-CM

## 2023-12-07 PROCEDURE — 99214 OFFICE O/P EST MOD 30 MIN: CPT | Mod: VID | Performed by: INTERNAL MEDICINE

## 2023-12-07 NOTE — PROGRESS NOTES
HPI: Brennan Vazquez is a 58 year old male with a h/o anatomically progressive RLE SVT dxd 10/02/23 to involve RLE VV's. He did not take Xarelto as prescribed due to costs, and had sxs progress. This correlated with anatomical progression of SVT such that it has now begin to involve the thigh and calf of the RLE. The anatomical extent of thrombus exceeds 5 cm in size. He has not worn compression hosiery.  The patient presents today to address the above. He has children and wanted to know if he has a thrombophilia for their sake more so than his own. He does not smoke. There is not a FHx of VTE. This is his first lifetime VTE, no h/o DVT/PE. He works as a short . No antecedent long driving, flying, or immobilization.     On 11/29/23, I informed him  that it is not regularly recommended to undertake a thrombophilia eval after a first VTE, and an SVT at that. He nonetheless has children and wanted to know for their sake. We therefore checked a thrombophilia eval to include: Antithrombin III, Beta 2 Glycoprotein 1 Antibody IgG, Beta 2 Glycoprotein 1 Antibody IgM, Cardiolipin Dalia IgG and IgM, Factor 2 and 5 mutation analysis, Lupus Anticoagulant Panel, Protein C chromogenic, Protein S Antigen Free, immunofixation. The results were drawn prior to starting AC and are notable for the fact that he is a Leiden heterozygote. All other results were normal. Given the anatomical extent of progression off of AC, I advised he be ACd, and with DVT treatment doses and for at least three months. He started this on 12/04 /2023 and then sent us a goCatch message on 12/06/23 to state that broke out in an itchy rash on his chest, neck and head on 12/04 and 12/05. Also, his face was very flush and he felt dizzy. He used Benadryl both days and that helped very much but took several hours to go away. He had no airway issues, no tongue swelling.     He presents for a video visit today to discuss his thrombophilia results,  his interval clinical response to two days AC, and to determine what to do about his sxs encountered after taking Xarelto as above.       Review Of Systems  Skin: as above  Eyes: negative  Ears/Nose/Throat: negative  Respiratory: No shortness of breath, dyspnea on exertion, cough, or hemoptysis  Cardiovascular: negative  Gastrointestinal: negative  Genitourinary: negative  Musculoskeletal: positive for palpable cords in the LRE.  Neurologic: negative  Psychiatric: negative  Hematologic/Lymphatic/Immunologic: negative  Endocrine: negative        PAST MEDICAL HISTORY:                  Past Medical History        Past Medical History:   Diagnosis Date    Anxiety      History of angina       March 2021, work up no treatment needed    HLD (hyperlipidemia)      HTN (hypertension)      Osteoarthritis of left hip 6/5/2017    Osteopenia              PAST SURGICAL HISTORY:                  Past Surgical History         Past Surgical History:   Procedure Laterality Date    COLONOSCOPY N/A 10/9/2023     Procedure: COLONOSCOPY, FLEXIBLE, WITH LESION REMOVAL USING SNARE;  Surgeon: Sean Little DO;  Location: [facility]    LAPAROSCOPIC HERNIORRHAPHY INGUINAL Left 4/22/2022     Procedure: HERNIORRHAPHY, INGUINAL, LAPAROSCOPIC;  Surgeon: Bruce Roldan MD;  Location: [facility] OR    ORTHOPEDIC SURGERY   08/07/2017, 07/15/2021     L Hip Rplacement, R Hip replacement    SEPTOPLASTY, TURBINOPLASTY, COMBINED N/A 3/29/2022     Procedure: SEPTOPLASTY, NOSE, WITH TURBINOPLASTY Ballon assisted with cryoabation of posterior nasal tissue;  Surgeon: Flavio Odonnell MD;  Location: [facility] OR    TOTAL HIP ARTHROPLASTY Left 08/2017     [facility]             CURRENT MEDICATIONS:                  Current Outpatient Prescriptions          Current Outpatient Medications   Medication Sig Dispense Refill    alendronate (FOSAMAX) 70 MG tablet TAKE 1 TABLET BY MOUTH EVERY 7 DAYS IN THE MORNING AND 30 MINUTES BEFORE FOOD ON AN  EMPTY STOMACH AND WITH A FULL GLASS OF WATER Strength: 70 mg 12 tablet 1    ALPRAZolam (XANAX) 0.25 MG tablet Take 1 tablet (0.25 mg) by mouth daily as needed for anxiety 30 tablet 3    aspirin 81 MG EC tablet Take 81 mg by mouth daily        atenolol (TENORMIN) 25 MG tablet TAKE 1 TABLET(25 MG) BY MOUTH DAILY 90 tablet 3    calcium carbonate-vitamin D3 (CALTRATE 600 PLUS D3) 600 mg(1,500mg) -400 unit per tablet [CALCIUM CARBONATE-VITAMIN D3 (CALTRATE 600 PLUS D3) 600 MG(1,500MG) -400 UNIT PER TABLET] Take 1 tablet by mouth daily.        eszopiclone (LUNESTA) 2 MG tablet Take 1 tablet (2 mg) by mouth At Bedtime 90 tablet 3    multivitamin therapeutic tablet [MULTIVITAMIN THERAPEUTIC TABLET] Take 1 tablet by mouth daily.        naproxen (NAPROSYN) 500 MG tablet Take 1 tablet (500 mg) by mouth 2 times daily as needed for moderate pain Take with meals/food 30 tablet 1    sildenafil (REVATIO) 20 MG tablet Take 0.5 tablets (10 mg) by mouth as needed (erectile dysfunction) TAKE 1 TABLET BY MOUTH 1 HOUR PRIOR TO SEXUAL INTERCOURSE Strength: 20 mg 30 tablet 1    simvastatin (ZOCOR) 10 MG tablet Take 1 tablet (10 mg) by mouth At Bedtime 90 tablet 3    vilazodone (VIIBRYD) 20 MG TABS tablet Take 1 tablet (20 mg) by mouth daily 90 tablet 0    vitamin B-12 (CYANOCOBALAMIN) 100 MCG tablet Take 1,000 mcg by mouth daily        zolpidem (AMBIEN) 10 MG tablet Take tablet by mouth 15 minutes prior to sleep, for Sleep Study 2 tablet 0    rivaroxaban ANTICOAGULANT (XARELTO) 10 MG TABS tablet Take 1 tablet (10 mg) by mouth daily (with dinner) for 45 days (Patient not taking: Reported on 11/16/2023) 45 tablet 0            ALLERGIES:                No Known Allergies     SOCIAL HISTORY:                  Social History   Social History            Socioeconomic History    Marital status:        Spouse name: Not on file    Number of children: 2    Years of education: Not on file    Highest education level: Not on file   Occupational  History    Not on file   Tobacco Use    Smoking status: Never    Smokeless tobacco: Former       Quit date: 6/1/1987   Vaping Use    Vaping Use: Never used   Substance and Sexual Activity    Alcohol use: Yes       Comment: 3-7 beers a week    Drug use: No    Sexual activity: Yes       Partners: Female       Birth control/protection: None   Other Topics Concern    Parent/sibling w/ CABG, MI or angioplasty before 65F 55M? No   Social History Narrative     His daughters are both teachers -  and art. He has two grandchildren now, a 4 year old girl and 2 year old boy. He has worked for [employer] for 22 years and [employer] for 19 years. 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       Social Determinants of Health           Financial Resource Strain: Low Risk  (10/2/2023)     Financial Resource Strain      Within the past 12 months, have you or your family members you live with been unable to get utilities (heat, electricity) when it was really needed?: No   Food Insecurity: Low Risk  (10/2/2023)     Food Insecurity      Within the past 12 months, did you worry that your food would run out before you got money to buy more?: No      Within the past 12 months, did the food you bought just not last and you didn t have money to get more?: No   Transportation Needs: Low Risk  (10/2/2023)     Transportation Needs      Within the past 12 months, has lack of transportation kept you from medical appointments, getting your medicines, non-medical meetings or appointments, work, or from getting things that you need?: No   Physical Activity: Not on file   Stress: Not on file   Social Connections: Not on file   Interpersonal Safety: Low Risk  (10/2/2023)     Interpersonal Safety      Do you feel physically and emotionally safe where you currently live?: Yes      Within the past 12 months, have you been hit, slapped, kicked or otherwise physically hurt by someone?: No      Within the  past 12 months, have you been humiliated or emotionally abused in other ways by your partner or ex-partner?: No   Housing Stability: Low Risk  (10/2/2023)     Housing Stability      Do you have housing? : Yes      Are you worried about losing your housing?: No            FAMILY HISTORY:                   Family History         Family History   Problem Relation Age of Onset    Hypertension Mother      Hyperlipidemia Mother      Hypertension Maternal Aunt      Hypertension Maternal Uncle      Hypertension Maternal Grandmother      Breast Cancer Maternal Grandmother      Hypertension Maternal Grandfather                 Physical exam Reveals:     O/P: WNL  HEENT: WNL  NECK: No JVD, thyromegaly, or lymphadenopathy  HEART: RRR, no murmurs, gallops, or rubs  LUNGS: CTA bilaterally without rales, wheezes, or rhonchi  GI: NABS, nondistended, nontender, soft  EXT:without cyanosis, clubbing, or edema  NEURO: nonfocal  : no flank tenderness                 Name: Brennan Vazquez    Patient ID: [id number]   Date: 2023     : 1965   Sex: male      Examined by: NATHALIE Spaulding RVT   Age:  58 year old     Reading MD: KATHERYN Hodgson MD     INDICATION: Right lower extremity superficial thrombophlebitis     EXAM TYPE   RIGHT LOWER EXTREMITY VENOUS DUPLEX FOR VENOUS INSUFFICIENCY   TECHNICAL SUMMARY     A duplex ultrasound study using color flow was performed, to evaluate the right lower extremity veins for valvular incompetence with the patient in a steep reversed trendelenberg.     RIGHT:     The deep veins demonstrate phasic flow, compress and respond to augmentations.  There is no DVT.  The common femoral vein is incompetent and free of thrombus. The remaining deep veins are competent and free of thrombus.     The GSV demonstrates phasic flow, compresses and responds to augmentations from the saphenofemoral junction to the ankle with no evidence of thrombus. The great saphenous vein measures 5.4 mm at the  saphenofemoral junction, 4.6 mm at the proximal thigh, and 2.3 mm at the knee. The GSV is incompetent at the Knee  and the Proximal Calf, with the greatest reflux time of 1782 milliseconds.  The GSV is continuous from the SFJ to the knee where it joins a thrombosed superficial branch.  Acute occlusive thrombus is visualized in a superficial branch of the GSV.  The branch courses down the medial thigh into the calf, and is occluded from mid thigh to the knee, non-occlusive in the high calf.     The AASV is not visualized.     Acute occlusive thrombus is seen in the Giacomini vein at the distal thigh.  The Giacomini vein is not visualized in the mid thigh.     Chronic occlusive (distal calf) and non-occlusive (proximal to mid calf) thrombus is visualized in the SSV.  The saphenopopliteal junction is competent ( 3.7 mm).     Perforators: There is no evidence of incompetent  veins at any level.     LEFT:     The CFV demonstrate phasic flow, compress and respond to augmentations.  There is no DVT.      FINAL SUMMARY:   1.  No evidence of deep venous thrombosis in the right lower extremity.  2.  Right common femoral vein incompetence.  3.  Segmental right great saphenous vein incompetence.  4.  Occlusive thrombus in the left great saphenous vein superficial branch.  5.  Occlusive thrombus in the Giacomini vein in the distal thigh.  6.  Occlusive and nonocclusive thrombus in the small saphenous vein.     Incompetence Criteria: Greater than 500 milliseconds reflux in the superficial and  veins and greater than 1000 milliseconds reflux in the deep veins.      ANDERS BARRY M.D., FACS, RPVI            EXAM: US LOWER EXTREMITY VENOUS DUPLEX RIGHT  LOCATION: [facility]  DATE: 10/2/2023     INDICATION:  Cramps of right lower extremity  COMPARISON: None.  TECHNIQUE: Venous Duplex ultrasound of the right lower extremity with and without compression, augmentation and duplex. Color  flow and spectral Doppler with waveform analysis performed.     FINDINGS: Exam includes the common femoral, femoral, popliteal, and contralateral common femoral veins as well as segmentally visualized deep calf veins and greater saphenous vein.      RIGHT: No deep vein thrombosis. Nonocclusive thrombus within the varicose vein at the right mid thigh. No thrombus within a varicose vein in the distal right thigh. No popliteal cyst.                                                                      IMPRESSION:  1.  No deep venous thrombosis in the right lower extremity.  2.  Superficial venous thrombosis involving varicose veins within the right mid and distal thigh.        Component      Latest Ref Rng 11/30/2023  1:00 PM   INR      0.85 - 1.15  0.97    Thrombin Time      13.0 - 19.0 Seconds 16.4    PTT Ratio      <1.21  0.94    DRVVT Screen Ratio      <1.08  0.75    Lupus Result      Negative  Negative    Lupus Interpretation The INR is normal.     Cardiolipin Dalia IgG Instrument Value      <10.0 GPL-U/mL 2.1    Cardiolipin IgG Dalia      Negative  Negative    Cardiolipin Dalia IgM Instrument Value      <10.0 MPL-U/mL 2.9    Cardiolipin IgM Dalia      Negative  Negative    Antithrombin III Chromogenic      85 - 135 % 101    Beta 2 Glycoprotein 1 Antibody IgG      <7.0 U/mL 1.8    Beta 2 Glycoprotein 1 Antibody IgM      <7.0 U/mL 2.6    Prot C Chromogenic      70 - 170 % 109    Protein S Antigen Free      70 - 148 % 104    Immunofixation ELP No monoclonal protein seen on immunofixation. Pathologic significance requires clinical correlation. COLTON Sanders M.D., Ph.D., Pathologist ()              A/P:     (I80.01) Thrombophlebitis of superficial veins of right lower extremity, worsened when not on AC, with now discovered Leiden heterozygosity, and a rash coincidentally occurring after starting Xarelto.   Comment: I informed him that it is not regularly recommended to undertake a thrombophilia eval after a first  VTE, and an SVT at that. He nonetheless has children and wanted to know for their sake. He is now known to be a Leiden heterozygote. This does not alter his management (he should be ACd with a DOAC or Lovenox/warfarin), but should cause him to inform his children to not smoke or use hormonal manipulation. Given anatomical extent of progression off of AC, he must be ACd, and with DVT treatment doses and for at least three months. He had a rash within 20-30 minutes of taking Xarelto on the first as well as the second day. I am not convinced this is due to Xarelto, but I can not be certain it is not due to Xarelto.     I advised he be ACd due to clot propagation when not ACd.     His options include:     1-retrialing Xarelto, accepting the fact that he may in fact be allergic to it, and continue it if he does not have a recurrent rash, but switch to Lovenox and warfarin if he does.     2-Do not resume Xarelto, and instead start Lovenox and warfarin and get set up for INR monitoring.      He should continue wear thigh high compression.     Plan: After discussing the above at length, he decides to retrial Xarelto. I told him to take his first dose with Benadryl tonight. If he does not get a rash with this, I advised he take his morning dose without Benadryl to ascertain if a rash does occur. If a rash does occur, he is to call our office tomorrow. He knows I am out of office tomorrow. I would, if he has a recurrent rash want him to start Lovenox 1 mg/kg SQ BID for five days, and start warfarin 5 mg daily on 12/8,9.10.11, and then have an ACC intake appt occur on 12/12.    Check US Venous Competency Bilateral, D dimer Quantitative in three months, RTC one week later. Consider cessation of AC at that time.              40 minutes total medical care on today's date.    MD location: office  Pt location: home    Phone call start: 13:46  Phone call end: 14:27    Extended time secondary to need to explain above options at  length for the patient.

## 2023-12-07 NOTE — PROGRESS NOTES
Elvis is a 58 year old who is being evaluated via a billable video visit.      How would you like to obtain your AVS? MyChart  If the video visit is dropped, the invitation should be resent by: Text to cell phone: [phone]  Will anyone else be joining your video visit? No            Objective           Vitals:  No vitals were obtained today due to virtual visit.    Video-Visit Details    Type of service:  Video Visit     Originating Location (pt. Location): Home    Distant Location (provider location):  On-site  Platform used for Video Visit: Isaura ROSENTHAL

## 2023-12-08 NOTE — TELEPHONE ENCOUNTER
Writer left a detail message for patient stating provider, Lupe Briones will call patient with results. Unless provider has already spoken with patient.

## 2023-12-15 ENCOUNTER — OFFICE VISIT (OUTPATIENT)
Dept: FAMILY MEDICINE | Facility: CLINIC | Age: 58
End: 2023-12-15
Payer: COMMERCIAL

## 2023-12-15 VITALS
WEIGHT: 178.8 LBS | HEIGHT: 72 IN | RESPIRATION RATE: 16 BRPM | OXYGEN SATURATION: 98 % | HEART RATE: 61 BPM | TEMPERATURE: 98 F | BODY MASS INDEX: 24.22 KG/M2 | SYSTOLIC BLOOD PRESSURE: 116 MMHG | DIASTOLIC BLOOD PRESSURE: 80 MMHG

## 2023-12-15 DIAGNOSIS — M89.9 DISORDER OF BONE AND CARTILAGE: ICD-10-CM

## 2023-12-15 DIAGNOSIS — E78.5 HYPERLIPIDEMIA LDL GOAL <100: ICD-10-CM

## 2023-12-15 DIAGNOSIS — M94.9 DISORDER OF BONE AND CARTILAGE: ICD-10-CM

## 2023-12-15 DIAGNOSIS — Z00.00 ROUTINE GENERAL MEDICAL EXAMINATION AT A HEALTH CARE FACILITY: Primary | ICD-10-CM

## 2023-12-15 DIAGNOSIS — I10 ESSENTIAL HYPERTENSION: ICD-10-CM

## 2023-12-15 DIAGNOSIS — Z12.5 SCREENING FOR PROSTATE CANCER: ICD-10-CM

## 2023-12-15 PROCEDURE — 99396 PREV VISIT EST AGE 40-64: CPT | Performed by: FAMILY MEDICINE

## 2023-12-15 RX ORDER — ALENDRONATE SODIUM 70 MG/1
TABLET ORAL
Qty: 12 TABLET | Refills: 1 | Status: CANCELLED | OUTPATIENT
Start: 2023-12-15

## 2023-12-15 RX ORDER — ATENOLOL 25 MG/1
25 TABLET ORAL DAILY
Qty: 90 TABLET | Refills: 3 | Status: CANCELLED | OUTPATIENT
Start: 2023-12-15

## 2023-12-15 RX ORDER — SIMVASTATIN 10 MG
10 TABLET ORAL AT BEDTIME
Qty: 90 TABLET | Refills: 3 | Status: CANCELLED | OUTPATIENT
Start: 2023-12-15

## 2023-12-15 ASSESSMENT — ENCOUNTER SYMPTOMS
DIARRHEA: 1
ABDOMINAL PAIN: 0
EYE PAIN: 0
WEAKNESS: 0
HEMATOCHEZIA: 0
SORE THROAT: 0
PALPITATIONS: 0
DIZZINESS: 0
PARESTHESIAS: 0
COUGH: 0
ARTHRALGIAS: 0
HEADACHES: 0
FEVER: 0
NAUSEA: 0
SHORTNESS OF BREATH: 0
MYALGIAS: 0
NERVOUS/ANXIOUS: 1
CHILLS: 0
HEARTBURN: 0
CONSTIPATION: 0
HEMATURIA: 0
FREQUENCY: 0
JOINT SWELLING: 0
DYSURIA: 0

## 2023-12-15 ASSESSMENT — PAIN SCALES - GENERAL: PAINLEVEL: NO PAIN (0)

## 2023-12-15 NOTE — PROGRESS NOTES
SUBJECTIVE:   Elvis is a 58 year old, presenting for the following:  Physical        12/15/2023     1:15 PM   Additional Questions   Roomed by Deyanira ANNE MA   Accompanied by Self       Healthy Habits:     Getting at least 3 servings of Calcium per day:  Yes    Bi-annual eye exam:  Yes    Dental care twice a year:  Yes    Sleep apnea or symptoms of sleep apnea:  Excessive snoring and Sleep apnea    Diet:  Regular (no restrictions)    Frequency of exercise:  4-5 days/week    Duration of exercise:  30-45 minutes    Taking medications regularly:  Yes    Medication side effects:  Lightheadedness    Additional concerns today:  No    Feeling well.         Social History     Tobacco Use    Smoking status: Never     Passive exposure: Never    Smokeless tobacco: Former     Quit date: 6/1/1987   Substance Use Topics    Alcohol use: Yes     Comment: 3-7 beers a week           12/15/2023     1:12 PM   Alcohol Use   Prescreen: >3 drinks/day or >7 drinks/week? No       Last PSA:   Prostate Specific Antigen Screen   Date Value Ref Range Status   01/12/2023 1.79 0.00 - 3.50 ng/mL Final   10/12/2017 1.8 0.0 - 3.5 ng/mL Final       Reviewed orders with patient. Reviewed health maintenance and updated orders accordingly - Yes  Lab work is in process    Reviewed and updated as needed this visit by clinical staff   Tobacco  Allergies  Meds              Reviewed and updated as needed this visit by Provider                 Past Medical History:   Diagnosis Date    Anxiety     History of angina     March 2021, work up no treatment needed    HLD (hyperlipidemia)     HTN (hypertension)     Osteoarthritis of left hip 6/5/2017    Osteopenia       Past Surgical History:   Procedure Laterality Date    COLONOSCOPY N/A 10/9/2023    Procedure: COLONOSCOPY, FLEXIBLE, WITH LESION REMOVAL USING SNARE;  Surgeon: Sean Little DO;  Location: [facility]    LAPAROSCOPIC HERNIORRHAPHY INGUINAL Left 4/22/2022    Procedure: HERNIORRHAPHY, INGUINAL,  LAPAROSCOPIC;  Surgeon: Bruce Roldan MD;  Location: [facility] OR    ORTHOPEDIC SURGERY  08/07/2017, 07/15/2021    L Hip Rplacement, R Hip replacement    SEPTOPLASTY, TURBINOPLASTY, COMBINED N/A 3/29/2022    Procedure: SEPTOPLASTY, NOSE, WITH TURBINOPLASTY Ballon assisted with cryoabation of posterior nasal tissue;  Surgeon: Flavio Odonnell MD;  Location: [facility] OR    TOTAL HIP ARTHROPLASTY Left 08/2017    [facility]        Review of Systems   Constitutional:  Negative for chills and fever.   HENT:  Positive for congestion. Negative for ear pain, hearing loss and sore throat.    Eyes:  Negative for pain and visual disturbance.   Respiratory:  Negative for cough and shortness of breath.    Cardiovascular:  Negative for chest pain, palpitations and peripheral edema.   Gastrointestinal:  Positive for diarrhea. Negative for abdominal pain, constipation, heartburn, hematochezia and nausea.   Genitourinary:  Negative for dysuria, frequency, genital sores, hematuria and urgency.   Musculoskeletal:  Negative for arthralgias, joint swelling and myalgias.   Skin:  Negative for rash.   Neurological:  Negative for dizziness, weakness, headaches and paresthesias.   Psychiatric/Behavioral:  Negative for mood changes. The patient is nervous/anxious.      CONSTITUTIONAL: NEGATIVE for fever, chills, change in weight  INTEGUMENTARY/SKIN: NEGATIVE for worrisome rashes, moles or lesions  EYES: NEGATIVE for vision changes or irritation  ENT: NEGATIVE for ear, mouth and throat problems  RESP: NEGATIVE for significant cough or SOB  CV: NEGATIVE for chest pain, palpitations or peripheral edema  GI: NEGATIVE for nausea, abdominal pain, heartburn, or change in bowel habits   male: negative for dysuria, hematuria, decreased urinary stream, erectile dysfunction, urethral discharge  MUSCULOSKELETAL: NEGATIVE for significant arthralgias or myalgia  NEURO: NEGATIVE for weakness, dizziness or paresthesias  PSYCHIATRIC:  NEGATIVE for changes in mood or affect    OBJECTIVE:   /80 (BP Location: Right arm, Patient Position: Chair, Cuff Size: Adult Regular)   Pulse 61   Temp 98  F (36.7  C) (Tympanic)   Resp 16   Ht 1.829 m (6')   Wt 81.1 kg (178 lb 12.8 oz)   SpO2 98%   BMI 24.25 kg/m      Physical Exam  GENERAL: healthy, alert and no distress  EYES: Eyes grossly normal to inspection, PERRL and conjunctivae and sclerae normal  HENT: ear canals and TM's normal, nose and mouth without ulcers or lesions  NECK: no adenopathy, no asymmetry, masses, or scars and thyroid normal to palpation  RESP: lungs clear to auscultation - no rales, rhonchi or wheezes  CV: regular rate and rhythm, normal S1 S2, no S3 or S4, no murmur, click or rub, no peripheral edema and peripheral pulses strong  ABDOMEN: soft, nontender, no hepatosplenomegaly, no masses and bowel sounds normal  MS: no gross musculoskeletal defects noted, no edema  SKIN: no suspicious lesions or rashes  NEURO: Normal strength and tone, mentation intact and speech normal  PSYCH: mentation appears normal, affect normal/bright    ASSESSMENT/PLAN:   Elvis was seen today for physical.    Diagnoses and all orders for this visit:    Routine general medical examination at a health care facility    Essential hypertension    Hyperlipidemia LDL goal <100  -     Lipid panel reflex to direct LDL Fasting; Future    Disorder of bone and cartilage    Screening for prostate cancer  -     PSA, screen; Future    Other orders  -     PRIMARY CARE FOLLOW-UP SCHEDULING; Future        Patient has been advised of split billing requirements and indicates understanding: Yes      COUNSELING:   Reviewed preventive health counseling, as reflected in patient instructions  Special attention given to:        Regular exercise       Healthy diet/nutrition       Vision screening        He reports that he has never smoked. He has never been exposed to tobacco smoke. He quit smokeless tobacco use about 36 years  ago.          KASH BLANK [facility]

## 2023-12-15 NOTE — LETTER
My Depression Action Plan  Name: Brennan Vazquez   Date of Birth 1965  Date: 12/15/2023    My doctor: Vernoica Anne   My clinic: [facility]  [address] [facility] [address]  [phone]            GREEN    ZONE   Good Control    What it looks like:   Things are going generally well. You have normal ups and downs. You may even feel depressed from time to time, but bad moods usually last less than a day.   What you need to do:  Continue to care for yourself (see self care plan)  Check your depression survival kit and update it as needed  Follow your physician s recommendations including any medication.  Do not stop taking medication unless you consult with your physician first.             YELLOW         ZONE Getting Worse    What it looks like:   Depression is starting to interfere with your life.   It may be hard to get out of bed; you may be starting to isolate yourself from others.  Symptoms of depression are starting to last most all day and this has happened for several days.   You may have suicidal thoughts but they are not constant.   What you need to do:     Call your care team. Your response to treatment will improve if you keep your care team informed of your progress. Yellow periods are signs an adjustment may need to be made.     Continue your self-care.  Just get dressed and ready for the day.  Don't give yourself time to talk yourself out of it.    Talk to someone in your support network.    Open up your Depression Self-Care Plan/Wellness Kit.             RED    ZONE Medical Alert - Get Help    What it looks like:   Depression is seriously interfering with your life.   You may experience these or other symptoms: You can t get out of bed most days, can t work or engage in other necessary activities, you have trouble taking care of basic hygiene, or basic responsibilities, thoughts of suicide or death that will not go away, self-injurious  behavior.     What you need to do:  Call your care team and request a same-day appointment. If they are not available (weekends or after hours) call your local crisis line, emergency room or 911.          Depression Self-Care Plan / Wellness Kit    Many people find that medication and therapy are helpful treatments for managing depression. In addition, making small changes to your everyday life can help to boost your mood and improve your wellbeing. Below are some tips for you to consider. Be sure to talk with your medical provider and/or behavioral health consultant if your symptoms are worsening or not improving.     Sleep   Sleep hygiene  means all of the habits that support good, restful sleep. It includes maintaining a consistent bedtime and wake time, using your bedroom only for sleeping or sex, and keeping the bedroom dark and free of distractions like a computer, smartphone, or television.     Develop a Healthy Routine  Maintain good hygiene. Get out of bed in the morning, make your bed, brush your teeth, take a shower, and get dressed. Don t spend too much time viewing media that makes you feel stressed. Find time to relax each day.    Exercise  Get some form of exercise every day. This will help reduce pain and release endorphins, the  feel good  chemicals in your brain. It can be as simple as just going for a walk or doing some gardening, anything that will get you moving.      Diet  Strive to eat healthy foods, including fruits and vegetables. Drink plenty of water. Avoid excessive sugar, caffeine, alcohol, and other mood-altering substances.     Stay Connected with Others  Stay in touch with friends and family members.    Manage Your Mood  Try deep breathing, massage therapy, biofeedback, or meditation. Take part in fun activities when you can. Try to find something to smile about each day.     Psychotherapy  Be open to working with a therapist if your provider recommends it.     Medication  Be sure to  take your medication as prescribed. Most anti-depressants need to be taken every day. It usually takes several weeks for medications to work. Not all medicines work for all people. It is important to follow-up with your provider to make sure you have a treatment plan that is working for you. Do not stop your medication abruptly without first discussing it with your provider.    Crisis Resources   These hotlines are for both adults and children. They and are open 24 hours a day, 7 days a week unless noted otherwise.    National Suicide Prevention Lifeline   988 or 3-635-361-MOLE (9699)    Crisis Text Line    www.crisistextline.org  Text HOME to 038473 from anywhere in the [place], anytime, about any type of crisis. A live, trained crisis counselor will receive the text and respond quickly.    Sujit Lifeline for LGBTQ Youth  A national crisis intervention and suicide lifeline for LGBTQ youth under 25. Provides a safe place to talk without judgement. Call 1-525.933.7240; text START to 176308 or visit www.thetrevorproject.org to talk to a trained counselor.    For [facility] crisis numbers, visit the [facility] website at:  https://[place].gov/dhs/people-we-serve/adults/health-care/mental-health/resources/crisis-contacts.jsp

## 2023-12-18 ENCOUNTER — PATIENT OUTREACH (OUTPATIENT)
Dept: GASTROENTEROLOGY | Facility: CLINIC | Age: 58
End: 2023-12-18
Payer: COMMERCIAL

## 2023-12-19 DIAGNOSIS — F33.0 MAJOR DEPRESSIVE DISORDER, RECURRENT EPISODE, MILD (H): ICD-10-CM

## 2023-12-19 RX ORDER — VILAZODONE HYDROCHLORIDE 20 MG/1
20 TABLET ORAL DAILY
Qty: 90 TABLET | Refills: 0 | OUTPATIENT
Start: 2023-12-19

## 2023-12-19 NOTE — TELEPHONE ENCOUNTER
Date of Last Office Visit: 12/6/23  Date of Next Office Visit: 1/17/24  No shows since last visit: 0  Cancellations since last visit: 0    Medication requested: vilazodone (VIIBRYD) 20 MG TABS tablet Date last ordered: 10/25/23 Qty: 90 Refills: 0     Review of [place] ?: NA    Lapse in medication adherence greater than 5 days?: Medication is to soon to fill ( due January 23, 2024)  If yes, call patient and gather details: NA  Medication refill request verified as identical to current order?: Na  Result of Last DAM, VPA, Li+ Level, CBC, or Carbamazepine Level (at or since last visit): N/A    Last visit treatment plan: 12/06/23    PLAN:     Patient advised of consultative model. Patient will continue to be seen for ongoing consultation and stabilization.  Does not meet criteria for involuntary treatment or hospitalization  Trial of Viibryd 7/31/23 10 mg daily mild to moderate efficacy, reduced libido/sexual dysfunction, some remnant challenges with energy and motivation => 10/25/23 20 mg po qday efficacy, tolerable sexual dysfunction, viagra works -Risks, benefits and alternatives discussed.  Patient provides verbal consent to treatment.   Continue Lunesta 2 mg nightly-Risks, benefits and alternatives discussed.  Patient provides verbal consent to treatment.  Advised of recommendation against long-term use, memory  Alprazolam 0.25 mg p.o. daily as needed anxiety  Backup - duloxetine 30 mg po qday  Discontinue trazodone (nasal conguestion)  Labs -reviewed  CBT-I  Waketime - 5:30am  Most important to be up and out of bed  Sets the biological sleep clock   Bedtime 10:30PM   Avoid any sleep before bedtime or after waketime unless an emergency  Stimulus Control  -our brain can get mixed up with what bed is for. the brain can associate the bed with alertness, thinking, anxiety, etc.  Sometimes we need to re train the brain that bed is for sleeping. Here's how to do it:  -if waking up in the middle of the night and think you  cannot fall asleep, recommend leaving the bed, relaxing, and returning to bed when you feel like you can fall asleep quickly  -We set a recommended bedtime; however, if you are wide-awake, angry, anxious or thinking a lot we recommend waiting until you feel like you could fall asleep quickly then going to bed.    sleep, behavioral activation with Elle FORBES  Return in 4-6 weeks    Administrative Billing:   Time spent with patient was greater than 50% of time and/or significant time was spent in counseling and coordination of care regarding above diagnoses and treatment plan. Pre charting time and post charting time/documentation/coordination are done on date of service.      Signed:   Austin Jackson M.D.  [facility] Psychiat[facility]    []Medication refilled per  Medication Refill in Ambulatory Care  policy.  [x]Medication unable to be refilled by RN due to criteria not met as indicated below:    []Eligibility - not seen in the last year   []Supervision - no future appointment   []Compliance - no shows, cancellations or lapse in therapy   []Verification - order discrepancy   []Controlled medication   []Medication not included in policy   []90-day supply request   [x]Other - Medication is to soon to fill

## 2023-12-23 ENCOUNTER — HEALTH MAINTENANCE LETTER (OUTPATIENT)
Age: 58
End: 2023-12-23

## 2024-01-08 ENCOUNTER — MYC MEDICAL ADVICE (OUTPATIENT)
Dept: OTHER | Facility: CLINIC | Age: 59
End: 2024-01-08
Payer: COMMERCIAL

## 2024-01-08 DIAGNOSIS — I80.01 THROMBOPHLEBITIS OF SUPERFICIAL VEINS OF RIGHT LOWER EXTREMITY: ICD-10-CM

## 2024-01-09 NOTE — TELEPHONE ENCOUNTER
12/7/23 virtual visit    Plan of care:     Check US Venous Competency Bilateral, D dimer Quantitative in three months, RTC one week later. Consider cessation of AC at that time.      11/29/23    [id number] - [place], [place] - [address] AT [address]

## 2024-01-16 DIAGNOSIS — E78.5 HYPERLIPIDEMIA LDL GOAL <100: ICD-10-CM

## 2024-01-17 RX ORDER — SIMVASTATIN 10 MG
10 TABLET ORAL AT BEDTIME
Qty: 90 TABLET | Refills: 3 | Status: SHIPPED | OUTPATIENT
Start: 2024-01-17

## 2024-01-17 NOTE — TELEPHONE ENCOUNTER
Pending Prescriptions:                       Disp   Refills    simvastatin (ZOCOR) 10 MG tablet [Pharmac*90 tab*3            Sig: TAKE 1 TABLET(10 MG) BY MOUTH AT BEDTIME    Routing refill request to provider for review/approval because:  LDL Cholesterol Calculated   Date Value Ref Range Status   01/12/2023 103 (H) <=100 mg/dL Final     Delvin Catherine RN

## 2024-01-24 ASSESSMENT — SLEEP AND FATIGUE QUESTIONNAIRES
HOW LIKELY ARE YOU TO NOD OFF OR FALL ASLEEP WHILE SITTING INACTIVE IN A PUBLIC PLACE: WOULD NEVER DOZE
HOW LIKELY ARE YOU TO NOD OFF OR FALL ASLEEP WHILE LYING DOWN TO REST IN THE AFTERNOON WHEN CIRCUMSTANCES PERMIT: MODERATE CHANCE OF DOZING
HOW LIKELY ARE YOU TO NOD OFF OR FALL ASLEEP WHEN YOU ARE A PASSENGER IN A CAR FOR AN HOUR WITHOUT A BREAK: SLIGHT CHANCE OF DOZING
HOW LIKELY ARE YOU TO NOD OFF OR FALL ASLEEP WHILE SITTING AND READING: SLIGHT CHANCE OF DOZING
HOW LIKELY ARE YOU TO NOD OFF OR FALL ASLEEP IN A CAR, WHILE STOPPED FOR A FEW MINUTES IN TRAFFIC: WOULD NEVER DOZE
HOW LIKELY ARE YOU TO NOD OFF OR FALL ASLEEP WHILE SITTING AND TALKING TO SOMEONE: WOULD NEVER DOZE
HOW LIKELY ARE YOU TO NOD OFF OR FALL ASLEEP WHILE SITTING QUIETLY AFTER LUNCH WITHOUT ALCOHOL: SLIGHT CHANCE OF DOZING
HOW LIKELY ARE YOU TO NOD OFF OR FALL ASLEEP WHILE WATCHING TV: SLIGHT CHANCE OF DOZING

## 2024-01-25 ENCOUNTER — VIRTUAL VISIT (OUTPATIENT)
Dept: SLEEP MEDICINE | Facility: CLINIC | Age: 59
End: 2024-01-25
Payer: COMMERCIAL

## 2024-01-25 VITALS — BODY MASS INDEX: 23.19 KG/M2 | WEIGHT: 175 LBS | HEIGHT: 73 IN

## 2024-01-25 DIAGNOSIS — I80.01 THROMBOPHLEBITIS OF SUPERFICIAL VEINS OF RIGHT LOWER EXTREMITY: ICD-10-CM

## 2024-01-25 DIAGNOSIS — G47.33 OSA (OBSTRUCTIVE SLEEP APNEA): Primary | ICD-10-CM

## 2024-01-25 PROCEDURE — 99213 OFFICE O/P EST LOW 20 MIN: CPT | Mod: 95 | Performed by: NURSE PRACTITIONER

## 2024-01-25 RX ORDER — RIVAROXABAN 15 MG-20MG
KIT ORAL
Refills: 0 | OUTPATIENT
Start: 2024-01-25

## 2024-01-25 ASSESSMENT — PAIN SCALES - GENERAL: PAINLEVEL: NO PAIN (0)

## 2024-01-25 NOTE — PROGRESS NOTES
Chief Complaint   Patient presents with    RECHECK       Brennan Vazquez is a 58 year old male who returns to [facility] for review of sleep testing results. He presented with symptoms suggestive of obstructive sleep apnea.    Estimated body mass index is 24.06 kg/m  as calculated from the following:    Height as of 11/29/23: 1.829 m (6').    Weight as of 11/29/23: 80.5 kg (177 lb 6.4 oz).  Total score - [place]: 7 (11/22/2023  4:06 PM)  Total Score: 6 (11/22/2023  4:08 PM)      Analysis Time:  587.4 minutes     Respiration:   Sleep Associated Hypoxemia: sustained hypoxemia was present. Baseline oxygen saturation was 94%.  Time with saturation less than or equal to 88% was 9.7 minutes. The lowest oxygen saturation was 80%.   Snoring: Snoring was present.  Respiratory events: The home study revealed a presence of 63 obstructive apneas and 15 mixed and central apneas. There were 44 hypopneas resulting in a combined apnea/hypopnea index [AHI] of 12.6 events per hour.  AHI was 14.7 per hour supine, 0 per hour prone, 3.8 per hour on left side, and 15.9 per hour on right side.   Pattern: Excluding events noted above, respiratory rate and pattern was Normal.     Position: Percent of time spent: supine - 60.5%, prone - 0%, on left - 21.4%, on right - 17.3%.     Heart Rate: By pulse oximetry normal rate was noted.      Brennan Vazquez reports that he slept Good .     Results were reviewed in detail today with Brenann and a copy given to him for his records.    Reviewed by team:  Tobacco  Allergies  Meds            Reviewed by provider:   Allergies  Meds  Problems                Problem List:  Patient Active Problem List    Diagnosis Date Noted    SAM3W61 normal metabolizer *1/*1 09/26/2023     Priority: Medium    CYP2D6 normal metabolizer *1/*2 09/26/2023     Priority: Medium    Left inguinal hernia 03/04/2022     Priority: Medium     Added automatically from request for surgery [id number]       "Nasal septal deviation 11/19/2021     Priority: Medium     Added automatically from request for surgery [id number]      Nasal turbinate hypertrophy 11/19/2021     Priority: Medium     Added automatically from request for surgery [id number]      Chronic rhinitis 11/19/2021     Priority: Medium     Added automatically from request for surgery [id number]      Primary osteoarthritis of left hip 06/29/2021     Priority: Medium    Age-related osteoporosis without current pathological fracture      Priority: Medium     Created by Conversion  Replacement Utility updated for latest IMO load        Hyperlipidemia LDL goal <100      Priority: Medium     Created by Conversion        Colon polyp 2017 10/15/2017     Priority: Medium     Every 5 years         Seasonal allergies 06/05/2017     Priority: Medium    Insomnia 06/05/2017     Priority: Medium     Difficulty staying asleep         Multiple lipomas 11/17/2016     Priority: Medium    Osteopenia      Priority: Medium     Created by Conversion  Replacement Utility updated for latest IMO load        Anxiety      Priority: Medium     Created by Conversion  Replacement Utility updated for latest IMO load        Hypertension      Priority: Medium     Created by Conversion  Replacement Utility updated for latest IMO load        Rosacea      Priority: Medium     Created by Conversion        Restless Legs Syndrome      Priority: Medium     Created by Conversion            Ht 1.854 m (6' 1\")   Wt 79.4 kg (175 lb)   BMI 23.09 kg/m      Impression/Plan:    Assessment:   Mild obstructive sleep apnea.  Sleep associated hypoxemia was present.     Recommendations:  Consider auto-CPAP at 5-15 cmH2O, oral appliance therapy, or positional therapy.  Patient opted for auto titrate CPAP therapy.  Will begin at 6-18 cm H2O.  Instructed patient to use his auto titrate CPAP minimum of 4 hours each day, 70% of the time.  Optimally however, patient encouraged to use the entire duration of his sleep to gain " maximum benefit.  Patient instructed to make a follow-up appointment approximately 8-12 weeks after he receives his CPAP equipment for an evaluation of efficacy and compliance.  Recommend patient optimize his sleep schedule as well as his sleep hygiene practices to mitigate any further sleep deprivation.  Recommend patient employ safe driving practices such as not driving motor vehicle should he become drowsy  Suggest optimizing sleep hygiene and avoiding sleep deprivation.  Weight management.     Diagnosis Code(s): Obstructive Sleep Apnea G47.33, Hypoxemia G47.36    Comorbidities:  Hypertension   Atenolol 25 mg, daily, managed by internal medicine  Anxiety   Alprazolam 0.25 mg, 1 tablet daily as needed for anxiety, managed by internal medicine    20 minutes spent with patient, all of which were spent face-to-face counseling, consulting, coordinating plan of care.      MARVEL Mcbride CNP  Sleep Medicine    This note was written with the assistance of the Dragon voice-dictation technology software. The final document, although reviewed, may contain errors. For corrections, please contact the office.      CC:  Veronica Anne,

## 2024-01-25 NOTE — NURSING NOTE
Is the patient currently in the state of [place]? YES    Visit mode:VIDEO    If the visit is dropped, the patient can be reconnected by: VIDEO VISIT: Send to e-mail at: [email or web address]    Will anyone else be joining the visit? NO  (If patient encounters technical issues they should call [phone])    How would you like to obtain your AVS? MyChart    Are changes needed to the allergy or medication list? Pt stated no changes to allergies and Pt stated no med changes    Reason for visit: RECHECK  Has patient had flu shot for current/most recent flu season? If so, when? Yes: 10/02/2023    Alba JOSEPH

## 2024-01-25 NOTE — PATIENT INSTRUCTIONS
Drive Safe... Drive Alive     Sleep health profoundly affects your health, mood, and your safety. 33% of the population (one in three of us) is not getting enough sleep and many have a sleep disorder. Not getting enough sleep or having an untreated / undertreated sleep condition may make us sleepy without even knowing it. In fact, our driving could be dramatically impaired due to our sleep health. As your provider, here are some things I would like you to know about driving:     Here are some warning signs for impairment and dangerous drowsy driving:              -Having been awake more than 16 hours               -Looking tired               -Eyelid drooping              -Head nodding (it could be too late at this point)              -Driving for more than 30 minutes     Some things you could do to make the driving safer if you are experiencing some drowsiness:              -Stop driving and rest              -Call for transportation              -Make sure your sleep disorder is adequately treated     Some things that have been shown NOT to work when experiencing drowsiness while driving:              -Turning on the radio              -Opening windows              -Eating any  distracting  /  entertaining  foods (e.g., sunflower seeds, candy, or any other)              -Talking on the phone      Your decision may not only impact your life, but also the life of others. Please, remember to drive safe for yourself and all of us.   Your Body mass index is 23.09 kg/m .  Weight management is a personal decision.  If you are interested in exploring weight loss strategies, the following discussion covers the approaches that may be successful. Body mass index (BMI) is one way to tell whether you are at a healthy weight, overweight, or obese. It measures your weight in relation to your height.  A BMI of 18.5 to 24.9 is in the healthy range. A person with a BMI of 25 to 29.9 is considered overweight, and someone with a BMI  of 30 or greater is considered obese. More than two-thirds of American adults are considered overweight or obese.  Being overweight or obese increases the risk for further weight gain. Excess weight may lead to heart disease and diabetes.  Creating and following plans for healthy eating and physical activity may help you improve your health.  Weight control is part of healthy lifestyle and includes exercise, emotional health, and healthy eating habits. Careful eating habits lifelong are the mainstay of weight control. Though there are significant health benefits from weight loss, long-term weight loss with diet alone may be very difficult to achieve- studies show long-term success with dietary management in less than 10% of people. Attaining a healthy weight may be especially difficult to achieve in those with severe obesity. In some cases, medications, devices and surgical management might be considered.  What can you do?  If you are overweight or obese and are interested in methods for weight loss, you should discuss this with your provider.   Consider reducing daily calorie intake by 500 calories.   Keep a food journal.   Avoiding skipping meals, consider cutting portions instead.    Diet combined with exercise helps maintain muscle while optimizing fat loss. Strength training is particularly important for building and maintaining muscle mass. Exercise helps reduce stress, increase energy, and improves fitness. Increasing exercise without diet control, however, may not burn enough calories to loose weight.     Start walking three days a week 10-20 minutes at a time  Work towards walking thirty minutes five days a week   Eventually, increase the speed of your walking for 1-2 minutes at time    In addition, we recommend that you review healthy lifestyles and methods for weight loss available through the National Institutes of Health patient information sites:  http://win.niddk.nih.gov/publications/index.htm    And  look into health and wellness programs that may be available through your health insurance provider, employer, local community center, or [employer] club.

## 2024-01-25 NOTE — PROGRESS NOTES
Virtual Visit Details    Type of service:  Video Visit 8:30 AM-8:45 AM    Originating Location (pt. Location): Home    Distant Location (provider location):  Off-site  Platform used for Video Visit: Gina

## 2024-01-30 ENCOUNTER — DOCUMENTATION ONLY (OUTPATIENT)
Dept: SLEEP MEDICINE | Facility: CLINIC | Age: 59
End: 2024-01-30
Payer: COMMERCIAL

## 2024-01-30 DIAGNOSIS — G47.33 OSA (OBSTRUCTIVE SLEEP APNEA): Primary | ICD-10-CM

## 2024-01-30 NOTE — PROGRESS NOTES
Patient was offered choice of vendor and chose [facility].  Patient Brennan Vazquez was set up at [place]  on January 30, 2024. Patient received a Resmed Airsense 11 Pressures were set at  6-18 cm H2O.   Patient s ramp is 5 cm H2O for Auto and FLEX/EPR is EPR, 2.  Patient received a Resmed Mask name: Airfit N20  Nasal mask size Large, heated tubing and heated humidifier.  Patient has the following compliance requirements: usage only  Patient has a follow up on n/a with Soraya Briones CNP.    Ivonne Back

## 2024-02-05 ENCOUNTER — DOCUMENTATION ONLY (OUTPATIENT)
Dept: SLEEP MEDICINE | Facility: CLINIC | Age: 59
End: 2024-02-05
Payer: COMMERCIAL

## 2024-02-05 NOTE — PROGRESS NOTES
3 day Sleep therapy management telephone visit    Diagnostic AHI:  12.6 HST    Confirmed with patient at time of call- N/A Patient is still interested in STM service       Message left for patient to return call.        Objective data     Order Settings for PAP  CPAP min     CPAP max              Device settings from machine CPAP min 6.0     CPAP max 18.0           EPR Setting TWO    RESMED soft response  OFF     Assessment: Nightly usage over four hours      Action plan: Patient to have 14 day STM visit. Patient has a follow up visit scheduled:   no    Replacement device: No  STM ordered by provider: Yes     Total time spent on accessing and  interpreting remote patient PAP therapy data  10 minutes    Total time spent counseling, coaching  and reviewing PAP therapy data with patient  1 minutes    [id number] no

## 2024-02-15 ENCOUNTER — DOCUMENTATION ONLY (OUTPATIENT)
Dept: SLEEP MEDICINE | Facility: CLINIC | Age: 59
End: 2024-02-15
Payer: COMMERCIAL

## 2024-02-15 NOTE — PROGRESS NOTES
14  DAY STM VISIT    Diagnostic AHI:  12.6 HST    Message left for patient to return call     Assessment: Pt meeting objective benchmarks.      Action plan: waiting for patient to return call.  and pt to have 30 day STM visit.      Device type: Auto-CPAP    PAP settings:  CPAP MIN CPAP MAX 95TH % PRESSURE EPR RESMED SOFT RESPONSE SETTING   6.0 cm  H20 18.0 cm  H20 7.9 cm  H20  TWO OFF     Mask type:  Nasal Mask    Objective measures: 14 day rolling measures   COMPLIANCE LEAK AHI AVERAGE USE IN MINUTES   78 % 15.9 2.03 281   GOAL >70% GOAL < 24 LPM GOAL <5 GOAL >240      Patient has the following upcoming sleep appts:      Total time spent on accessing and interpreting remote patient PAP therapy data  10 minutes    Total time spent counseling, coaching  and reviewing PAP therapy data with patient  1 minute    [id number]  [id number]  no (3 day STM)

## 2024-03-10 DIAGNOSIS — G47.00 INSOMNIA, UNSPECIFIED TYPE: ICD-10-CM

## 2024-03-11 RX ORDER — ESZOPICLONE 2 MG/1
2 TABLET, FILM COATED ORAL AT BEDTIME
Qty: 90 TABLET | Refills: 0 | Status: SHIPPED | OUTPATIENT
Start: 2024-03-11 | End: 2024-06-12

## 2024-04-04 DIAGNOSIS — I10 ESSENTIAL HYPERTENSION: ICD-10-CM

## 2024-04-05 RX ORDER — ATENOLOL 25 MG/1
TABLET ORAL
Qty: 90 TABLET | Refills: 1 | Status: SHIPPED | OUTPATIENT
Start: 2024-04-05 | End: 2024-07-24

## 2024-04-25 NOTE — ANESTHESIA PROCEDURE NOTES
Airway       Patient location during procedure: OR       Procedure Start/Stop Times: 4/22/2022 12:59 PM  Staff -        CRNA: Radha Cabrera APRN CRNA       Performed By: CRNA  Consent for Airway        Urgency: elective  Indications and Patient Condition       Indications for airway management: miguel angel-procedural         Mask difficulty assessment: 0 - not attempted    Final Airway Details       Final airway type: endotracheal airway       Successful airway: ETT - single  Endotracheal Airway Details        ETT size (mm): 7.5       Cuffed: yes       Successful intubation technique: direct laryngoscopy       DL Blade Type: MAC 4       Adjucts: stylet and tooth guard       Position: Right       Measured from: lips       Secured at (cm): 22    Post intubation assessment        Placement verified by: capnometry, equal breath sounds and chest rise        Number of attempts at approach: 1       Number of other approaches attempted: 0       Secured with: silk tape       Ease of procedure: easy       Dentition: Intact and Unchanged    Medication(s) Administered   Medication Administration Time: 4/22/2022 12:59 PM      
ambulatory

## 2024-06-09 DIAGNOSIS — G47.00 INSOMNIA, UNSPECIFIED TYPE: ICD-10-CM

## 2024-06-12 RX ORDER — ESZOPICLONE 2 MG/1
2 TABLET, FILM COATED ORAL AT BEDTIME
Qty: 90 TABLET | Refills: 1 | Status: SHIPPED | OUTPATIENT
Start: 2024-06-12 | End: 2024-07-24

## 2024-06-16 ENCOUNTER — MYC REFILL (OUTPATIENT)
Dept: FAMILY MEDICINE | Facility: CLINIC | Age: 59
End: 2024-06-16
Payer: COMMERCIAL

## 2024-06-16 DIAGNOSIS — G47.00 INSOMNIA, UNSPECIFIED TYPE: ICD-10-CM

## 2024-06-17 RX ORDER — ESZOPICLONE 2 MG/1
2 TABLET, FILM COATED ORAL AT BEDTIME
Qty: 90 TABLET | Refills: 1 | OUTPATIENT
Start: 2024-06-17

## 2024-07-24 ENCOUNTER — ANCILLARY PROCEDURE (OUTPATIENT)
Dept: GENERAL RADIOLOGY | Facility: CLINIC | Age: 59
End: 2024-07-24
Attending: FAMILY MEDICINE
Payer: COMMERCIAL

## 2024-07-24 ENCOUNTER — OFFICE VISIT (OUTPATIENT)
Dept: FAMILY MEDICINE | Facility: CLINIC | Age: 59
End: 2024-07-24
Payer: COMMERCIAL

## 2024-07-24 VITALS
OXYGEN SATURATION: 98 % | RESPIRATION RATE: 16 BRPM | SYSTOLIC BLOOD PRESSURE: 114 MMHG | DIASTOLIC BLOOD PRESSURE: 84 MMHG | HEIGHT: 73 IN | HEART RATE: 68 BPM | WEIGHT: 183 LBS | BODY MASS INDEX: 24.25 KG/M2

## 2024-07-24 DIAGNOSIS — I82.811 SUPERFICIAL THROMBOSIS OF LEG, RIGHT: ICD-10-CM

## 2024-07-24 DIAGNOSIS — R05.3 CHRONIC COUGH: ICD-10-CM

## 2024-07-24 DIAGNOSIS — G47.00 INSOMNIA, UNSPECIFIED TYPE: ICD-10-CM

## 2024-07-24 DIAGNOSIS — E78.5 HYPERLIPIDEMIA LDL GOAL <100: ICD-10-CM

## 2024-07-24 DIAGNOSIS — K21.00 GASTROESOPHAGEAL REFLUX DISEASE WITH ESOPHAGITIS, UNSPECIFIED WHETHER HEMORRHAGE: ICD-10-CM

## 2024-07-24 DIAGNOSIS — F33.0 MILD EPISODE OF RECURRENT MAJOR DEPRESSIVE DISORDER (H): ICD-10-CM

## 2024-07-24 DIAGNOSIS — F41.9 ANXIETY: ICD-10-CM

## 2024-07-24 DIAGNOSIS — Z12.5 SCREENING FOR PROSTATE CANCER: ICD-10-CM

## 2024-07-24 DIAGNOSIS — N52.9 ERECTILE DYSFUNCTION, UNSPECIFIED ERECTILE DYSFUNCTION TYPE: ICD-10-CM

## 2024-07-24 DIAGNOSIS — I10 ESSENTIAL HYPERTENSION: ICD-10-CM

## 2024-07-24 DIAGNOSIS — J31.0 CHRONIC RHINITIS: Primary | ICD-10-CM

## 2024-07-24 LAB
CHOLEST SERPL-MCNC: 163 MG/DL
FASTING STATUS PATIENT QL REPORTED: NO
HDLC SERPL-MCNC: 59 MG/DL
LDLC SERPL CALC-MCNC: 88 MG/DL
NONHDLC SERPL-MCNC: 104 MG/DL
PSA SERPL DL<=0.01 NG/ML-MCNC: 2.14 NG/ML (ref 0–3.5)
TRIGL SERPL-MCNC: 82 MG/DL

## 2024-07-24 PROCEDURE — G0103 PSA SCREENING: HCPCS | Performed by: FAMILY MEDICINE

## 2024-07-24 PROCEDURE — 36415 COLL VENOUS BLD VENIPUNCTURE: CPT | Performed by: FAMILY MEDICINE

## 2024-07-24 PROCEDURE — 82607 VITAMIN B-12: CPT | Performed by: FAMILY MEDICINE

## 2024-07-24 PROCEDURE — G2211 COMPLEX E/M VISIT ADD ON: HCPCS | Performed by: FAMILY MEDICINE

## 2024-07-24 PROCEDURE — 71046 X-RAY EXAM CHEST 2 VIEWS: CPT | Mod: TC | Performed by: RADIOLOGY

## 2024-07-24 PROCEDURE — 99214 OFFICE O/P EST MOD 30 MIN: CPT | Performed by: FAMILY MEDICINE

## 2024-07-24 PROCEDURE — 80061 LIPID PANEL: CPT | Performed by: FAMILY MEDICINE

## 2024-07-24 PROCEDURE — 93000 ELECTROCARDIOGRAM COMPLETE: CPT | Performed by: FAMILY MEDICINE

## 2024-07-24 RX ORDER — OMEPRAZOLE 40 MG/1
40 CAPSULE, DELAYED RELEASE ORAL DAILY
Qty: 90 CAPSULE | Refills: 0 | Status: SHIPPED | OUTPATIENT
Start: 2024-07-24

## 2024-07-24 RX ORDER — ESZOPICLONE 2 MG/1
2 TABLET, FILM COATED ORAL AT BEDTIME
Qty: 90 TABLET | Refills: 1 | Status: SHIPPED | OUTPATIENT
Start: 2024-07-24

## 2024-07-24 RX ORDER — ATENOLOL 25 MG/1
25 TABLET ORAL DAILY
Qty: 90 TABLET | Refills: 3 | Status: SHIPPED | OUTPATIENT
Start: 2024-07-24

## 2024-07-24 RX ORDER — ALPRAZOLAM 0.25 MG
0.25 TABLET ORAL DAILY PRN
Qty: 30 TABLET | Refills: 3 | Status: SHIPPED | OUTPATIENT
Start: 2024-07-24

## 2024-07-24 RX ORDER — SILDENAFIL CITRATE 20 MG/1
10 TABLET ORAL PRN
Qty: 30 TABLET | Refills: 1 | Status: SHIPPED | OUTPATIENT
Start: 2024-07-24 | End: 2024-07-29

## 2024-07-24 NOTE — PROGRESS NOTES
Assessment & Plan     Essential hypertension  Well controlled.  - atenolol (TENORMIN) 25 MG tablet  Dispense: 90 tablet; Refill: 3  - EKG 12-lead complete w/read - Clinics    Anxiety  Using sparingly  - ALPRAZolam (XANAX) 0.25 MG tablet  Dispense: 30 tablet; Refill: 3    Erectile dysfunction, unspecified erectile dysfunction type  Refill provided    Insomnia, unspecified type  Discussed CBT-I  - eszopiclone (LUNESTA) 2 MG tablet  Dispense: 90 tablet; Refill: 1  - Vitamin B12  - Vitamin B12    Chronic rhinitis  - Adult ENT  Referral    Gastroesophageal reflux disease with esophagitis, unspecified whether hemorrhage  - omeprazole (PRILOSEC) 40 MG DR capsule  Dispense: 90 capsule; Refill: 0    Chronic cough  ? Of PND trial of nasal saline  - XR Chest 2 Views    Superficial thrombosis of leg, right  Needs follow up with vascular  - US Lower Extremity Venous Duplex Bilateral    Mild episode of recurrent major depressive disorder (H24)  Doing well. Manjarrez snot desire medication    Hyperlipidemia LDL goal <100  - Lipid panel reflex to direct LDL Fasting    Screening for prostate cancer  - PSA, screen    The longitudinal plan of care for the diagnosis(es)/condition(s) as documented were addressed during this visit. Due to the added complexity in care, I will continue to support Elvis in the subsequent management and with ongoing continuity of care.    Subjective   Elvis is a 59 year old, presenting for the following health issues:  Recheck Medication        7/24/2024    12:47 PM   Additional Questions   Roomed by Deyanira ANNE MA   Accompanied by Self     History of Present Illness       Reason for visit:  Medication checkup and referrals    He eats 2-3 servings of fruits and vegetables daily.He consumes 1 sweetened beverage(s) daily.He exercises with enough effort to increase his heart rate 20 to 29 minutes per day.  He exercises with enough effort to increase his heart rate 4 days per week.   He is taking medications  "regularly.       Referrals- Would like US ordered for follow up on DVT.  Was supposed to have it done by the end of May, but didn't.  Is still taking Xarelto. Would also like an order for DEXA as well.    Medication refills- Sildenafil, alprazolam  Off of fosamax for 8 months     Review of Systems  Constitutional, HEENT, cardiovascular, pulmonary, gi and gu systems are negative, except as otherwise noted.      Objective    /84 (BP Location: Right arm, Patient Position: Chair, Cuff Size: Adult Regular)   Pulse 68   Resp 16   Ht 1.854 m (6' 1\")   Wt 83 kg (183 lb)   SpO2 98%   BMI 24.14 kg/m    Body mass index is 24.14 kg/m .  Physical Exam  Constitutional:       Appearance: Normal appearance.   HENT:      Head: Normocephalic.   Cardiovascular:      Rate and Rhythm: Normal rate and regular rhythm.      Pulses: Normal pulses.   Pulmonary:      Effort: Pulmonary effort is normal.      Breath sounds: Normal breath sounds.   Musculoskeletal:      Right lower leg: No edema.      Left lower leg: No edema.   Skin:     General: Skin is warm and dry.   Neurological:      Mental Status: He is alert.        Signed Electronically by: KASH BALNK DO    "

## 2024-07-25 LAB — VIT B12 SERPL-MCNC: 2894 PG/ML (ref 232–1245)

## 2024-07-26 ENCOUNTER — TELEPHONE (OUTPATIENT)
Dept: FAMILY MEDICINE | Facility: CLINIC | Age: 59
End: 2024-07-26
Payer: COMMERCIAL

## 2024-07-29 DIAGNOSIS — N52.9 ERECTILE DYSFUNCTION, UNSPECIFIED ERECTILE DYSFUNCTION TYPE: ICD-10-CM

## 2024-07-29 RX ORDER — SILDENAFIL CITRATE 20 MG/1
10 TABLET ORAL PRN
Qty: 30 TABLET | Refills: 1 | Status: SHIPPED | OUTPATIENT
Start: 2024-07-29

## 2024-07-29 NOTE — TELEPHONE ENCOUNTER
PRIOR AUTHORIZATION DENIED    Medication: SILDENAFIL CITRATE 20 MG PO TABS  Insurance Company: [employer] [place] - Phone [phone] Fax [phone]  Denial Date: 7/26/2024  Denial Reason(s): Diagnosis is Excluded      Appeal Information: N/A  Patient Notified: No

## 2024-07-29 NOTE — TELEPHONE ENCOUNTER
Disp Refills Start End MANUEL   sildenafil (REVATIO) 20 MG tablet 30 tablet 1 7/24/2024 -- No   Sig - Route: Take 0.5 tablets (10 mg) by mouth as needed (erectile dysfunction) TAKE 1 TABLET BY MOUTH 1 HOUR PRIOR TO SEXUAL INTERCOURSE Strength: 20 mg - Oral     Please provide clarification.     Past prescription TAKE 1 TABLET BY MOUTH 1 HOUR PRIOR TO SEXUAL INTERCOURSE Strength: 20 mg     Zaria Jj RN on 7/29/2024 at 1:59 PM

## 2024-07-29 NOTE — TELEPHONE ENCOUNTER
Hello,     There are 2 sets of directions on this medication and we just need to know which one you would like us to use.   Is it the 1/2 tablet or 1 tablet?    Thank you,      Lillian Rosales  Certified Pharmacy Technician II, [facility]  Ph: [phone]  Fx: [phone]

## 2024-07-29 NOTE — TELEPHONE ENCOUNTER
Please notify patient. [facility] can also [facility] to see if this is more affordable.     KASH BLANK, DO

## 2024-07-31 NOTE — TELEPHONE ENCOUNTER
Elvis notified and will pay out of pocket or will maybe check into the [facility].    Lena Montenegro RN

## 2024-08-05 ENCOUNTER — HOSPITAL ENCOUNTER (OUTPATIENT)
Dept: ULTRASOUND IMAGING | Facility: CLINIC | Age: 59
Discharge: HOME | End: 2024-08-05
Attending: FAMILY MEDICINE | Admitting: FAMILY MEDICINE
Payer: COMMERCIAL

## 2024-08-05 DIAGNOSIS — I82.811 SUPERFICIAL THROMBOSIS OF LEG, RIGHT: ICD-10-CM

## 2024-08-05 PROCEDURE — 93970 EXTREMITY STUDY: CPT

## 2024-08-23 ENCOUNTER — MYC MEDICAL ADVICE (OUTPATIENT)
Dept: OTHER | Facility: CLINIC | Age: 59
End: 2024-08-23
Payer: COMMERCIAL

## 2024-08-23 DIAGNOSIS — I80.01 THROMBOPHLEBITIS OF SUPERFICIAL VEINS OF RIGHT LOWER EXTREMITY: Primary | ICD-10-CM

## 2024-08-26 NOTE — TELEPHONE ENCOUNTER
"Patient sent [employer] message requesting Dr. Manuel review BLE venous US done on 8/5/24.  Patient's PCP recommended follow up with .    Review of chart:  --12/7/23 last appt with Dr. Manuel  --Recommended 3 month follow up \"Check US Venous Competency Bilateral, D dimer Quantitative in three months, RTC one week later. Consider cessation of AC at that time.\"  --Does not appear that patient followed up; reminder letters were sent on 1/25/24  and 2/27/24    Routing to scheduling to coordinate the following:  Follow up to 12/7/23   D-dimer (non fasting labs)  Follow up one week later, in clinic.    Theresa French, NISHAN, RN, [facility] [place]    "

## 2024-08-27 NOTE — TELEPHONE ENCOUNTER
Called patient to schedule his D-dimer and follow up appointment with Dr Manuel.  Patient is asking for a Virtual visit due to distant. Patient was advised that instructions did state for an in clinic visit but he would still like writer to check.

## 2024-08-27 NOTE — TELEPHONE ENCOUNTER
Jean Shen for virtual follow up visit?  Pt lives in [place]    Thank you  Alexus Renteria RN on 8/27/2024 at 4:17 PM

## 2024-08-29 ENCOUNTER — LAB (OUTPATIENT)
Dept: LAB | Facility: CLINIC | Age: 59
End: 2024-08-29
Payer: COMMERCIAL

## 2024-08-29 DIAGNOSIS — I80.01 THROMBOPHLEBITIS OF SUPERFICIAL VEINS OF RIGHT LOWER EXTREMITY: ICD-10-CM

## 2024-08-29 LAB — D DIMER PPP FEU-MCNC: <0.27 UG/ML FEU (ref 0–0.5)

## 2024-08-29 PROCEDURE — 85379 FIBRIN DEGRADATION QUANT: CPT

## 2024-08-29 PROCEDURE — 36415 COLL VENOUS BLD VENIPUNCTURE: CPT

## 2024-09-06 ENCOUNTER — VIRTUAL VISIT (OUTPATIENT)
Dept: OTHER | Facility: CLINIC | Age: 59
End: 2024-09-06
Attending: INTERNAL MEDICINE
Payer: COMMERCIAL

## 2024-09-06 DIAGNOSIS — I80.01 THROMBOPHLEBITIS OF SUPERFICIAL VEINS OF RIGHT LOWER EXTREMITY: ICD-10-CM

## 2024-09-06 PROCEDURE — 99443 PR PHYSICIAN TELEPHONE EVALUATION 21-30 MIN: CPT | Mod: 93 | Performed by: INTERNAL MEDICINE

## 2024-09-06 NOTE — PROGRESS NOTES
Elvis is a 59 year old who is being evaluated via a billable telephone visit.    What phone number would you like to be contacted at? [phone]  How would you like to obtain your AVS? MyChart  Originating Location (pt. Location): Home    Distant Location (provider location):  On-site    Sonam Amin MA

## 2024-09-06 NOTE — PROGRESS NOTES
VASCULAR MEDICINE FOLLOW UP VISIT             A/P:     (I80.01) Thrombophlebitis of superficial veins of right lower extremity, worsened when not on AC, with now discovered Leiden heterozygosity, and a rash coincidentally occurring after starting Xarelto.     Comment: I informed him that it is not regularly recommended to undertake a thrombophilia eval after a first VTE, and an SVT at that. He nonetheless has children and wanted to know for their sake. He is now known to be a Leiden heterozygote. This does not alter his management (he should be ACd with a DOAC or Lovenox/warfarin), but should cause him to inform his children to not smoke or use hormonal manipulation. Given anatomical extent of progression off of AC, he must be ACd, and with DVT treatment doses and for at least three months. He had a rash within 20-30 minutes of taking Xarelto on the first as well as the second day.      After discussing the above at length, he decided to retrial Xarelto and has remained on that without a rash.      He did not check a US Venous Competency Bilateral, D dimer Quantitative in 03/2024, or RTC one week later as advised to consider cessation of AC at that time. Reminder letters were sent on 1/25/24 and 2/27/24.      On 08/05/2024 he had a duplex revealing a short segment nonocclusive superficial thrombophlebitis involving a varicosity of the right thigh. His PCP suggested I follow up with him about this and we have arranged a virtual visit to that end. D dimer of 08/29/24 is undetectable.      Plan: Stop Xarelto, repeat d dimer in one month. If remains normal , stay off of Xarelto, stop checking d dimer, Wear compression hosiery.      21 minutes total medical care on today's date.     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 He did not take  Xarelto as prescribed due to costs, and had sxs progress. This correlated with anatomical progression of SVT such that it then began to involve the thigh and calf of the RLE. The anatomical extent of thrombus exceeded 5 cm in size. He had not worn compression hosiery.  The patient presents today to address the above. He has children and wanted to know if he has a thrombophilia for their sake more so than his own. He does not smoke. There is not a FHx of VTE. This was his first lifetime VTE, no h/o DVT/PE. He works as a short . No antecedent long driving, flying, or immobilization.      On 11/29/23, I informed him  that it is not regularly recommended to undertake a thrombophilia eval after a first VTE, and an SVT at that. He nonetheless has children and wanted to know for their sake. We therefore checked a thrombophilia eval to include: Antithrombin III, Beta 2 Glycoprotein 1 Antibody IgG, Beta 2 Glycoprotein 1 Antibody IgM, Cardiolipin Dalia IgG and IgM, Factor 2 and 5 mutation analysis, Lupus Anticoagulant Panel, Protein C chromogenic, Protein S Antigen Free, immunofixation. The results were drawn prior to starting AC and are notable for the fact that he is a Leiden heterozygote. All other results were normal. Given the anatomical extent of progression off of AC, I advised he be ACd, and with DVT treatment doses and for at least three months. He started this on 12/04 /2023 and then sent us a [employer] message on 12/06/23 to state that broke out in an itchy rash on his chest, neck and head on 12/04 and 12/05. Also, his face was very flush and he felt dizzy. He used Benadryl both days and that helped very much but took several hours to go away. He had no airway issues, no tongue swelling.      He presented for a video visit 12/07/2023 to discuss his thrombophilia results, his interval clinical response to two days AC, and to determine what to do about his sxs encountered after taking Xarelto as  above.    I was not convinced this was due to Xarelto, but I could not be certain it is not due to Xarelto.      I advised he be ACd due to clot propagation when not ACd.      His options included:      1-retrialing Xarelto, accepting the fact that he may in fact be allergic to it, and continue it if he does not have a recurrent rash, but switch to Lovenox and warfarin if he does.      2-Do not resume Xarelto, and instead start Lovenox and warfarin and get set up for INR monitoring.       He should continue wear thigh high compression.      After discussing the above at length, he decided to retrial Xarelto and has remained on that without a rash.      He did not check a US Venous Competency Bilateral, D dimer Quantitative in 03/2024, or RTC one week later as advised to consider cessation of AC at that time. Reminder letters were sent on 1/25/24 and 2/27/24.      On 08/05/2024 he had a duplex revealing a short segment nonocclusive superficial thrombophlebitis involving a varicosity of the right thigh. His PCP suggested I follow up with him about this and we have arranged a virtual visit to that end. D dimer of 08/29/24 is undetectable.           Review Of Systems  Skin: negative  Eyes: negative  Ears/Nose/Throat: negative  Respiratory: No shortness of breath, dyspnea on exertion, cough, or hemoptysis  Cardiovascular: negative  Gastrointestinal: negative  Genitourinary: negative  Musculoskeletal: negative  Neurologic: negative  Psychiatric: negative  Hematologic/Lymphatic/Immunologic: negative  Endocrine:      PAST MEDICAL HISTORY:                  Past Medical History:   Diagnosis Date    Anxiety     History of angina     March 2021, work up no treatment needed    HLD (hyperlipidemia)     HTN (hypertension)     Osteoarthritis of left hip 6/5/2017    Osteopenia        PAST SURGICAL HISTORY:                  Past Surgical History:   Procedure Laterality Date    COLONOSCOPY N/A 10/9/2023    Procedure: COLONOSCOPY,  FLEXIBLE, WITH LESION REMOVAL USING SNARE;  Surgeon: Sean Little DO;  Location: [facility]    LAPAROSCOPIC HERNIORRHAPHY INGUINAL Left 4/22/2022    Procedure: HERNIORRHAPHY, INGUINAL, LAPAROSCOPIC;  Surgeon: Bruce Roldan MD;  Location: [facility] OR    ORTHOPEDIC SURGERY  08/07/2017, 07/15/2021    L Hip Rplacement, R Hip replacement    SEPTOPLASTY, TURBINOPLASTY, COMBINED N/A 3/29/2022    Procedure: SEPTOPLASTY, NOSE, WITH TURBINOPLASTY [place] assisted with cryoabation of posterior nasal tissue;  Surgeon: Flavio Odonnell MD;  Location: [facility] OR    TOTAL HIP ARTHROPLASTY Left 08/2017    [facility]        CURRENT MEDICATIONS:                  Current Outpatient Medications   Medication Sig Dispense Refill    ALPRAZolam (XANAX) 0.25 MG tablet Take 1 tablet (0.25 mg) by mouth daily as needed for anxiety 30 tablet 3    atenolol (TENORMIN) 25 MG tablet Take 1 tablet (25 mg) by mouth daily 90 tablet 3    calcium carbonate-vitamin D3 (CALTRATE 600 PLUS D3) 600 mg(1,500mg) -400 unit per tablet [CALCIUM CARBONATE-VITAMIN D3 (CALTRATE 600 PLUS D3) 600 MG(1,500MG) -400 UNIT PER TABLET] Take 1 tablet by mouth daily.      eszopiclone (LUNESTA) 2 MG tablet Take 1 tablet (2 mg) by mouth at bedtime 90 tablet 1    multivitamin therapeutic tablet [MULTIVITAMIN THERAPEUTIC TABLET] Take 1 tablet by mouth daily.      omeprazole (PRILOSEC) 40 MG DR capsule Take 1 capsule (40 mg) by mouth daily 90 capsule 0    rivaroxaban ANTICOAGULANT (XARELTO) 20 MG TABS tablet Take 1 tablet (20 mg) by mouth daily (with dinner) 90 tablet 3    sildenafil (REVATIO) 20 MG tablet Take 0.5 tablets (10 mg) by mouth as needed (erectile dysfunction) 30 tablet 1    simvastatin (ZOCOR) 10 MG tablet TAKE 1 TABLET(10 MG) BY MOUTH AT BEDTIME 90 tablet 3    vitamin B-12 (CYANOCOBALAMIN) 100 MCG tablet Take 1,000 mcg by mouth daily      Rivaroxaban ANTICOAGULANT 15 & 20 MG TBPK Starter Therapy Pack Take 15 mg by mouth 2 times daily (with  meals) for 21 days, THEN 20 mg daily with food for 9 days. 51 each 0       ALLERGIES:                No Known Allergies    SOCIAL HISTORY:                  Social History     Socioeconomic History    Marital status:      Spouse name: Not on file    Number of children: 2    Years of education: Not on file    Highest education level: Not on file   Occupational History    Not on file   Tobacco Use    Smoking status: Never     Passive exposure: Never    Smokeless tobacco: Former     Quit date: 6/1/1987   Vaping Use    Vaping status: Never Used   Substance and Sexual Activity    Alcohol use: Yes     Comment: 2-3 beers a week    Drug use: No    Sexual activity: Yes     Partners: Female     Birth control/protection: None   Other Topics Concern    Parent/sibling w/ CABG, MI or angioplasty before 65F 55M? No   Social History Narrative    His daughters are both teachers -  and art. He has two grandchildren now, a 4 year old girl and 2 year old boy. He has worked for [employer] for 22 years and [employer] for 19 years. 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      Social Determinants of Health     Financial Resource Strain: Low Risk  (12/15/2023)    Financial Resource Strain     Within the past 12 months, have you or your family members you live with been unable to get utilities (heat, electricity) when it was really needed?: No   Food Insecurity: Low Risk  (12/15/2023)    Food Insecurity     Within the past 12 months, did you worry that your food would run out before you got money to buy more?: No     Within the past 12 months, did the food you bought just not last and you didn t have money to get more?: No   Transportation Needs: Low Risk  (12/15/2023)    Transportation Needs     Within the past 12 months, has lack of transportation kept you from medical appointments, getting your medicines, non-medical meetings or appointments, work, or from getting things that you  need?: No   Physical Activity: Not on file   Stress: Not on file   Social Connections: Not on file   Interpersonal Safety: Low Risk  (12/15/2023)    Interpersonal Safety     Do you feel physically and emotionally safe where you currently live?: Yes     Within the past 12 months, have you been hit, slapped, kicked or otherwise physically hurt by someone?: No     Within the past 12 months, have you been humiliated or emotionally abused in other ways by your partner or ex-partner?: No   Housing Stability: Low Risk  (12/15/2023)    Housing Stability     Do you have housing? : Yes     Are you worried about losing your housing?: No       FAMILY HISTORY:                   Family History   Problem Relation Age of Onset    Hypertension Mother     Hyperlipidemia Mother     Hypertension Maternal Aunt     Hypertension Maternal Uncle     Hypertension Maternal Grandmother     Breast Cancer Maternal Grandmother     Hypertension Maternal Grandfather            Physical exam was not undertaken as this was a billable telephone encounter.                All relevant labs and imaging reviewed by myself on today's date.

## 2024-10-02 ENCOUNTER — TELEPHONE (OUTPATIENT)
Dept: OTHER | Facility: CLINIC | Age: 59
End: 2024-10-02
Payer: COMMERCIAL

## 2024-10-02 DIAGNOSIS — I80.01 THROMBOPHLEBITIS OF SUPERFICIAL VEINS OF RIGHT LOWER EXTREMITY: Primary | ICD-10-CM

## 2024-10-02 NOTE — TELEPHONE ENCOUNTER
Routing to scheduling to coordinate the following:    Non-fasting labs   Follow up 2 days after labs virtually  Please schedule this at next available     Appt note: Follow up to 9/6/24; labs needed prior      Sonam Benz MA

## 2024-10-08 ENCOUNTER — LAB (OUTPATIENT)
Dept: LAB | Facility: CLINIC | Age: 59
End: 2024-10-08
Payer: COMMERCIAL

## 2024-10-08 DIAGNOSIS — I80.01 THROMBOPHLEBITIS OF SUPERFICIAL VEINS OF RIGHT LOWER EXTREMITY: ICD-10-CM

## 2024-10-08 LAB — D DIMER PPP FEU-MCNC: 0.41 UG/ML FEU (ref 0–0.5)

## 2024-10-08 PROCEDURE — 85379 FIBRIN DEGRADATION QUANT: CPT

## 2024-10-08 PROCEDURE — 36415 COLL VENOUS BLD VENIPUNCTURE: CPT

## 2024-10-10 ENCOUNTER — VIRTUAL VISIT (OUTPATIENT)
Dept: OTHER | Facility: CLINIC | Age: 59
End: 2024-10-10
Attending: INTERNAL MEDICINE
Payer: COMMERCIAL

## 2024-10-10 DIAGNOSIS — I80.01 THROMBOPHLEBITIS OF SUPERFICIAL VEINS OF RIGHT LOWER EXTREMITY: ICD-10-CM

## 2024-10-10 PROCEDURE — 99443 PR PHYSICIAN TELEPHONE EVALUATION 21-30 MIN: CPT | Mod: 93 | Performed by: INTERNAL MEDICINE

## 2024-10-10 NOTE — PROGRESS NOTES
VASCULAR MEDICINE FOLLOW UP VISIT                 A/P:     (I80.01) Thrombophlebitis of superficial veins of right lower extremity, worsened when not on AC, with now discovered Leiden heterozygosity, and a rash coincidentally occurring after starting Xarelto.      Comment: I informed him that it is not regularly recommended to undertake a thrombophilia eval after a first VTE, and an SVT at that. He nonetheless has children and wanted to know for their sake. He is now known to be a Leiden heterozygote. This does not alter his management (he should be ACd with a DOAC or Lovenox/warfarin), but should cause him to inform his children to not smoke or use hormonal manipulation. Given anatomical extent of progression off of AC, he must be ACd, and with DVT treatment doses and for at least three months. He had a rash within 20-30 minutes of taking Xarelto on the first as well as the second day.       After discussing the above at length, he decided to retrial Xarelto and has remained on that without a rash.      He did not check a US Venous Competency Bilateral, D dimer Quantitative in 03/2024, or RTC one week later as advised to consider cessation of AC at that time. Reminder letters were sent on 1/25/24 and 2/27/24.       On 08/05/2024 he had a duplex revealing a short segment nonocclusive superficial thrombophlebitis involving a varicosity of the right thigh. His PCP suggested I follow up with him about this and we have arranged a virtual visit to that end. D dimer of 08/29/24 is undetectable. On 09/06/2024 we therefore stopped Xarelto. D dimer was rechecked on 10/8/24 and is normal one month after having stopped Xarelto.     Component      Latest Ref Rng 8/29/2024  2:15 PM 10/8/2024  1:28 PM   D-Dimer Quantitative      0.00 - 0.50 ug/mL FEU <0.27  0.41           Plan: Stay off of Xarelto, stop checking d dimer, Wear compression hosiery. RTC prn     21 minutes total medical care on today's date.     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 He did not take Xarelto as prescribed due to costs, and had sxs progress. This correlated with anatomical progression of SVT such that it then began to involve the thigh and calf of the RLE. The anatomical extent of thrombus exceeded 5 cm in size. He had not worn compression hosiery.  The patient presents today to address the above. He has children and wanted to know if he has a thrombophilia for their sake more so than his own. He does not smoke. There is not a FHx of VTE. This was his first lifetime VTE, no h/o DVT/PE. He works as a short . No antecedent long driving, flying, or immobilization.      On 11/29/23, I informed him  that it is not regularly recommended to undertake a thrombophilia eval after a first VTE, and an SVT at that. He nonetheless has children and wanted to know for their sake. We therefore checked a thrombophilia eval to include: Antithrombin III, Beta 2 Glycoprotein 1 Antibody IgG, Beta 2 Glycoprotein 1 Antibody IgM, Cardiolipin Dalia IgG and IgM, Factor 2 and 5 mutation analysis, Lupus Anticoagulant Panel, Protein C chromogenic, Protein S Antigen Free, immunofixation. The results were drawn prior to starting AC and are notable for the fact that he is a Leiden heterozygote. All other results were normal. Given the anatomical extent of progression off of AC, I advised he be ACd, and with DVT treatment doses and for at least three months. He started this on 12/04 /2023 and then sent us a [employer] message on 12/06/23 to state that broke out in an itchy rash on his chest, neck and head on 12/04 and 12/05. Also, his face was very flush and he felt dizzy. He used Benadryl both days and that helped very much but took several hours to go away. He had no airway issues, no tongue  swelling.      He presented for a video visit 12/07/2023 to discuss his thrombophilia results, his interval clinical response to two days AC, and to determine what to do about his sxs encountered after taking Xarelto as above.     I was not convinced this was due to Xarelto, but I could not be certain it is not due to Xarelto.      I advised he be ACd due to clot propagation when not ACd.      His options included:      1-retrialing Xarelto, accepting the fact that he may in fact be allergic to it, and continue it if he does not have a recurrent rash, but switch to Lovenox and warfarin if he does.      2-Do not resume Xarelto, and instead start Lovenox and warfarin and get set up for INR monitoring.       He should continue wear thigh high compression.      After discussing the above at length, he decided to retrial Xarelto and has remained on that without a rash.      He did not check a US Venous Competency Bilateral, D dimer Quantitative in 03/2024, or RTC one week later as advised to consider cessation of AC at that time. Reminder letters were sent on 1/25/24 and 2/27/24.       On 08/05/2024 he had a duplex revealing a short segment nonocclusive superficial thrombophlebitis involving a varicosity of the right thigh. His PCP suggested I follow up with him about this and we have arranged a virtual visit to that end. D dimer of 08/29/24 is undetectable.            Review Of Systems  Skin: negative  Eyes: negative  Ears/Nose/Throat: negative  Respiratory: No shortness of breath, dyspnea on exertion, cough, or hemoptysis  Cardiovascular: negative  Gastrointestinal: negative  Genitourinary: negative  Musculoskeletal: negative  Neurologic: negative  Psychiatric: negative  Hematologic/Lymphatic/Immunologic: negative  Endocrine:       PAST MEDICAL HISTORY:                  Past Medical History        Past Medical History:   Diagnosis Date    Anxiety      History of angina       March 2021, work up no treatment needed     HLD (hyperlipidemia)      HTN (hypertension)      Osteoarthritis of left hip 6/5/2017    Osteopenia              PAST SURGICAL HISTORY:                  Past Surgical History         Past Surgical History:   Procedure Laterality Date    COLONOSCOPY N/A 10/9/2023     Procedure: COLONOSCOPY, FLEXIBLE, WITH LESION REMOVAL USING SNARE;  Surgeon: Sean Little DO;  Location: [facility]    LAPAROSCOPIC HERNIORRHAPHY INGUINAL Left 4/22/2022     Procedure: HERNIORRHAPHY, INGUINAL, LAPAROSCOPIC;  Surgeon: Bruce Roldan MD;  Location: [facility] OR    ORTHOPEDIC SURGERY   08/07/2017, 07/15/2021     L Hip Rplacement, R Hip replacement    SEPTOPLASTY, TURBINOPLASTY, COMBINED N/A 3/29/2022     Procedure: SEPTOPLASTY, NOSE, WITH TURBINOPLASTY Ballon assisted with cryoabation of posterior nasal tissue;  Surgeon: Flavio Odonnell MD;  Location: [facility] OR    TOTAL HIP ARTHROPLASTY Left 08/2017     [facility]             CURRENT MEDICATIONS:                  Current Outpatient Prescriptions          Current Outpatient Medications   Medication Sig Dispense Refill    ALPRAZolam (XANAX) 0.25 MG tablet Take 1 tablet (0.25 mg) by mouth daily as needed for anxiety 30 tablet 3    atenolol (TENORMIN) 25 MG tablet Take 1 tablet (25 mg) by mouth daily 90 tablet 3    calcium carbonate-vitamin D3 (CALTRATE 600 PLUS D3) 600 mg(1,500mg) -400 unit per tablet [CALCIUM CARBONATE-VITAMIN D3 (CALTRATE 600 PLUS D3) 600 MG(1,500MG) -400 UNIT PER TABLET] Take 1 tablet by mouth daily.        eszopiclone (LUNESTA) 2 MG tablet Take 1 tablet (2 mg) by mouth at bedtime 90 tablet 1    multivitamin therapeutic tablet [MULTIVITAMIN THERAPEUTIC TABLET] Take 1 tablet by mouth daily.        omeprazole (PRILOSEC) 40 MG DR capsule Take 1 capsule (40 mg) by mouth daily 90 capsule 0    rivaroxaban ANTICOAGULANT (XARELTO) 20 MG TABS tablet Take 1 tablet (20 mg) by mouth daily (with dinner) 90 tablet 3    sildenafil (REVATIO) 20 MG tablet Take 0.5  tablets (10 mg) by mouth as needed (erectile dysfunction) 30 tablet 1    simvastatin (ZOCOR) 10 MG tablet TAKE 1 TABLET(10 MG) BY MOUTH AT BEDTIME 90 tablet 3    vitamin B-12 (CYANOCOBALAMIN) 100 MCG tablet Take 1,000 mcg by mouth daily        Rivaroxaban ANTICOAGULANT 15 & 20 MG TBPK Starter Therapy Pack Take 15 mg by mouth 2 times daily (with meals) for 21 days, THEN 20 mg daily with food for 9 days. 51 each 0            ALLERGIES:                  Allergies   No Known Allergies        SOCIAL HISTORY:                  Social History   Social History            Socioeconomic History    Marital status:        Spouse name: Not on file    Number of children: 2    Years of education: Not on file    Highest education level: Not on file   Occupational History    Not on file   Tobacco Use    Smoking status: Never       Passive exposure: Never    Smokeless tobacco: Former       Quit date: 6/1/1987   Vaping Use    Vaping status: Never Used   Substance and Sexual Activity    Alcohol use: Yes       Comment: 2-3 beers a week    Drug use: No    Sexual activity: Yes       Partners: Female       Birth control/protection: None   Other Topics Concern    Parent/sibling w/ CABG, MI or angioplasty before 65F 55M? No   Social History Narrative     His daughters are both teachers -  and art. He has two grandchildren now, a 4 year old girl and 2 year old boy. He has worked for [employer] for 22 years and [employer] for 19 years. 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       Social Determinants of Health           Financial Resource Strain: Low Risk  (12/15/2023)     Financial Resource Strain      Within the past 12 months, have you or your family members you live with been unable to get utilities (heat, electricity) when it was really needed?: No   Food Insecurity: Low Risk  (12/15/2023)     Food Insecurity      Within the past 12 months, did you worry that your food would run  out before you got money to buy more?: No      Within the past 12 months, did the food you bought just not last and you didn t have money to get more?: No   Transportation Needs: Low Risk  (12/15/2023)     Transportation Needs      Within the past 12 months, has lack of transportation kept you from medical appointments, getting your medicines, non-medical meetings or appointments, work, or from getting things that you need?: No   Physical Activity: Not on file   Stress: Not on file   Social Connections: Not on file   Interpersonal Safety: Low Risk  (12/15/2023)     Interpersonal Safety      Do you feel physically and emotionally safe where you currently live?: Yes      Within the past 12 months, have you been hit, slapped, kicked or otherwise physically hurt by someone?: No      Within the past 12 months, have you been humiliated or emotionally abused in other ways by your partner or ex-partner?: No   Housing Stability: Low Risk  (12/15/2023)     Housing Stability      Do you have housing? : Yes      Are you worried about losing your housing?: No            FAMILY HISTORY:                   Family History         Family History   Problem Relation Age of Onset    Hypertension Mother      Hyperlipidemia Mother      Hypertension Maternal Aunt      Hypertension Maternal Uncle      Hypertension Maternal Grandmother      Breast Cancer Maternal Grandmother      Hypertension Maternal Grandfather                 Physical exam was not undertaken as this was a billable telephone encounter.                   All relevant labs and imaging reviewed by myself on today's date.

## 2024-10-10 NOTE — PROGRESS NOTES
Elvis is a 59 year old who is being evaluated via a billable telephone visit.    What phone number would you like to be contacted at? [phone]     How would you like to obtain your AVS? Angela Candelaria MA

## 2024-10-22 SDOH — HEALTH STABILITY: PHYSICAL HEALTH: ON AVERAGE, HOW MANY DAYS PER WEEK DO YOU ENGAGE IN MODERATE TO STRENUOUS EXERCISE (LIKE A BRISK WALK)?: 3 DAYS

## 2024-10-22 SDOH — HEALTH STABILITY: PHYSICAL HEALTH: ON AVERAGE, HOW MANY MINUTES DO YOU ENGAGE IN EXERCISE AT THIS LEVEL?: 30 MIN

## 2024-10-22 ASSESSMENT — SOCIAL DETERMINANTS OF HEALTH (SDOH): HOW OFTEN DO YOU GET TOGETHER WITH FRIENDS OR RELATIVES?: TWICE A WEEK

## 2024-10-23 ENCOUNTER — OFFICE VISIT (OUTPATIENT)
Dept: FAMILY MEDICINE | Facility: CLINIC | Age: 59
End: 2024-10-23
Attending: FAMILY MEDICINE
Payer: COMMERCIAL

## 2024-10-23 VITALS
DIASTOLIC BLOOD PRESSURE: 74 MMHG | OXYGEN SATURATION: 95 % | RESPIRATION RATE: 16 BRPM | HEIGHT: 73 IN | WEIGHT: 186 LBS | TEMPERATURE: 97.7 F | SYSTOLIC BLOOD PRESSURE: 110 MMHG | HEART RATE: 65 BPM | BODY MASS INDEX: 24.65 KG/M2

## 2024-10-23 DIAGNOSIS — E78.5 HYPERLIPIDEMIA LDL GOAL <100: ICD-10-CM

## 2024-10-23 DIAGNOSIS — I10 ESSENTIAL HYPERTENSION: ICD-10-CM

## 2024-10-23 DIAGNOSIS — Z00.00 ROUTINE GENERAL MEDICAL EXAMINATION AT A HEALTH CARE FACILITY: Primary | ICD-10-CM

## 2024-10-23 DIAGNOSIS — K21.00 GASTROESOPHAGEAL REFLUX DISEASE WITH ESOPHAGITIS, UNSPECIFIED WHETHER HEMORRHAGE: ICD-10-CM

## 2024-10-23 DIAGNOSIS — G47.00 INSOMNIA, UNSPECIFIED TYPE: ICD-10-CM

## 2024-10-23 DIAGNOSIS — M85.80 OSTEOPENIA, UNSPECIFIED LOCATION: ICD-10-CM

## 2024-10-23 LAB
ALBUMIN SERPL BCG-MCNC: 4.3 G/DL (ref 3.5–5.2)
ALP SERPL-CCNC: 65 U/L (ref 40–150)
ALT SERPL W P-5'-P-CCNC: 22 U/L (ref 0–70)
ANION GAP SERPL CALCULATED.3IONS-SCNC: 7 MMOL/L (ref 7–15)
AST SERPL W P-5'-P-CCNC: 18 U/L (ref 0–45)
BASOPHILS # BLD AUTO: 0 10E3/UL (ref 0–0.2)
BASOPHILS NFR BLD AUTO: 1 %
BILIRUB SERPL-MCNC: 0.5 MG/DL
BUN SERPL-MCNC: 11.3 MG/DL (ref 6–20)
CALCIUM SERPL-MCNC: 9.7 MG/DL (ref 8.8–10.4)
CHLORIDE SERPL-SCNC: 103 MMOL/L (ref 98–107)
CREAT SERPL-MCNC: 0.87 MG/DL (ref 0.67–1.17)
EGFRCR SERPLBLD CKD-EPI 2021: >90 ML/MIN/1.73M2
EOSINOPHIL # BLD AUTO: 0.3 10E3/UL (ref 0–0.7)
EOSINOPHIL NFR BLD AUTO: 6 %
ERYTHROCYTE [DISTWIDTH] IN BLOOD BY AUTOMATED COUNT: 12.8 % (ref 10–15)
GLUCOSE SERPL-MCNC: 85 MG/DL (ref 70–99)
HCO3 SERPL-SCNC: 31 MMOL/L (ref 22–29)
HCT VFR BLD AUTO: 44 % (ref 40–53)
HGB BLD-MCNC: 14.5 G/DL (ref 13.3–17.7)
IMM GRANULOCYTES # BLD: 0 10E3/UL
IMM GRANULOCYTES NFR BLD: 0 %
LYMPHOCYTES # BLD AUTO: 1.5 10E3/UL (ref 0.8–5.3)
LYMPHOCYTES NFR BLD AUTO: 28 %
MCH RBC QN AUTO: 29.4 PG (ref 26.5–33)
MCHC RBC AUTO-ENTMCNC: 33 G/DL (ref 31.5–36.5)
MCV RBC AUTO: 89 FL (ref 78–100)
MONOCYTES # BLD AUTO: 0.6 10E3/UL (ref 0–1.3)
MONOCYTES NFR BLD AUTO: 12 %
NEUTROPHILS # BLD AUTO: 2.8 10E3/UL (ref 1.6–8.3)
NEUTROPHILS NFR BLD AUTO: 54 %
PLATELET # BLD AUTO: 198 10E3/UL (ref 150–450)
POTASSIUM SERPL-SCNC: 4 MMOL/L (ref 3.4–5.3)
PROT SERPL-MCNC: 6.7 G/DL (ref 6.4–8.3)
RBC # BLD AUTO: 4.93 10E6/UL (ref 4.4–5.9)
SODIUM SERPL-SCNC: 141 MMOL/L (ref 135–145)
TSH SERPL DL<=0.005 MIU/L-ACNC: 1.74 UIU/ML (ref 0.3–4.2)
VIT D+METAB SERPL-MCNC: 44 NG/ML (ref 20–50)
WBC # BLD AUTO: 5.3 10E3/UL (ref 4–11)

## 2024-10-23 PROCEDURE — 99214 OFFICE O/P EST MOD 30 MIN: CPT | Mod: 25 | Performed by: FAMILY MEDICINE

## 2024-10-23 PROCEDURE — 85025 COMPLETE CBC W/AUTO DIFF WBC: CPT | Performed by: FAMILY MEDICINE

## 2024-10-23 PROCEDURE — 90673 RIV3 VACCINE NO PRESERV IM: CPT | Performed by: FAMILY MEDICINE

## 2024-10-23 PROCEDURE — 84443 ASSAY THYROID STIM HORMONE: CPT | Performed by: FAMILY MEDICINE

## 2024-10-23 PROCEDURE — 99396 PREV VISIT EST AGE 40-64: CPT | Mod: 25 | Performed by: FAMILY MEDICINE

## 2024-10-23 PROCEDURE — 90471 IMMUNIZATION ADMIN: CPT | Performed by: FAMILY MEDICINE

## 2024-10-23 PROCEDURE — 36415 COLL VENOUS BLD VENIPUNCTURE: CPT | Performed by: FAMILY MEDICINE

## 2024-10-23 PROCEDURE — 80053 COMPREHEN METABOLIC PANEL: CPT | Performed by: FAMILY MEDICINE

## 2024-10-23 PROCEDURE — 82306 VITAMIN D 25 HYDROXY: CPT | Performed by: FAMILY MEDICINE

## 2024-10-23 RX ORDER — ESZOPICLONE 2 MG/1
2 TABLET, FILM COATED ORAL AT BEDTIME
Qty: 90 TABLET | Refills: 3 | Status: SHIPPED | OUTPATIENT
Start: 2024-10-23

## 2024-10-23 RX ORDER — SIMVASTATIN 10 MG
10 TABLET ORAL AT BEDTIME
Qty: 90 TABLET | Refills: 3 | Status: SHIPPED | OUTPATIENT
Start: 2024-10-23

## 2024-10-23 RX ORDER — ATENOLOL 25 MG/1
25 TABLET ORAL DAILY
Qty: 90 TABLET | Refills: 3 | Status: SHIPPED | OUTPATIENT
Start: 2024-10-23

## 2024-10-23 ASSESSMENT — PAIN SCALES - GENERAL: PAINLEVEL_OUTOF10: NO PAIN (0)

## 2024-10-23 NOTE — PROGRESS NOTES
Preventive Care Visit  [facility]  KASH BLANK DO, Family Medicine  Oct 23, 2024      Assessment & Plan     Routine general medical examination at a health care facility  Continue to be active.     Essential hypertension  Well-controlled, refills provided  - Comprehensive metabolic panel (BMP + Alb, Alk Phos, ALT, AST, Total. Bili, TP)  - atenolol (TENORMIN) 25 MG tablet  Dispense: 90 tablet; Refill: 3    Hyperlipidemia LDL goal <100  Stable  - simvastatin (ZOCOR) 10 MG tablet  Dispense: 90 tablet; Refill: 3    Gastroesophageal reflux disease with esophagitis, unspecified whether hemorrhage  We will switch to 20 mg twice daily from 40 mg daily to see if he gets better coverage.  Has been helping  - omeprazole (PRILOSEC) 20 MG DR capsule  Dispense: 180 capsule; Refill: 2    Osteopenia, unspecified location  DEXA scan stable, had been on bisphosphonates for over 5 years.  Plan to update blood work  - TSH with free T4 reflex  - Vitamin D Deficiency  - Comprehensive metabolic panel (BMP + Alb, Alk Phos, ALT, AST, Total. Bili, TP)  - CBC with platelets and differential    Insomnia, unspecified type  Stable, refills provided  - eszopiclone (LUNESTA) 2 MG tablet  Dispense: 90 tablet; Refill: 3      Patient has been advised of split billing requirements and indicates understanding: Yes        Counseling  Appropriate preventive services were addressed with this patient via screening, questionnaire, or discussion as appropriate for fall prevention, nutrition, physical activity, Tobacco-use cessation, social engagement, weight loss and cognition.  Checklist reviewing preventive services available has been given to the patient.  Reviewed patient's diet, addressing concerns and/or questions.   He is at risk for lack of exercise and has been provided with information to increase physical activity for the benefit of his well-being.   The patient reports drinking more than 3 alcoholic drinks per day  and/or more than 7 drhnks per week. The patient was counseled and given information about possible harmful effects of excessive alcohol intake.        Subjective   Elvis is a 59 year old, presenting for the following:  Physical        10/23/2024     7:20 AM   Additional Questions   Roomed by Jennifer VIVAR   Accompanied by self        HPI  Health Care Directive  Patient does not have a Health Care Directive: Discussed advance care planning with patient; information given to patient to review.    Bilateral hip replaced-right hip has been sore, aches after very strenuous day pushing, lifting.       10/22/2024   General Health   How would you rate your overall physical health? (!) FAIR   Feel stress (tense, anxious, or unable to sleep) To some extent      (!) STRESS CONCERN      10/22/2024   Nutrition   Three or more servings of calcium each day? Yes   Diet: Regular (no restrictions)   How many servings of fruit and vegetables per day? (!) 2-3   How many sweetened beverages each day? 0-1            10/22/2024   Exercise   Days per week of moderate/strenous exercise 3 days   Average minutes spent exercising at this level 30 min          10/22/2024   Social Factors   Frequency of gathering with friends or relatives Twice a week   Worry food won't last until get money to buy more No   Food not last or not have enough money for food? No   Do you have housing? (Housing is defined as stable permanent housing and does not include staying ouside in a car, in a tent, in an abandoned building, in an overnight shelter, or couch-surfing.) Yes   Are you worried about losing your housing? No   Lack of transportation? No   Unable to get utilities (heat,electricity)? No          10/22/2024   Fall Risk   Fallen 2 or more times in the past year? No    Trouble with walking or balance? No        Patient-reported          10/22/2024   Dental   Dentist two times every year? Yes            10/22/2024   TB Screening   Were you born outside of the [place]? No          10/22/2024   Substance Use   Alcohol more than 3/day or more than 7/wk Yes   How often do you have a drink containing alcohol 2 to 3 times a week   How many alcohol drinks on typical day 3 or 4   How often do you have 5+ drinks at one occasion Less than monthly   Audit 2/3 Score 2   How often not able to stop drinking once started Never   How often failed to do what normally expected Never   How often needed first drink in am after a heavy drinking session Never   How often feeling of guilt or remorse after drinking Less than monthly   How often unable to remember what happened the night before Never   Have you or someone else been injured because of your drinking No   Has anyone been concerned or suggested you cut down on drinking No   TOTAL SCORE - AUDIT 6   Do you use any other substances recreationally? No      Social History     Tobacco Use    Smoking status: Never     Passive exposure: Never    Smokeless tobacco: Former     Quit date: 6/1/1987   Vaping Use    Vaping status: Never Used   Substance Use Topics    Alcohol use: Yes     Comment: 2-3 beers a week    Drug use: No         10/22/2024   STI Screening   New sexual partner(s) since last STI/HIV test? No      Last PSA:   Prostate Specific Antigen Screen   Date Value Ref Range Status   07/24/2024 2.14 0.00 - 3.50 ng/mL Final   10/12/2017 1.8 0.0 - 3.5 ng/mL Final     ASCVD Risk   The 10-year ASCVD risk score (Stephanie BLAKELY, et al., 2019) is: 4.7%    Values used to calculate the score:      Age: 59 years      Sex: Male      Is Non- : No      Diabetic: No      Tobacco smoker: No      Systolic Blood Pressure: 110 mmHg      Is BP treated: Yes      HDL Cholesterol: 59 mg/dL      Total Cholesterol: 163 mg/dL    Reviewed and updated as needed this visit by Provider                    Past Medical History:   Diagnosis Date    Anxiety     History of angina     March 2021, work up no treatment needed    HLD (hyperlipidemia)     HTN  "(hypertension)     Osteoarthritis of left hip 06/05/2017    Osteopenia     Superficial vein thrombosis      Past Surgical History:   Procedure Laterality Date    COLONOSCOPY N/A 10/9/2023    Procedure: COLONOSCOPY, FLEXIBLE, WITH LESION REMOVAL USING SNARE;  Surgeon: Sean Little DO;  Location: [facility]    LAPAROSCOPIC HERNIORRHAPHY INGUINAL Left 4/22/2022    Procedure: HERNIORRHAPHY, INGUINAL, LAPAROSCOPIC;  Surgeon: Bruce Roldan MD;  Location: [facility] OR    ORTHOPEDIC SURGERY  08/07/2017, 07/15/2021    L Hip Rplacement, R Hip replacement    SEPTOPLASTY, TURBINOPLASTY, COMBINED N/A 3/29/2022    Procedure: SEPTOPLASTY, NOSE, WITH TURBINOPLASTY Ballon assisted with cryoabation of posterior nasal tissue;  Surgeon: Flavio Odonnell MD;  Location: [facility] OR    TOTAL HIP ARTHROPLASTY Left 08/2017    [facility]      Review of Systems  Constitutional, HEENT, cardiovascular, pulmonary, gi and gu systems are negative, except as otherwise noted.     Objective    Exam  /74   Pulse 65   Temp 97.7  F (36.5  C) (Tympanic)   Resp 16   Ht 1.854 m (6' 1\")   Wt 84.4 kg (186 lb)   SpO2 95%   BMI 24.54 kg/m     Estimated body mass index is 24.54 kg/m  as calculated from the following:    Height as of this encounter: 1.854 m (6' 1\").    Weight as of this encounter: 84.4 kg (186 lb).    Physical Exam  Constitutional:       Appearance: Normal appearance.   HENT:      Head: Normocephalic.      Right Ear: Tympanic membrane normal.      Left Ear: Tympanic membrane normal.   Eyes:      Conjunctiva/sclera: Conjunctivae normal.   Cardiovascular:      Rate and Rhythm: Normal rate and regular rhythm.      Pulses: Normal pulses.   Pulmonary:      Effort: Pulmonary effort is normal.      Breath sounds: Normal breath sounds.   Abdominal:      General: Bowel sounds are normal.   Musculoskeletal:      Right lower leg: No edema.      Left lower leg: No edema.   Skin:     General: Skin is warm and dry. "   Neurological:      General: No focal deficit present.      Mental Status: He is alert.   Psychiatric:         Thought Content: Thought content normal.         Judgment: Judgment normal.           Signed Electronically by: KASH BLANK DO

## 2024-10-23 NOTE — PATIENT INSTRUCTIONS
Cereve - tub   Vanicream     Patient Education   Preventive Care Advice   This is general advice given by our system to help you stay healthy. However, your care team may have specific advice just for you. Please talk to your care team about your preventive care needs.  Nutrition  Eat 5 or more servings of fruits and vegetables each day.  Try wheat bread, brown rice and whole grain pasta (instead of white bread, rice, and pasta).  Get enough calcium and vitamin D. Check the label on foods and aim for 100% of the RDA (recommended daily allowance).  Lifestyle  Exercise at least 150 minutes each week  (30 minutes a day, 5 days a week).  Do muscle strengthening activities 2 days a week. These help control your weight and prevent disease.  No smoking.  Wear sunscreen to prevent skin cancer.  Have a dental exam and cleaning every 6 months.  Yearly exams  See your health care team every year to talk about:  Any changes in your health.  Any medicines your care team has prescribed.  Preventive care, family planning, and ways to prevent chronic diseases.  Shots (vaccines)   HPV shots (up to age 26), if you've never had them before.  Hepatitis B shots (up to age 59), if you've never had them before.  COVID-19 shot: Get this shot when it's due.  Flu shot: Get a flu shot every year.  Tetanus shot: Get a tetanus shot every 10 years.  Pneumococcal, hepatitis A, and RSV shots: Ask your care team if you need these based on your risk.  Shingles shot (for age 50 and up)  General health tests  Diabetes screening:  Starting at age 35, Get screened for diabetes at least every 3 years.  If you are younger than age 35, ask your care team if you should be screened for diabetes.  Cholesterol test: At age 39, start having a cholesterol test every 5 years, or more often if advised.  Bone density scan (DEXA): At age 50, ask your care team if you should have this scan for osteoporosis (brittle bones).  Hepatitis C: Get tested at least once in  your life.  STIs (sexually transmitted infections)  Before age 24: Ask your care team if you should be screened for STIs.  After age 24: Get screened for STIs if you're at risk. You are at risk for STIs (including HIV) if:  You are sexually active with more than one person.  You don't use condoms every time.  You or a partner was diagnosed with a sexually transmitted infection.  If you are at risk for HIV, ask about PrEP medicine to prevent HIV.  Get tested for HIV at least once in your life, whether you are at risk for HIV or not.  Cancer screening tests  Cervical cancer screening: If you have a cervix, begin getting regular cervical cancer screening tests starting at age 21.  Breast cancer scan (mammogram): If you've ever had breasts, begin having regular mammograms starting at age 40. This is a scan to check for breast cancer.  Colon cancer screening: It is important to start screening for colon cancer at age 45.  Have a colonoscopy test every 10 years (or more often if you're at risk) Or, ask your provider about stool tests like a FIT test every year or Cologuard test every 3 years.  To learn more about your testing options, visit:   .  For help making a decision, visit:   https://bit.ly/pn66629.  Prostate cancer screening test: If you have a prostate, ask your care team if a prostate cancer screening test (PSA) at age 55 is right for you.  Lung cancer screening: If you are a current or former smoker ages 50 to 80, ask your care team if ongoing lung cancer screenings are right for you.  For informational purposes only. Not to replace the advice of your health care provider. Copyright   2023 [facility]. All rights reserved. Clinically reviewed by the [facility] Transitions [facility]. Tink 979300 - REV 01/24.  9 Ways to Cut Back on Drinking  Maybe you've found yourself drinking more alcohol than you'd prefer. If you want to cut back, here are some ideas to try.    Think before you drink.   "Do you really want a drink, or is it just a habit? If you're used to having a drink at a certain time, try doing something else then.     Look for substitutes.  Find some no-alcohol drinks that you enjoy, like flavored seltzer water, tea with honey, or tonic with a slice of lime. Or try alcohol-free beer or \"virgin\" cocktails (without the alcohol).     Drink more water.  Use water to quench your thirst. Drink a glass of water before you have any alcohol. Have another glass along with every drink or between drinks.     Shrink your drink.  For example, have a bottle of beer instead of a pint. Use a smaller glass for wine. Choose drinks with lower alcohol content (ABV%). Or use less liquor and more mixer in cocktails.     Slow down.  It's easy to drink quickly and without thinking about it. Pay attention, and make each drink last longer.     Do the math.  Total up how much you spend on alcohol each month. How much is that a year? If you cut back, what could you do with the money you save?     Take a break.  Choose a day or two each week when you won't drink at all. Notice how you feel on those days, physically and emotionally. How did you sleep? Do you feel better? Over time, add more break days.     Count calories.  Would you like to lose some weight? For some people that's a good motivator for cutting back. Figure out how many calories are in each drink. How many does that add up to in a day? In a week? In a month?     Practice saying no.  Be ready when someone offers you a drink. Try: \"Thanks, I've had enough.\" Or \"Thanks, but I'm cutting back.\" Or \"No, thanks. I feel better when I drink less.\"   Current as of: November 15, 2023  Content Version: 14.2 2024 [employer].   Care instructions adapted under license by your healthcare professional. If you have questions about a medical condition or this instruction, always ask your healthcare professional. [employer] disclaims any warranty or " liability for your use of this information.

## 2024-10-30 ENCOUNTER — MYC MEDICAL ADVICE (OUTPATIENT)
Dept: FAMILY MEDICINE | Facility: CLINIC | Age: 59
End: 2024-10-30
Payer: COMMERCIAL

## 2025-02-05 ENCOUNTER — OFFICE VISIT (OUTPATIENT)
Dept: DERMATOLOGY | Facility: CLINIC | Age: 60
End: 2025-02-05
Payer: COMMERCIAL

## 2025-02-05 DIAGNOSIS — L81.4 LENTIGO: ICD-10-CM

## 2025-02-05 DIAGNOSIS — D48.5 NEOPLASM OF UNCERTAIN BEHAVIOR OF SKIN: ICD-10-CM

## 2025-02-05 DIAGNOSIS — L30.8 ASTEATOTIC DERMATITIS: ICD-10-CM

## 2025-02-05 DIAGNOSIS — D18.01 ANGIOMA OF SKIN: ICD-10-CM

## 2025-02-05 DIAGNOSIS — L81.6 POIKILODERMA: ICD-10-CM

## 2025-02-05 DIAGNOSIS — L82.1 SEBORRHEIC KERATOSES: ICD-10-CM

## 2025-02-05 DIAGNOSIS — D23.9 DERMAL NEVUS: Primary | ICD-10-CM

## 2025-02-05 PROCEDURE — 11102 TANGNTL BX SKIN SINGLE LES: CPT | Performed by: DERMATOLOGY

## 2025-02-05 PROCEDURE — 99203 OFFICE O/P NEW LOW 30 MIN: CPT | Mod: 25 | Performed by: DERMATOLOGY

## 2025-02-05 RX ORDER — FLUOCINONIDE 0.5 MG/G
CREAM TOPICAL 2 TIMES DAILY
Qty: 120 G | Refills: 6 | Status: SHIPPED | OUTPATIENT
Start: 2025-02-05

## 2025-02-05 ASSESSMENT — PAIN SCALES - GENERAL: PAINLEVEL_OUTOF10: NO PAIN (0)

## 2025-02-05 NOTE — NURSING NOTE
Chief Complaint   Patient presents with    Skin Check     Spot on left lower lip       There were no vitals filed for this visit.  Wt Readings from Last 1 Encounters:   10/23/24 84.4 kg (186 lb)       Pita Ge LPN .................2/5/2025

## 2025-02-05 NOTE — LETTER
2/5/2025      Brennan Vazquez  [address]  [place] [id number]      Dear Colleague,    Thank you for referring your patient, Brennan Vazquez, to the [facility]. Please see a copy of my visit note below.    Brennan Vazquez is an extremely pleasant 59 year old year old male patient here today for spots on skin.  He notes growing spot on lip and rash on arms.  Patient has no other skin complaints today.  Remainder of the HPI, Meds, PMH, Allergies, FH, and SH was reviewed in chart.      Past Medical History:   Diagnosis Date     Anxiety      History of angina     March 2021, work up no treatment needed     HLD (hyperlipidemia)      HTN (hypertension)      Osteoarthritis of left hip 06/05/2017     Osteopenia      Superficial vein thrombosis        Past Surgical History:   Procedure Laterality Date     COLONOSCOPY N/A 10/9/2023    Procedure: COLONOSCOPY, FLEXIBLE, WITH LESION REMOVAL USING SNARE;  Surgeon: Sean Little DO;  Location: [facility]     LAPAROSCOPIC HERNIORRHAPHY INGUINAL Left 4/22/2022    Procedure: HERNIORRHAPHY, INGUINAL, LAPAROSCOPIC;  Surgeon: Bruce Roldan MD;  Location: [facility]     ORTHOPEDIC SURGERY  08/07/2017, 07/15/2021    L Hip Rplacement, R Hip replacement     SEPTOPLASTY, TURBINOPLASTY, COMBINED N/A 3/29/2022    Procedure: SEPTOPLASTY, NOSE, WITH TURBINOPLASTY [place] assisted with cryoabation of posterior nasal tissue;  Surgeon: Flavio Odonnell MD;  Location: [facility]     TOTAL HIP ARTHROPLASTY Left 08/2017    [facility]         Family History   Problem Relation Age of Onset     Hypertension Mother      Hyperlipidemia Mother      Hypertension Maternal Aunt      Hypertension Maternal Uncle      Hypertension Maternal Grandmother      Breast Cancer Maternal Grandmother      Hypertension Maternal Grandfather        Social History     Socioeconomic History     Marital status:      Spouse name: Not on file     Number of  children: 2     Years of education: Not on file     Highest education level: Not on file   Occupational History     Not on file   Tobacco Use     Smoking status: Never     Passive exposure: Never     Smokeless tobacco: Former     Quit date: 6/1/1987   Vaping Use     Vaping status: Never Used   Substance and Sexual Activity     Alcohol use: Yes     Comment: 2-3 beers a week     Drug use: No     Sexual activity: Yes     Partners: Female     Birth control/protection: None   Other Topics Concern     Parent/sibling w/ CABG, MI or angioplasty before 65F 55M? No   Social History Narrative    His daughters are both teachers -  and art. He has two grandchildren now, a 4 year old girl and 2 year old boy. He has worked for [employer] for 22 years and [employer] for 19 years. 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      Social Drivers of Health     Financial Resource Strain: Low Risk  (10/22/2024)    Financial Resource Strain      Within the past 12 months, have you or your family members you live with been unable to get utilities (heat, electricity) when it was really needed?: No   Food Insecurity: Low Risk  (10/22/2024)    Food Insecurity      Within the past 12 months, did you worry that your food would run out before you got money to buy more?: No      Within the past 12 months, did the food you bought just not last and you didn t have money to get more?: No   Transportation Needs: Low Risk  (10/22/2024)    Transportation Needs      Within the past 12 months, has lack of transportation kept you from medical appointments, getting your medicines, non-medical meetings or appointments, work, or from getting things that you need?: No   Physical Activity: Insufficiently Active (10/22/2024)    Exercise Vital Sign      Days of Exercise per Week: 3 days      Minutes of Exercise per Session: 30 min   Stress: Stress Concern Present (10/22/2024)    Citizen of [place] of Occupational  Health - Occupational Stress Questionnaire      Feeling of Stress : To some extent   Social Connections: Unknown (10/22/2024)    Social Connection and Isolation Panel [NHANES]      Frequency of Communication with Friends and Family: Not on file      Frequency of Social Gatherings with Friends and Family: Twice a week      Attends [employer] Services: Not on file      Active Member of Clubs or Organizations: Not on file      Attends Club or Organization Meetings: Not on file      Marital Status: Not on file   Interpersonal Safety: Low Risk  (10/23/2024)    Interpersonal Safety      Do you feel physically and emotionally safe where you currently live?: Yes      Within the past 12 months, have you been hit, slapped, kicked or otherwise physically hurt by someone?: No      Within the past 12 months, have you been humiliated or emotionally abused in other ways by your partner or ex-partner?: No   Housing Stability: Low Risk  (10/22/2024)    Housing Stability      Do you have housing? : Yes      Are you worried about losing your housing?: No       Outpatient Encounter Medications as of 2/5/2025   Medication Sig Dispense Refill     ALPRAZolam (XANAX) 0.25 MG tablet Take 1 tablet (0.25 mg) by mouth daily as needed for anxiety 30 tablet 3     atenolol (TENORMIN) 25 MG tablet Take 1 tablet (25 mg) by mouth daily. 90 tablet 3     calcium carbonate-vitamin D3 (CALTRATE 600 PLUS D3) 600 mg(1,500mg) -400 unit per tablet [CALCIUM CARBONATE-VITAMIN D3 (CALTRATE 600 PLUS D3) 600 MG(1,500MG) -400 UNIT PER TABLET] Take 1 tablet by mouth daily.       eszopiclone (LUNESTA) 2 MG tablet Take 1 tablet (2 mg) by mouth at bedtime. 90 tablet 3     multivitamin therapeutic tablet [MULTIVITAMIN THERAPEUTIC TABLET] Take 1 tablet by mouth daily.       omeprazole (PRILOSEC) 20 MG DR capsule Take 1 capsule (20 mg) by mouth 2 times daily. 180 capsule 2     sildenafil (REVATIO) 20 MG tablet Take 0.5 tablets (10 mg) by mouth as needed (erectile  dysfunction) 30 tablet 1     simvastatin (ZOCOR) 10 MG tablet Take 1 tablet (10 mg) by mouth at bedtime. 90 tablet 3     vitamin B-12 (CYANOCOBALAMIN) 100 MCG tablet Take 1,000 mcg by mouth daily       No facility-administered encounter medications on file as of 2/5/2025.             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   Stuck on papules and brown macules on trunk and ext   Red papules on trunk  Flesh colored papules on trunk     The remainder of the full exam was normal; the following areas were examined:  conjunctiva/lids, , neck, peripheral vascular system, abdomen, lymph nodes, digits/nails, eccrine and apocrine glands, scalp/hair, face, neck, chest, abdomen, buttocks, back, RUE, LUE, RLE, LLE       Eyes: Conjunctivae/lids:Normal     ENT: Lips, mucosa: normal    MSK:Normal    Cardiovascular: peripheral edema none    Pulm: Breathing Normal    Lymph Nodes: No Head and Neck Lymphadenopathy     Neuro/Psych: Orientation:Alert and Orientedx3 ; Mood/Affect:normal       A/P:  1. Seborrheic keratosis, lentigo, angioma, dermal nevus  2. Asteatotic derm   Skin care discussed with patient   Lidex prn   3. Poikiloderma  Ipl discussed with patient   4. L lower mucosal lip r/o melanotic macule v nevus   TANGENTIAL BIOPSY SENT OUT:  After consent, anesthesia with LEC and prep, tangential excision performed and specimen sent out for permanent section histology.  No complications and routine wound care. Patient told to call our office in 1-2 weeks for result.       It was a pleasure speaking to Brennan Vazquez today.  Previous clinic notes and pertinent laboratory tests were reviewed prior to Brennan Vazquez's visit.  Patient encouraged to perform monthly skin exams.  UV precautions reviewed with patient.  Return to clinic  pending path       Again, thank you for allowing me to participate in the care of your patient.        Sincerely,        Masoud Sainz MD    Electronically signed

## 2025-02-05 NOTE — PATIENT INSTRUCTIONS
Wound Care Instructions   FOR SUPERFICIAL WOUNDS   [facility] [phone]    AFTER 24 HOURS YOU SHOULD REMOVE THE BANDAGE AND BEGIN DAILY DRESSING CHANGES AS FOLLOWS:     1) Remove Dressing.     2) Clean and dry the area with tap water using a Q-tip or sterile gauze pad.     3) Apply Vaseline, Aquaphor, Polysporin ointment or Bacitracin ointment over entire wound.  Do NOT use Neosporin ointment.     4) Cover the wound with a band-aid, or a sterile non-stick gauze pad and micropore paper tape      REPEAT THESE INSTRUCTIONS AT LEAST ONCE A DAY UNTIL THE WOUND HAS COMPLETELY HEALED.    It is an old wives tale that a wound heals better when it is exposed to air and allowed to dry out. The wound will heal faster with a better cosmetic result if it is kept moist with ointment and covered with a bandage.    **Do not let the wound dry out.**  Supplies Needed:    *Cotton tipped applicators (Q-tips)    *Polysporin Ointment or Bacitracin Ointment (NOT NEOSPORIN)    *Band-aids or non-stick gauze pads and micropore paper tape.      PATIENT INFORMATION:    During the healing process you will notice a number of changes. All wounds develop a small halo of redness surrounding the wound.  This means healing is occurring. Severe itching with extensive redness usually indicates sensitivity to the ointment or bandage tape used to dress the wound.  You should call our office if this develops.      Swelling  and/or discoloration around your surgical site is common, particularly when performed around the eye.    All wounds normally drain.  The larger the wound the more drainage there will be.  After 7-10 days, you will notice the wound beginning to shrink and new skin will begin to grow.  The wound is healed when you can see skin has formed over the entire area.  A healed wound has a healthy, shiny look to the surface and is red to dark pink in color to normalize.  Wounds may take approximately 4-6  weeks to heal.  Larger wounds may take 6-8 weeks.  After the wound is healed you may discontinue dressing changes.    You may experience a sensation of tightness as your wound heals. This is normal and will gradually subside.    Your healed wound may be sensitive to temperature changes. This sensitivity improves with time, but if you re having a lot of discomfort, try to avoid temperature extremes.    Patients frequently experience itching after their wound appears to have healed because of the continue healing under the skin.  Plain Vaseline will help relieve the itching.    POSSIBLE COMPLICATIONS    BLEEDING:    Leave the bandage in place.  Use tightly rolled up gauze or a cloth to apply direct pressure over the bandage for 30  minutes.  Reapply pressure for an additional 30 minutes if necessary  Use additional gauze and tape to maintain pressure once the bleeding has stopped.       Proper skin care from [facility]:    -Eliminate harsh soaps as they strip the natural oils from the skin, often resulting in dry itchy skin ( i.e. Dial, Zest, Gabonese Spring)  -Use mild soaps such as Cetaphil or Dove Sensitive Skin in the shower. You do not need to use soap on arms, legs, and trunk every time you shower unless visibly soiled.   -Avoid hot or cold showers.  -After showering, lightly dry off and apply moisturizing within 2-3 minutes. This will help trap moisture in the skin.   -Aggressive use of a moisturizer at least 1-2 times a day to the entire body (including -Vanicream, Cetaphil, Aquaphor or Cerave) and moisturize hands after every washing.  -We recommend using moisturizers that come in a tub that needs to be scooped out, not a pump. This has more of an oil base. It will hold moisture in your skin much better than a water base moisturizer. The above recommended are non-pore clogging.      Wear a sunscreen with at least SPF 30 on your face, ears, neck and V of the chest daily. Wear sunscreen on other areas of  the body if those areas are exposed to the sun throughout the day. Sunscreens can contain physical and/or chemical blockers. Physical blockers are less likely to clog pores, these include zinc oxide and titanium dioxide. Reapply every two hour and after swimming.     Sunscreen examples: https://www.ewg.org/sunscreen/    UV radiation  UVA radiation remains constant throughout the day and throughout the year. It is a longer wavelength than UVB and therefore penetrates deeper into the skin leading to immediate and delayed tanning, photoaging, and skin cancer. 70-80% of UVA and UVB radiation occurs between the hours of 10am-2pm.  UVB radiation  UVB radiation causes the most harmful effects and is more significant during the summer months. However, snow and ice can reflect UVB radiation leading to skin damage during the winter months as well. UVB radiation is responsible for tanning, burning, inflammation, delayed erythema (pinkness), pigmentation (brown spots), and skin cancer.     I recommend self monthly full body exams and yearly full body exams with a dermatology provider. If you develop a new or changing lesion please follow up for examination. Most skin cancers are pink and scaly or pink and pearly. However, we do see blue/brown/black skin cancers.  Consider the ABCDEs of melanoma when giving yourself your monthly full body exam ( don't forget the groin, buttocks, feet, toes, etc). A-asymmetry, B-borders, C-color, D-diameter, E-elevation or evolving. If you see any of these changes please follow up in clinic. If you cannot see your back I recommend purchasing a hand held mirror to use with a larger wall mirror.       Checking for Skin Cancer  You can find cancer early by checking your skin each month. There are 3 kinds of skin cancer. They are melanoma, basal cell carcinoma, and squamous cell carcinoma. Doing monthly skin checks is the best way to find new marks or skin changes. Follow the instructions below for  checking your skin.   The ABCDEs of checking moles for melanoma   Check your moles or growths for signs of melanoma using ABCDE:   Asymmetry: the sides of the mole or growth don t match  Border: the edges are ragged, notched, or blurred  Color: the color within the mole or growth varies  Diameter: the mole or growth is larger than 6 mm (size of a pencil eraser)  Evolving: the size, shape, or color of the mole or growth is changing (evolving is not shown in the images below)    Checking for other types of skin cancer  Basal cell carcinoma or squamous cell carcinoma have symptoms such as:     A spot or mole that looks different from all other marks on your skin  Changes in how an area feels, such as itching, tenderness, or pain  Changes in the skin's surface, such as oozing, bleeding, or scaliness  A sore that does not heal  New swelling or redness beyond the border of a mole    Who s at risk?  Anyone can get skin cancer. But you are at greater risk if you have:   Fair skin, light-colored hair, or light-colored eyes  Many moles or abnormal moles on your skin  A history of sunburns from sunlight or tanning beds  A family history of skin cancer  A history of exposure to radiation or chemicals  A weakened immune system  If you have had skin cancer in the past, you are at risk for recurring skin cancer.   How to check your skin  Do your monthly skin checkups in front of a full-length mirror. Check all parts of your body, including your:   Head (ears, face, neck, and scalp)  Torso (front, back, and sides)  Arms (tops, undersides, upper, and lower armpits)  Hands (palms, backs, and fingers, including under the nails)  Buttocks and genitals  Legs (front, back, and sides)  Feet (tops, soles, toes, including under the nails, and between toes)  If you have a lot of moles, take digital photos of them each month. Make sure to take photos both up close and from a distance. These can help you see if any moles change over time.    Most skin changes are not cancer. But if you see any changes in your skin, call your doctor right away. Only he or she can diagnose a problem. If you have skin cancer, seeing your doctor can be the first step toward getting the treatment that could save your life.   [employer] last reviewed this educational content on 4/1/2019 2000-2020 [employer]. [address] [place] [address], [place] [address]. All rights reserved. This information is not intended as a substitute for professional medical care. Always follow your healthcare professional's instructions.       When should I call my doctor?  If you are worsening or not improving, please, contact us or seek urgent care as noted below.     Who should I call with questions (adults)?    [facility] [phone]  For urgent needs outside of business hours call the [facility] at [phone] and ask for the dermatology resident on call to be paged  If this is a medical emergency and you are unable to reach an ER, Call [phone]      If you need a prescription refill, please contact your pharmacy. Refills are approved or denied by our Physicians during normal business hours, Monday through Friday.  Per office policy, refills will not be granted if you have not been seen within the past year (or sooner depending on the condition).

## 2025-02-05 NOTE — PROGRESS NOTES
Brennan Vazquez is an extremely pleasant 59 year old year old male patient here today for spots on skin.  He notes growing spot on lip and rash on arms.  Patient has no other skin complaints today.  Remainder of the HPI, Meds, PMH, Allergies, FH, and SH was reviewed in chart.      Past Medical History:   Diagnosis Date    Anxiety     History of angina     March 2021, work up no treatment needed    HLD (hyperlipidemia)     HTN (hypertension)     Osteoarthritis of left hip 06/05/2017    Osteopenia     Superficial vein thrombosis        Past Surgical History:   Procedure Laterality Date    COLONOSCOPY N/A 10/9/2023    Procedure: COLONOSCOPY, FLEXIBLE, WITH LESION REMOVAL USING SNARE;  Surgeon: Sean Little DO;  Location: [facility]    LAPAROSCOPIC HERNIORRHAPHY INGUINAL Left 4/22/2022    Procedure: HERNIORRHAPHY, INGUINAL, LAPAROSCOPIC;  Surgeon: Bruce Roldan MD;  Location: [facility] OR    ORTHOPEDIC SURGERY  08/07/2017, 07/15/2021    L Hip Rplacement, R Hip replacement    SEPTOPLASTY, TURBINOPLASTY, COMBINED N/A 3/29/2022    Procedure: SEPTOPLASTY, NOSE, WITH TURBINOPLASTY [place] assisted with cryoabation of posterior nasal tissue;  Surgeon: Flavio Odonnell MD;  Location: [facility] OR    TOTAL HIP ARTHROPLASTY Left 08/2017    [facility]         Family History   Problem Relation Age of Onset    Hypertension Mother     Hyperlipidemia Mother     Hypertension Maternal Aunt     Hypertension Maternal Uncle     Hypertension Maternal Grandmother     Breast Cancer Maternal Grandmother     Hypertension Maternal Grandfather        Social History     Socioeconomic History    Marital status:      Spouse name: Not on file    Number of children: 2    Years of education: Not on file    Highest education level: Not on file   Occupational History    Not on file   Tobacco Use    Smoking status: Never     Passive exposure: Never    Smokeless tobacco: Former     Quit date: 6/1/1987   Vaping Use    Vaping  status: Never Used   Substance and Sexual Activity    Alcohol use: Yes     Comment: 2-3 beers a week    Drug use: No    Sexual activity: Yes     Partners: Female     Birth control/protection: None   Other Topics Concern    Parent/sibling w/ CABG, MI or angioplasty before 65F 55M? No   Social History Narrative    His daughters are both teachers -  and art. He has two grandchildren now, a 4 year old girl and 2 year old boy. He has worked for [employer] for 22 years and [employer] for 19 years. 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      Social Drivers of Health     Financial Resource Strain: Low Risk  (10/22/2024)    Financial Resource Strain     Within the past 12 months, have you or your family members you live with been unable to get utilities (heat, electricity) when it was really needed?: No   Food Insecurity: Low Risk  (10/22/2024)    Food Insecurity     Within the past 12 months, did you worry that your food would run out before you got money to buy more?: No     Within the past 12 months, did the food you bought just not last and you didn t have money to get more?: No   Transportation Needs: Low Risk  (10/22/2024)    Transportation Needs     Within the past 12 months, has lack of transportation kept you from medical appointments, getting your medicines, non-medical meetings or appointments, work, or from getting things that you need?: No   Physical Activity: Insufficiently Active (10/22/2024)    Exercise Vital Sign     Days of Exercise per Week: 3 days     Minutes of Exercise per Session: 30 min   Stress: Stress Concern Present (10/22/2024)    [employer] - Occupational Stress Questionnaire     Feeling of Stress : To some extent   Social Connections: Unknown (10/22/2024)    Social Connection and Isolation Panel [NHANES]     Frequency of Communication with Friends and Family: Not on file     Frequency of Social Gatherings with  Friends and Family: Twice a week     Attends Restorationism Services: Not on file     Active Member of Clubs or Organizations: Not on file     Attends Club or Organization Meetings: Not on file     Marital Status: Not on file   Interpersonal Safety: Low Risk  (10/23/2024)    Interpersonal Safety     Do you feel physically and emotionally safe where you currently live?: Yes     Within the past 12 months, have you been hit, slapped, kicked or otherwise physically hurt by someone?: No     Within the past 12 months, have you been humiliated or emotionally abused in other ways by your partner or ex-partner?: No   Housing Stability: Low Risk  (10/22/2024)    Housing Stability     Do you have housing? : Yes     Are you worried about losing your housing?: No       Outpatient Encounter Medications as of 2/5/2025   Medication Sig Dispense Refill    ALPRAZolam (XANAX) 0.25 MG tablet Take 1 tablet (0.25 mg) by mouth daily as needed for anxiety 30 tablet 3    atenolol (TENORMIN) 25 MG tablet Take 1 tablet (25 mg) by mouth daily. 90 tablet 3    calcium carbonate-vitamin D3 (CALTRATE 600 PLUS D3) 600 mg(1,500mg) -400 unit per tablet [CALCIUM CARBONATE-VITAMIN D3 (CALTRATE 600 PLUS D3) 600 MG(1,500MG) -400 UNIT PER TABLET] Take 1 tablet by mouth daily.      eszopiclone (LUNESTA) 2 MG tablet Take 1 tablet (2 mg) by mouth at bedtime. 90 tablet 3    multivitamin therapeutic tablet [MULTIVITAMIN THERAPEUTIC TABLET] Take 1 tablet by mouth daily.      omeprazole (PRILOSEC) 20 MG DR capsule Take 1 capsule (20 mg) by mouth 2 times daily. 180 capsule 2    sildenafil (REVATIO) 20 MG tablet Take 0.5 tablets (10 mg) by mouth as needed (erectile dysfunction) 30 tablet 1    simvastatin (ZOCOR) 10 MG tablet Take 1 tablet (10 mg) by mouth at bedtime. 90 tablet 3    vitamin B-12 (CYANOCOBALAMIN) 100 MCG tablet Take 1,000 mcg by mouth daily       No facility-administered encounter medications on file as of 2/5/2025.             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   Stuck on papules and brown macules on trunk and ext   Red papules on trunk  Flesh colored papules on trunk     The remainder of the full exam was normal; the following areas were examined:  conjunctiva/lids, , neck, peripheral vascular system, abdomen, lymph nodes, digits/nails, eccrine and apocrine glands, scalp/hair, face, neck, chest, abdomen, buttocks, back, RUE, LUE, RLE, LLE       Eyes: Conjunctivae/lids:Normal     ENT: Lips, mucosa: normal    MSK:Normal    Cardiovascular: peripheral edema none    Pulm: Breathing Normal    Lymph Nodes: No Head and Neck Lymphadenopathy     Neuro/Psych: Orientation:Alert and Orientedx3 ; Mood/Affect:normal       A/P:  1. Seborrheic keratosis, lentigo, angioma, dermal nevus  2. Asteatotic derm   Skin care discussed with patient   Lidex prn   3. Poikiloderma  Ipl discussed with patient   4. L lower mucosal lip r/o melanotic macule v nevus   TANGENTIAL BIOPSY SENT OUT:  After consent, anesthesia with LEC and prep, tangential excision performed and specimen sent out for permanent section histology.  No complications and routine wound care. Patient told to call our office in 1-2 weeks for result.       It was a pleasure speaking to Brennan Vazquez today.  Previous clinic notes and pertinent laboratory tests were reviewed prior to Brennan Vazquez's visit.  Patient encouraged to perform monthly skin exams.  UV precautions reviewed with patient.  Return to clinic pending path

## 2025-02-08 LAB
PATH REPORT.COMMENTS IMP SPEC: NORMAL
PATH REPORT.COMMENTS IMP SPEC: NORMAL
PATH REPORT.FINAL DX SPEC: NORMAL
PATH REPORT.GROSS SPEC: NORMAL
PATH REPORT.MICROSCOPIC SPEC OTHER STN: NORMAL
PATH REPORT.RELEVANT HX SPEC: NORMAL

## 2025-05-12 ENCOUNTER — MYC REFILL (OUTPATIENT)
Dept: FAMILY MEDICINE | Facility: CLINIC | Age: 60
End: 2025-05-12
Payer: COMMERCIAL

## 2025-05-12 DIAGNOSIS — F41.9 ANXIETY: ICD-10-CM

## 2025-05-12 DIAGNOSIS — G47.00 INSOMNIA, UNSPECIFIED TYPE: ICD-10-CM

## 2025-05-12 RX ORDER — ESZOPICLONE 2 MG/1
2 TABLET, FILM COATED ORAL AT BEDTIME
Qty: 90 TABLET | Refills: 3 | OUTPATIENT
Start: 2025-05-12

## 2025-05-13 RX ORDER — ALPRAZOLAM 0.25 MG
0.25 TABLET ORAL DAILY PRN
Qty: 30 TABLET | Refills: 3 | Status: SHIPPED | OUTPATIENT
Start: 2025-05-13

## 2025-06-16 DIAGNOSIS — G47.00 INSOMNIA, UNSPECIFIED TYPE: ICD-10-CM

## 2025-06-16 RX ORDER — ESZOPICLONE 2 MG/1
2 TABLET, FILM COATED ORAL
Qty: 90 TABLET | Refills: 3 | OUTPATIENT
Start: 2025-06-16

## 2025-06-16 RX ORDER — ESZOPICLONE 2 MG/1
2 TABLET, FILM COATED ORAL AT BEDTIME
Qty: 90 TABLET | Refills: 3 | Status: SHIPPED | OUTPATIENT
Start: 2025-06-16

## 2025-06-16 NOTE — TELEPHONE ENCOUNTER
The refills have  for this because this medications prescriptions  after 5 months. Last prescription I have on file is from 10/2024. Please send a new prescription.    Thank you,    Lillian Rosales  Certified Pharmacy Technician II, [facility]  Ph: [phone]  Fx: [phone]

## 2025-06-17 ENCOUNTER — MYC MEDICAL ADVICE (OUTPATIENT)
Dept: FAMILY MEDICINE | Facility: CLINIC | Age: 60
End: 2025-06-17
Payer: COMMERCIAL

## 2025-09-04 LAB
GO4PGX COMMENTS: NORMAL
GO4PGX DISCLAIMER: NORMAL
GO4PGX LIMITATIONS: NORMAL
GO4PGX METHODOLOGY: NORMAL
LAB TESTS NARRATIVE: NORMAL

## (undated) DEVICE — ENDO SNARE EXACTO COLD 9MM LOOP 2.4MMX230CM 00711115